# Patient Record
Sex: FEMALE | Race: WHITE | Employment: PART TIME | URBAN - METROPOLITAN AREA
[De-identification: names, ages, dates, MRNs, and addresses within clinical notes are randomized per-mention and may not be internally consistent; named-entity substitution may affect disease eponyms.]

---

## 2017-03-23 ENCOUNTER — HOSPITAL ENCOUNTER (OUTPATIENT)
Dept: LAB | Age: 72
Discharge: HOME | End: 2017-03-23
Payer: MEDICARE

## 2017-03-23 ENCOUNTER — OFFICE VISIT (OUTPATIENT)
Dept: INTERNAL MEDICINE CLINIC | Age: 72
End: 2017-03-23

## 2017-03-23 ENCOUNTER — TELEPHONE (OUTPATIENT)
Dept: INTERNAL MEDICINE CLINIC | Age: 72
End: 2017-03-23

## 2017-03-23 VITALS
HEART RATE: 77 BPM | HEIGHT: 62 IN | TEMPERATURE: 97.7 F | DIASTOLIC BLOOD PRESSURE: 68 MMHG | RESPIRATION RATE: 20 BRPM | OXYGEN SATURATION: 97 % | WEIGHT: 154 LBS | SYSTOLIC BLOOD PRESSURE: 125 MMHG | BODY MASS INDEX: 28.34 KG/M2

## 2017-03-23 DIAGNOSIS — Z23 IMMUNIZATION DUE: ICD-10-CM

## 2017-03-23 DIAGNOSIS — E55.9 VITAMIN D DEFICIENCY: ICD-10-CM

## 2017-03-23 DIAGNOSIS — J45.20 RAD (REACTIVE AIRWAY DISEASE), MILD INTERMITTENT, UNCOMPLICATED: ICD-10-CM

## 2017-03-23 DIAGNOSIS — E07.9 THYROID DISORDER: ICD-10-CM

## 2017-03-23 DIAGNOSIS — Z11.59 NEED FOR HEPATITIS C SCREENING TEST: ICD-10-CM

## 2017-03-23 DIAGNOSIS — M25.551 PAIN OF RIGHT HIP JOINT: ICD-10-CM

## 2017-03-23 DIAGNOSIS — R23.8 SKIN PAPULES, GENERALIZED: ICD-10-CM

## 2017-03-23 DIAGNOSIS — I10 ESSENTIAL HYPERTENSION: Primary | ICD-10-CM

## 2017-03-23 PROCEDURE — 36415 COLL VENOUS BLD VENIPUNCTURE: CPT

## 2017-03-23 PROCEDURE — 82306 VITAMIN D 25 HYDROXY: CPT

## 2017-03-23 PROCEDURE — 80053 COMPREHEN METABOLIC PANEL: CPT

## 2017-03-23 PROCEDURE — 82043 UR ALBUMIN QUANTITATIVE: CPT

## 2017-03-23 PROCEDURE — 86803 HEPATITIS C AB TEST: CPT

## 2017-03-23 PROCEDURE — 84443 ASSAY THYROID STIM HORMONE: CPT

## 2017-03-23 RX ORDER — ALBUTEROL SULFATE 90 UG/1
2 AEROSOL, METERED RESPIRATORY (INHALATION)
Qty: 1 INHALER | Refills: 3 | Status: SHIPPED | OUTPATIENT
Start: 2017-03-23 | End: 2018-10-25 | Stop reason: SDUPTHER

## 2017-03-23 RX ORDER — LOSARTAN POTASSIUM 50 MG/1
50 TABLET ORAL DAILY
Qty: 90 TAB | Refills: 3 | Status: SHIPPED | COMMUNITY
Start: 2017-03-23 | End: 2018-03-04 | Stop reason: SDUPTHER

## 2017-03-23 RX ORDER — POLYETHYLENE GLYCOL 3350 17 G/17G
POWDER, FOR SOLUTION ORAL
Refills: 6 | COMMUNITY
Start: 2017-03-04 | End: 2021-06-10

## 2017-03-23 RX ORDER — LEVOTHYROXINE SODIUM 112 UG/1
112 TABLET ORAL
Qty: 90 TAB | Refills: 3 | Status: SHIPPED | COMMUNITY
Start: 2017-03-23 | End: 2017-03-27 | Stop reason: SDUPTHER

## 2017-03-23 NOTE — TELEPHONE ENCOUNTER
Pt left some information for the doctor to review. I placed it in the doctor's mailbox at the .      Thanks

## 2017-03-23 NOTE — PROGRESS NOTES
Milli San is a 73 y.o. female and presents with Osteoarthritis and Hip Pain  . Pt presents for follow up re: right hip, leg and ankle pain    She has transferred from [place] to live close to her 2 sons in [place]. She is not  and still works for [employer] in [place]. Right hip pain  Pt reports ongoing history >2 years of right hip pain but also involving right knee and ankle. She denies trauma or fall. She does have to carry heavy luggages in the airport when traveling. She was told she may be bursititis in her right hip but did not get injection. Mild relief with advil/nsaid product. No falls  No giving way of knees    Subjective:  Cardiovascular Review:  The patient has hypertension. Diet and Lifestyle: generally follows a low fat low cholesterol diet  Home BP Monitoring: is not measured at home. Pertinent ROS: taking medications as instructed, no medication side effects noted, no TIA's, no chest pain on exertion, no dyspnea on exertion, no swelling of ankles. Asthma Review:  The patient is being seen for follow up of no history of pneumonia or bronchitis and asthma, not currently in exacerbation. Asthma symptoms occur: infrequently. Wheezing when present is described as mild and easily relieved with rescue bronchodilator. Current limitations in activity from asthma: none. Number of days of school or work missed in the last month: 0. Frequency of use of quick-relief meds: rare. Regimen compliance: The patient reports adherence to this regimen. Patient denies and does not smoke cigarettes. Thyroid Review:  Patient is seen for followup of hypothyroidism. Thyroid ROS: denies fatigue, weight changes, heat/cold intolerance, bowel/skin changes or CVS symptoms. Subjective:   Milli San is a 73 y.o. female with hypertension.   Hypertension ROS: taking medications as instructed, no medication side effects noted, no TIA's, no chest pain on exertion, no dyspnea on exertion, no swelling of ankles. New concerns: sometimes has periods of lightheadedness    Thyroid Disease:  Elena Justice is a 73 y.o. female here for follow up of hypothyroidism. Lab Results   Component Value Date/Time    TSH 3.430 03/31/2016 11:41 AM     Residual symptoms denies fatigue, weight changes, heat/cold intolerance, bowel/skin changes or CVS symptoms. Thyroid medication has been unchanged since last medication check and labs. Review of Systems  Constitutional: negative for fevers, chills, anorexia and weight loss  Eyes:   negative for visual disturbance and irritation  ENT:   negative for tinnitus,sore throat,nasal congestion,ear pains. hoarseness  Respiratory:  negative for cough, hemoptysis, dyspnea,wheezing  CV:   negative for chest pain, palpitations, lower extremity edema  GI:   negative for nausea, vomiting, diarrhea, abdominal pain,melena  Endo:               negative for polyuria,polydipsia,polyphagia,heat intolerance  Genitourinary: negative for frequency, dysuria and hematuria  Integument:  negative for rash and pruritus  Hematologic:  negative for easy bruising and gum/nose bleeding  Musculoskel: negative for myalgias, arthralgias, back pain, muscle weakness, joint pain  Neurological:  negative for headaches, dizziness, vertigo, memory problems and gait   Behavl/Psych: negative for feelings of anxiety, depression, mood changes    Past Medical History:   Diagnosis Date    Asthma     Cataract     Hypertension     Joint pain     feet and hips     Precancerous lesion 2003    Colon    Thyroid disease      Past Surgical History:   Procedure Laterality Date    COLONOSCOPY N/A 6/20/2016    COLONOSCOPY performed by Georgia Hollins MD at [address] [facility] HX CATARACT REMOVAL      HX COLECTOMY      HX COLONOSCOPY      partial colon removed   colonscopy q 5 years     HX DILATION AND CURETTAGE      HX TONSIL AND ADENOIDECTOMY       Social History     Social History    Marital status:      Spouse name: N/A    Number of children: N/A    Years of education: N/A     Social History Main Topics    Smoking status: Former Smoker    Smokeless tobacco: Never Used    Alcohol use 3.0 oz/week     5 Glasses of wine per week    Drug use: No    Sexual activity: No     Other Topics Concern    None     Social History Narrative    Working part time for [employer]    manageble stress        Not     Children: 2 sons healthy    2 grandchildren healthy so far     Family History   Problem Relation Age of Onset    Cancer Mother      cervical cancer    Stroke Mother      hemorhagic    Diabetes Father     Hypertension Father      Current Outpatient Prescriptions   Medication Sig Dispense Refill    Cholecalciferol, Vitamin D3, 50,000 unit cap Take 1 Cap by mouth every seven (7) days. Indications: VITAMIN D DEFICIENCY 12 Cap 0    aspirin delayed-release 81 mg tablet Take  by mouth daily.  calcium-cholecalciferol, d3, 600-125 mg-unit tab Take  by mouth.  losartan (COZAAR) 50 mg tablet Take 1 Tab by mouth daily. 90 Tab 3    levothyroxine (SYNTHROID) 112 mcg tablet Take 1 Tab by mouth Daily (before breakfast). Indications: HYPOTHYROIDISM 90 Tab 3    polyethylene glycol (MIRALAX) 17 gram/dose powder TK 17 GRAMS DISSOLVED IN WATER ONCE A DAY  6    albuterol (PROVENTIL HFA, VENTOLIN HFA, PROAIR HFA) 90 mcg/actuation inhaler Take 2 Puffs by inhalation every four (4) hours as needed for Wheezing or Shortness of Breath. 1 Inhaler 3    fluticasone-salmeterol (ADVAIR) 100-50 mcg/dose diskus inhaler Take 1 Puff by inhalation two (2) times a day.  Indications: MAINTENANCE THERAPY FOR ASTHMA 1 Inhaler 1     No Known Allergies    Objective:  Visit Vitals    /68 (BP 1 Location: Left arm, BP Patient Position: Sitting)    Pulse 77    Temp 97.7 °F (36.5 °C) (Oral)    Resp 20    Ht 5' 2\" (1.575 m)    Wt 154 lb (69.9 kg)    SpO2 97%    BMI 28.17 kg/m2 Physical Exam:   General appearance - alert, well appearing, and in no distress  Mental status - alert, oriented to person, place, and time  EYE-CALVIN, EOMI, corneas normal, no foreign bodies, visual acuity normal both eyes, no periorbital cellulitis  ENT-ENT exam normal, no neck nodes or sinus tenderness  Nose - normal and patent, no erythema, discharge or polyps  Mouth - mucous membranes moist, pharynx normal without lesions  Neck - supple, no significant adenopathy   Chest - clear to auscultation, no wheezes, rales or rhonchi, symmetric air entry   Heart - normal rate, regular rhythm, normal S1, S2, no murmurs, rubs, clicks or gallops   Abdomen - soft, nontender, nondistended, no masses or organomegaly  Lymph- no adenopathy palpable  Ext-peripheral pulses normal, no pedal edema, no clubbing or cyanosis  Skin-Warm and dry. no hyperpigmentation, vitiligo, or suspicious lesions  Neuro -alert, oriented, normal speech, no focal findings or movement disorder noted  Neck-normal C-spine, no tenderness, full ROM without pain      Results for orders placed or performed in visit on 24/00/56   METABOLIC PANEL, COMPREHENSIVE   Result Value Ref Range    Glucose 99 65 - 99 mg/dL    BUN 11 8 - 27 mg/dL    Creatinine 0.63 0.57 - 1.00 mg/dL    GFR est non-AA 92 >59 mL/min/1.73    GFR est  >59 mL/min/1.73    BUN/Creatinine ratio 17 11 - 26    Sodium 139 134 - 144 mmol/L    Potassium 4.1 3.5 - 5.2 mmol/L    Chloride 102 97 - 108 mmol/L    CO2 26 18 - 29 mmol/L    Calcium 9.4 8.7 - 10.3 mg/dL    Protein, total 6.3 6.0 - 8.5 g/dL    Albumin 4.5 3.6 - 4.8 g/dL    GLOBULIN, TOTAL 1.8 1.5 - 4.5 g/dL    A-G Ratio 2.5 1.1 - 2.5    Bilirubin, total 1.0 0.0 - 1.2 mg/dL    Alk.  phosphatase 83 39 - 117 IU/L    AST (SGOT) 23 0 - 40 IU/L    ALT (SGPT) 19 0 - 32 IU/L   LIPID PANEL   Result Value Ref Range    Cholesterol, total 189 100 - 199 mg/dL    Triglyceride 125 0 - 149 mg/dL    HDL Cholesterol 50 >39 mg/dL    VLDL, calculated 25 5 - 40 mg/dL    LDL, calculated 114 (H) 0 - 99 mg/dL   TSH, 3RD GENERATION   Result Value Ref Range    TSH 3.430 0.450 - 4.500 uIU/mL   VITAMIN D, 25 HYDROXY   Result Value Ref Range    VITAMIN D, 25-HYDROXY 20.6 (L) 30.0 - 100.0 ng/mL   MICROALBUMIN:CREATININE RATIO, RANDOM URINE   Result Value Ref Range    Creatinine, urine 94.8 15.0 - 278.0 mg/dL    Microalbumin, urine 4.9 0.0 - 17.0 ug/mL    Microalb/Creat ratio (ug/mg creat.) 5.2 0.0 - 30.0 mg/g creat   CVD REPORT   Result Value Ref Range    INTERPRETATION Note      Prevention    Cardiovascular profile  Family hx  Exercising:  Enjoys [employer]   Blood pressure:  Health healthy diet:  Diabetes:  Cholesterol:  Renal function:      Cancer risk profile  Mammogram due in June 2015  Lung none  Colonoscopy dr. lAfie Mcbride  Skin nonhealing in 2 weeks seen dermatology  Gyn abnormal bleeding/discharge/abd pain/pressure      Thyroid sx no sx    Osteopenia prevention  Calcium 1000mg/day yes  Vitamin D 800iu/day yes  Bone density 2015    Mental health scale: 10/10  Depression  Anxiety  Sleep # of hours:  Energy Level:        Immunizations needs tdap  TDAP  Pneumonia vaccine  Flu vaccine  Shingles vaccine  HPV      Edith Spears was seen today for osteoarthritis and hip pain. Diagnoses and all orders for this visit:    Essential hypertension  Cont meds  Recheck of bp wnl  -     METABOLIC PANEL, COMPREHENSIVE  -     MICROALBUMIN, UR, RAND W/ MICROALBUMIN/CREA RATIO  -     REFERRAL TO OPHTHALMOLOGY  -     losartan (COZAAR) 50 mg tablet; Take 1 Tab by mouth daily. Thyroid disorder  Needs labs tor refill  Appears euthryoid  -     TSH 3RD GENERATION  -     levothyroxine (SYNTHROID) 112 mcg tablet; Take 1 Tab by mouth Daily (before breakfast). Indications: hypothyroidism    Pain of right hip joint  -     REFERRAL TO PHYSICAL THERAPY    Immunization due  -     pneumococcal 23-valent (PNEUMOVAX 23) 25 mcg/0.5 mL injection; 0.5 mL by IntraMUSCular route once for 1 dose.  Indications: PREVENTION OF STREPTOCOCCUS PNEUMONIAE INFECTION    Need for hepatitis C screening test  -     HEPATITIS C AB    Vitamin D deficiency  -     VITAMIN D, 25 HYDROXY    RAD (reactive airway disease), mild intermittent, uncomplicated  -     albuterol (PROVENTIL HFA, VENTOLIN HFA, PROAIR HFA) 90 mcg/actuation inhaler; Take 2 Puffs by inhalation every four (4) hours as needed for Wheezing or Shortness of Breath. Skin papules, generalized  -     REFERRAL TO DERMATOLOGY  Needs skin cancer screening      This note will not be viewable in MyChart.

## 2017-03-23 NOTE — MR AVS SNAPSHOT
Visit Information Date & Time Provider Department Dept. Phone Encounter #  
 3/23/2017  9:15 AM Berna Obrien MD [facility] [address] [id number] Upcoming Health Maintenance Date Due FOBT Q 1 YEAR AGE 50-75 10/8/1997 Pneumococcal 65+ Low/Medium Risk (2 of 2 - PPSV23) 10/31/2016 MEDICARE YEARLY EXAM 7/20/2017 BREAST CANCER SCRN MAMMOGRAM 6/16/2018 GLAUCOMA SCREENING Q2Y 3/23/2019 DTaP/Tdap/Td series (2 - Td) 3/31/2026 Allergies as of 3/23/2017  Review Complete On: 3/23/2017 By: Berna Obrien MD  
 No Known Allergies Current Immunizations  Reviewed on 3/23/2017 Name Date Influenza High Dose Vaccine PF 9/30/2016 Influenza Vaccine 10/31/2015 Pneumococcal Conjugate (PCV-13) 10/31/2015 Pneumococcal Polysaccharide (PPSV-23) 6/15/2010 Tdap 3/31/2016 11:31 AM  
 Zoster Vaccine, Live 6/15/2010 Reviewed by Jennifer Alejo LPN on 5/75/7961 at  9:18 AM  
 Reviewed by Jennifer Alejo LPN on 4/28/4146 at  9:18 AM  
 Reviewed by Jennifer Alejo LPN on 5/22/4500 at  9:18 AM  
You Were Diagnosed With   
  
 Codes Comments Essential hypertension    -  Primary ICD-10-CM: I10 
ICD-9-CM: 401.9 Thyroid disorder     ICD-10-CM: E07.9 ICD-9-CM: 246.9 Pain of right hip joint     ICD-10-CM: M25.551 ICD-9-CM: 719.45 Immunization due     ICD-10-CM: Z23 ICD-9-CM: V05.9 Need for hepatitis C screening test     ICD-10-CM: Z11.59 
ICD-9-CM: V73.89 Vitamin D deficiency     ICD-10-CM: E55.9 ICD-9-CM: 268.9   
 RAD (reactive airway disease), mild intermittent, uncomplicated     WUT-88-ML: J45.20 ICD-9-CM: 493.90 Skin papules, generalized     ICD-10-CM: R23.8 ICD-9-CM: 709.8 Vitals BP Pulse Temp Resp Height(growth percentile) Weight(growth percentile) 125/68 (BP 1 Location: Left arm, BP Patient Position: Sitting) 77 97.7 °F (36.5 °C) (Oral) 20 5' 2\" (1.575 m) 154 lb (69.9 kg) SpO2 BMI OB Status Smoking Status 97% 28.17 kg/m2 Postmenopausal Former Smoker Vitals History BMI and BSA Data Body Mass Index Body Surface Area  
 28.17 kg/m 2 1.75 m 2 Preferred Pharmacy Pharmacy Name Phone [address] [phone] Your Updated Medication List  
  
   
This list is accurate as of: 3/23/17 10:00 AM.  Always use your most recent med list.  
  
  
  
  
 albuterol 90 mcg/actuation inhaler Commonly known as:  PROVENTIL HFA, VENTOLIN HFA, PROAIR HFA Take 2 Puffs by inhalation every four (4) hours as needed for Wheezing or Shortness of Breath. aspirin delayed-release 81 mg tablet Take  by mouth daily. calcium-cholecalciferol (d3) 600-125 mg-unit Tab Take  by mouth. Cholecalciferol (Vitamin D3) 50,000 unit Cap Take 1 Cap by mouth every seven (7) days. Indications: VITAMIN D DEFICIENCY  
  
 fluticasone-salmeterol 100-50 mcg/dose diskus inhaler Commonly known as:  ADVAIR Take 1 Puff by inhalation two (2) times a day. Indications: MAINTENANCE THERAPY FOR ASTHMA  
  
 levothyroxine 112 mcg tablet Commonly known as:  SYNTHROID Take 1 Tab by mouth Daily (before breakfast). Indications: hypothyroidism  
  
 losartan 50 mg tablet Commonly known as:  COZAAR Take 1 Tab by mouth daily. pneumococcal 23-valent 25 mcg/0.5 mL injection Commonly known as:  PNEUMOVAX 23  
0.5 mL by IntraMUSCular route once for 1 dose. Indications: PREVENTION OF STREPTOCOCCUS PNEUMONIAE INFECTION  
  
 polyethylene glycol 17 gram/dose powder Commonly known as:  Chelsea Coe TK 17 GRAMS DISSOLVED IN WATER ONCE A DAY Prescriptions Printed Refills  
 pneumococcal 23-valent (PNEUMOVAX 23) 25 mcg/0.5 mL injection 0 Si.5 mL by IntraMUSCular route once for 1 dose. Indications: PREVENTION OF STREPTOCOCCUS PNEUMONIAE INFECTION Class: Print Route: IntraMUSCular albuterol (PROVENTIL HFA, VENTOLIN HFA, PROAIR HFA) 90 mcg/actuation inhaler 3 Sig: Take 2 Puffs by inhalation every four (4) hours as needed for Wheezing or Shortness of Breath. Class: Print Route: Inhalation Prescriptions Sent to Mail Order Refills  
 losartan (COZAAR) 50 mg tablet 3 Sig: Take 1 Tab by mouth daily. Class: Mail Order Pharmacy: [address], [address] [place] [address] Ph #: [phone] Route: Oral  
 levothyroxine (SYNTHROID) 112 mcg tablet 3 Sig: Take 1 Tab by mouth Daily (before breakfast). Indications: hypothyroidism Class: Mail Order Pharmacy: [address], [address] [place] [address] Ph #: [phone] Route: Oral  
  
We Performed the Following HEPATITIS C AB [71750 CPT(R)] METABOLIC PANEL, COMPREHENSIVE [96313 CPT(R)] MICROALBUMIN, UR, RAND W/ MICROALBUMIN/CREA RATIO [id number] CPT(R)] REFERRAL TO DERMATOLOGY [REF19 Custom] Comments:  
 Skin cancer screen pt would like to see MD  
 REFERRAL TO OPHTHALMOLOGY [REF57 Custom] Comments:  
 Please evaluate patient for htn hx and please evaluate macula. REFERRAL TO PHYSICAL THERAPY [WEH99 Custom] Comments:  
 Right hip, knee and foot pain, please evaluate kinetic line and gait TSH 3RD GENERATION [68263 CPT(R)] VITAMIN D, 25 HYDROXY [id number] CPT(R)] Referral Information Referral ID Referred By Referred To  
  
 [id number] Tigist Durant MD   
   [address] [facility] [address], [address] Phone: [phone] Fax: [phone] Visits Status Start Date End Date 1 New Request 3/23/17 3/23/18 If your referral has a status of pending review or denied, additional information will be sent to support the outcome of this decision. Referral ID Referred By Referred To [id number] [facility], [address] [facility], [address] [facility] Visits Status Start Date End Date 1 New Request 3/23/17 3/23/18 If your referral has a status of pending review or denied, additional information will be sent to support the outcome of this decision. Referral ID Referred By Referred To  
 [id number] Ele Oneal MD  
   [address] [facility], [address] [place] [address] Phone: [phone] Fax: [phone] Visits Status Start Date End Date 1 New Request 3/23/17 3/23/18 If your referral has a status of pending review or denied, additional information will be sent to support the outcome of this decision. Introducing [facility]! Dear Cesario Nuñez: Thank you for requesting a [employer] account. Our records indicate that you already have an active [employer] account. You can access your account anytime at [email or web address]. [email or web address] Did you know that you can access your hospital and ER discharge instructions at any time in [employer]? You can also review all of your test results from your hospital stay or ER visit. Additional Information If you have questions, please visit the Frequently Asked Questions section of the [employer] website at [email or web address] Remember, [employer] is NOT to be used for urgent needs. For medical emergencies, dial 911. Now available from your iPhone and Android! Please provide this summary of care documentation to your next provider. Your primary care clinician is listed as Triny Pace. If you have any questions after today's visit, please call [phone].

## 2017-03-24 LAB
25(OH)D3+25(OH)D2 SERPL-MCNC: 34.1 NG/ML (ref 30–100)
ALBUMIN SERPL-MCNC: 4.7 G/DL (ref 3.6–4.8)
ALBUMIN/CREAT UR: 5 MG/G CREAT (ref 0–30)
ALBUMIN/GLOB SERPL: 2.6 {RATIO} (ref 1.2–2.2)
ALP SERPL-CCNC: 73 IU/L (ref 39–117)
ALT SERPL-CCNC: 16 IU/L (ref 0–32)
AST SERPL-CCNC: 19 IU/L (ref 0–40)
BILIRUB SERPL-MCNC: 0.7 MG/DL (ref 0–1.2)
BUN SERPL-MCNC: 10 MG/DL (ref 8–27)
BUN/CREAT SERPL: 19 (ref 11–26)
CALCIUM SERPL-MCNC: 9.6 MG/DL (ref 8.7–10.3)
CHLORIDE SERPL-SCNC: 99 MMOL/L (ref 96–106)
CO2 SERPL-SCNC: 26 MMOL/L (ref 18–29)
CREAT SERPL-MCNC: 0.52 MG/DL (ref 0.57–1)
CREAT UR-MCNC: 66.6 MG/DL
GLOBULIN SER CALC-MCNC: 1.8 G/DL (ref 1.5–4.5)
GLUCOSE SERPL-MCNC: 96 MG/DL (ref 65–99)
HCV AB S/CO SERPL IA: <0.1 S/CO RATIO (ref 0–0.9)
MICROALBUMIN UR-MCNC: 3.3 UG/ML
POTASSIUM SERPL-SCNC: 4.8 MMOL/L (ref 3.5–5.2)
PROT SERPL-MCNC: 6.5 G/DL (ref 6–8.5)
SODIUM SERPL-SCNC: 141 MMOL/L (ref 134–144)
TSH SERPL DL<=0.005 MIU/L-ACNC: 5.45 UIU/ML (ref 0.45–4.5)

## 2017-03-27 DIAGNOSIS — E07.9 THYROID DISORDER: ICD-10-CM

## 2017-03-27 RX ORDER — LEVOTHYROXINE SODIUM 125 UG/1
125 TABLET ORAL
Qty: 30 TAB | Refills: 3 | Status: SHIPPED | OUTPATIENT
Start: 2017-03-27 | End: 2017-08-01 | Stop reason: SDUPTHER

## 2017-03-27 NOTE — PROGRESS NOTES
Please call pt and let her know I increased her thryoid medication. She can  the dose 125 mcg at her local [facility]. She needs to recheck in 3 months while she is on her medication.  Thanks,lauren

## 2017-05-24 ENCOUNTER — HOSPITAL ENCOUNTER (OUTPATIENT)
Dept: PHYSICAL THERAPY | Age: 72
Discharge: HOME | End: 2017-05-24
Payer: MEDICARE

## 2017-05-24 PROCEDURE — G8978 MOBILITY CURRENT STATUS: HCPCS | Performed by: PHYSICAL THERAPIST

## 2017-05-24 PROCEDURE — G8979 MOBILITY GOAL STATUS: HCPCS | Performed by: PHYSICAL THERAPIST

## 2017-05-24 PROCEDURE — 97112 NEUROMUSCULAR REEDUCATION: CPT | Performed by: PHYSICAL THERAPIST

## 2017-05-24 PROCEDURE — 97162 PT EVAL MOD COMPLEX 30 MIN: CPT | Performed by: PHYSICAL THERAPIST

## 2017-05-24 NOTE — PROGRESS NOTES
PT INITIAL EVALUATION NOTE - [facility] 2-15    Patient Name: Zita Garnett  Date:2017  : 1945  [x]  Patient  Verified  Payor: Aidee Lu / Plan: [place] MEDICARE PART A & B / Product Type: Medicare /    In time:10:35 AM  Out time:11:30 AM  Total Treatment Time (min): 55  Total Timed Codes (min): 20  1:1 Treatment Time ( [address] only): 55   Visit #: 1     Treatment Area: Right hip pain [M25.551]    SUBJECTIVE  Pain Level (0-10 scale): 0/10  At worst: 4/10, pain is increased with walking (hip), standing in one place (feet), carrying heavy objects  At best: 0/10, pain is decreased with Aleve, hot water, rest  Any medication changes, allergies to medications, adverse drug reactions, diagnosis change, or new procedure performed?: [] No    [x] Yes (see summary sheet for update)  Subjective:    Pt c/o pain in her R hip and feet. Foot pain has been present years. Hip pain began 4 years ago. Pain is intermittent. Patient reports she has pain at the top of her feet and has bunions. Pt has not had x-rays or MRIs. Pt reports she takes Aleve if she has trouble falling asleep at night. 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, especially up and down the steps  Living Situation: Pt has stairs in her home, \"but I don't need to go up and down stairs often\" \"I usually hold on to the railing\"  Pt Goals:  To be pain free  Barriers: Chronic nature of condition  Motivation: Good  Substance use: None  FABQ Score: 60%  Cognition: A & O x 3 OBJECTIVE/EXAMINATION  Posture:  L shoulder higher, pes planus, R bunion > L bunion  Palpation: TTP at B piriformis/ glut med    Lower Extremity AROM:        R  L  Hip IR   35  45   Hip ER   35  55    Lumbar AROM WFL and pain free    LOWER QUARTER   MUSCLE STRENGTH  KEY       R  L  0 - No Contraction  L1, L2 Psoas  4  5    1 - Trace   L3 Quads  5  5    2 - Poor   L4 Tib Ant  5  5    3 - Fair    L5 EHL  5  4    4 - Good   S1 FHL  5  5    5 - Normal   S2 Hams  5  5            MMT: See above  Hip abduction R 4/5 L 4+/5  Hip IR 4/5 B  Hip ER 4+/5 B    Neurological: Sensation WNL  Special Tests:         Nilam: R L +   H.S. SLR: Negative             NAEEM: R+ L -   Single Leg Stance: R 5 s L 10 s, increased postural sway     20 min Neuromuscular Re-education:  [x]  See flow sheet :   Rationale: increase ROM, increase strength and improve coordination  to improve the patients ability to sit, stand, transfer, ambulate, lift, carry, reach, complete ADLs      With   [] TE   [] TA   [x] neuro   [] other: Patient Education: [x] Review HEP    [] Progressed/Changed HEP based on:   [x] positioning   [x] body mechanics   [] transfers   [x] heat/ice application    [] other:      Other Objective/Functional Measures:- HAdDT following intervention    Pain Level (0-10 scale) post treatment: 0    ASSESSMENT/Changes in Function:     [x]  See Plan of [address] Sotelo, PT 5/24/2017  10:34 AM

## 2017-05-24 NOTE — PROGRESS NOTES
[address] [place] [address]  Phone: [phone]  Fax: [phone]    Plan of Care/Statement of Necessity for Physical Therapy Services  2-15    Patient name: Tee Cadena  : 1945  Provider#: [id number]  Referral source: [facility] *      Medical/Treatment Diagnosis: Right hip pain [M25.551]     Prior Hospitalization: see medical history     Comorbidities: arthritis, HTN, asthma  Prior Level of Function: Pt enjoys walking 3-3.5 miles and works 20 hours per week  Medications: Verified on Patient Summary List  Start of Care: 17      Onset Date: 4 years ago   The Plan of Care and following information is based on the information from the initial evaluation. Assessment/ key information: The patient presents with L AIC, R BC patterning per [facility] treatment approach contributing to R hip pain. The patient's condition is complicated by chronic nature of condition and presence of B foot pain possibly due to foot shape (pes planus/ bunions B). Evaluation Complexity History MEDIUM  Complexity : 1-2 comorbidities / personal factors will impact the outcome/ POC ; Examination HIGH Complexity : 4+ Standardized tests and measures addressing body structure, function, activity limitation and / or participation in recreation  ;Presentation MEDIUM Complexity : Evolving with changing characteristics  ; Clinical Decision Making MEDIUM Complexity : FOTO score of 26-74  Overall Complexity Rating: MEDIUM    Problem List: pain affecting function, decrease ROM, decrease strength, impaired gait/ balance, decrease ADL/ functional abilitiies, decrease activity tolerance, decrease flexibility/ joint mobility and decrease transfer abilities   Treatment Plan may include any combination of the following: Therapeutic exercise, Neuromuscular re-education, Physical agent/modality, Gait/balance training, Manual therapy, Patient education and Functional mobility training  Patient / Family readiness to learn indicated by: asking questions, trying to perform skills and interest  Persons(s) to be included in education: patient (P)  Barriers to Learning/Limitations: None  Patient Goal (s): decrease pain, keep up with my walking routine  Patient Self Reported Health Status: good  Rehabilitation Potential: good    Short Term Goals: To be accomplished in 4 weeks:  1) Patient will demonstrate independence with HEP  2) Patient will demonstrate Negative Hruska Adduction Drop Test   3) Patient will demonstrate greater than Grade II on Hruska Adduction Lift Test  Long Term Goals: To be accomplished in 12 weeks:  1) The patient will resume walking routine without an increase in pain  2) The patient will report ability to carry computer bag up the stairs without an increase in pain  3) The patient will be independent with finalized HEP  Frequency / Duration: Patient to be seen 2 times per week for 12 weeks. Patient/ Caregiver education and instruction: self care, activity modification and exercises    [x]  Plan of care has been reviewed with PTA    G-Codes (GP)  Mobility   Current  CK= 40-59%   Goal  CJ= 20-39%    The severity rating is based on clinical judgment and the FOTO Score score. Certification Period: 5/24/17-8/24/17  Yoko Carrillo, PT 5/24/2017 12:49 PM    ________________________________________________________________________    I certify that the above Therapy Services are being furnished while the patient is under my care. I agree with the treatment plan and certify that this therapy is necessary.     [de-identified] Signature:____________________  Date:____________Time: _________

## 2017-06-01 ENCOUNTER — HOSPITAL ENCOUNTER (OUTPATIENT)
Dept: PHYSICAL THERAPY | Age: 72
Discharge: HOME | End: 2017-06-01
Payer: MEDICARE

## 2017-06-01 PROCEDURE — 97112 NEUROMUSCULAR REEDUCATION: CPT | Performed by: PHYSICAL THERAPIST

## 2017-06-01 NOTE — PROGRESS NOTES
PT DAILY TREATMENT NOTE - [facility] 2-15    Patient Name: Valdez Alfaro  Date:2017  : 1945  [x]  Patient  Verified  [place]or: [place] MED[place]ARE / Plan: [place] MEDICARE PART A & B / Product Type: Medicare /    In time:8:35 AM  Out time:9:05 AM  Total Treatment Time (min): 30  Total Timed Codes (min): 30  1:1 Treatment Time ([facility] only): 30   Visit #: 2     Treatment Area: Right hip pain [M25.551]    SUBJECTIVE  Pain Level (0-10 scale): 0  Any medication changes, allergies to medications, adverse drug reactions, diagnosis change, or new procedure performed?: [x] No    [] Yes (see summary sheet for update)  Subjective functional status/changes:   [] No changes reported  Pt reports she only feels the pain when she hauls her groceries up a flight of stairs    OBJECTIVE    30 min Neuromuscular Re-education:  [x]  See flow sheet :   Rationale: improve coordination, improve balance and increase proprioception  to improve the patients ability to sit, stand, transfer, ambulate, lift, carry, reach, complete ADLs        With   [] TE   [] TA   [x] neuro   [] other: Patient Education: [x] Review HEP    [] Progressed/Changed HEP based on:   [x] positioning   [x] body mechanics   [] transfers   [x] heat/ice application    [] other:      Other Objective/Functional Measures:   HAdDT: R- L +, Negative following reposition    Pain Level (0-10 scale) post treatment: 0    ASSESSMENT/Changes in Function:     Patient will continue to benefit from skilled PT services to modify and progress therapeutic interventions, address functional mobility deficits, address ROM deficits, address strength deficits, analyze and address soft tissue restrictions, analyze and cue movement patterns, analyze and modify body mechanics/ergonomics and assess and modify postural abnormalities to attain remaining goals.      []  See Plan of Care  []  See progress note/recertification  []  See Discharge Summary         Progress towards goals / Updated goals:  Patient continues to require verbal cues to complete exercises with correct form and postural awareness. Patient was able to advance several exercises this visit and is progressing well towards goals.     PLAN  [x]  Upgrade activities as tolerated     [x]  Continue plan of care  [x]  Update interventions per flow sheet       []  Discharge due to:_  []  Other:_      Fransisca Sibley, PT 6/1/2017  8:35 AM

## 2017-06-08 ENCOUNTER — HOSPITAL ENCOUNTER (OUTPATIENT)
Dept: PHYSICAL THERAPY | Age: 72
Discharge: HOME | End: 2017-06-08
Payer: MEDICARE

## 2017-06-08 PROCEDURE — 97112 NEUROMUSCULAR REEDUCATION: CPT | Performed by: PHYSICAL THERAPIST

## 2017-06-08 NOTE — PROGRESS NOTES
PT DAILY TREATMENT NOTE - [facility] 2-15    Patient Name: Eddie Joseph  Date:2017  : 1945  [x]  Patient  Verified  Payor: [place] MEDICARE / Plan: [place] MEDICARE PART A & B / Product Type: Medicare /    In time: 10:00 AM  Out time: 10:30 AM  Total Treatment Time (min): 30  Total Timed Codes (min): 30  1:1 Treatment Time ( only): 25  Visit #: 3    Treatment Area: Right hip pain [M25.551]    SUBJECTIVE  Pain Level (0-10 scale): 0  Any medication changes, allergies to medications, adverse drug reactions, diagnosis change, or new procedure performed?: [x] No    [] Yes (see summary sheet for update)  Subjective functional status/changes:   [] No changes reported  Pt reports she is noticing an improvement in stair negotiation    OBJECTIVE    30 min Neuromuscular Re-education:  [x]  See flow sheet :   Rationale: improve coordination, improve balance and increase proprioception  to improve the patients ability to sit, stand, transfer, ambulate, lift, carry, reach, complete ADLs        With   [] TE   [] TA   [x] neuro   [] other: Patient Education: [x] Review HEP    [] Progressed/Changed HEP based on:   [x] positioning   [x] body mechanics   [] transfers   [x] heat/ice application    [] other:      Other Objective/Functional Measures:   HAdDT: - B    R L1 L L2    Pain Level (0-10 scale) post treatment: 0    ASSESSMENT/Changes in Function:     Patient will continue to benefit from skilled PT services to modify and progress therapeutic interventions, address functional mobility deficits, address ROM deficits, address strength deficits, analyze and address soft tissue restrictions, analyze and cue movement patterns, analyze and modify body mechanics/ergonomics and assess and modify postural abnormalities to attain remaining goals.      []  See Plan of Care  []  See progress note/recertification  []  See Discharge Summary         Progress towards goals / Updated goals:  Patient continues to require verbal cues to complete exercises with correct form and postural awareness. Patient was able to advance several exercises this visit and is progressing well towards goals.     PLAN  [x]  Upgrade activities as tolerated     [x]  Continue plan of care  [x]  Update interventions per flow sheet       []  Discharge due to:_  []  Other:_      Ethan Oliver, PT 6/8/2017  8:35 AM

## 2017-06-15 ENCOUNTER — APPOINTMENT (OUTPATIENT)
Dept: PHYSICAL THERAPY | Age: 72
End: 2017-06-15
Payer: MEDICARE

## 2017-06-22 ENCOUNTER — HOSPITAL ENCOUNTER (OUTPATIENT)
Dept: PHYSICAL THERAPY | Age: 72
Discharge: HOME | End: 2017-06-22
Payer: MEDICARE

## 2017-06-22 PROCEDURE — 97110 THERAPEUTIC EXERCISES: CPT | Performed by: PHYSICAL THERAPIST

## 2017-06-22 PROCEDURE — 97112 NEUROMUSCULAR REEDUCATION: CPT | Performed by: PHYSICAL THERAPIST

## 2017-06-22 NOTE — PROGRESS NOTES
PT DAILY TREATMENT NOTE - [facility] 2-15    Patient Name: Cecilio Rosas  Date:2017  : 1945  [x]  Patient  Verified  Payor: Brad Madsen / Plan: [place] MEDICARE PART A & B / Product Type: Medicare /    In time: 10:00 AM  Out time: 10:50 AM  Total Treatment Time (min): 50  Total Timed Codes (min): 50  1:1 Treatment Time ( [address] only): 50  Visit #: 4    Treatment Area: Right hip pain [M25.551]    SUBJECTIVE  Pain Level (0-10 scale): 0  Any medication changes, allergies to medications, adverse drug reactions, diagnosis change, or new procedure performed?: [x] No    [] Yes (see summary sheet for update)  Subjective functional status/changes:   [] No changes reported  Pt reports her hip is not hurting but her knees have been hurting more today  Pt reports compliance with HEP    OBJECTIVE    40 min Neuromuscular Re-education:  [x]  See flow sheet :   Rationale: improve coordination, improve balance and increase proprioception  to improve the patients ability to sit, stand, transfer, ambulate, lift, carry, reach, complete ADLs    10 min Therapeutic Exercise:  [x]  See flow sheet :   Rationale: increase ROM and increase strength  to improve the patients ability to sit, stand, transfer, ambulate, lift, carry, reach, complete ADLs           With   [x] TE   [] TA   [x] neuro   [] other: Patient Education: [x] Review HEP    [] Progressed/Changed HEP based on:   [x] positioning   [x] body mechanics   [] transfers   [x] heat/ice application    [] other:      Other Objective/Functional Measures:   HAdDT: - B    R L2 L L2    Pain Level (0-10 scale) post treatment: 0    ASSESSMENT/Changes in Function:     Patient will continue to benefit from skilled PT services to modify and progress therapeutic interventions, address functional mobility deficits, address ROM deficits, address strength deficits, analyze and address soft tissue restrictions, analyze and cue movement patterns, analyze and modify body mechanics/ergonomics and assess and modify postural abnormalities to attain remaining goals. []  See Plan of Care  []  See progress note/recertification  []  See Discharge Summary         Progress towards goals / Updated goals:  Patient continues to require verbal cues to complete exercises with correct form and postural awareness. Patient was able to advance several exercises this visit and is progressing well towards goals.     PLAN  [x]  Upgrade activities as tolerated     [x]  Continue plan of care  [x]  Update interventions per flow sheet       []  Discharge due to:_  []  Other:_      Merary Mcbride, PT 6/22/2017  8:35 AM

## 2017-07-06 ENCOUNTER — HOSPITAL ENCOUNTER (OUTPATIENT)
Dept: PHYSICAL THERAPY | Age: 72
Discharge: HOME | End: 2017-07-06
Payer: MEDICARE

## 2017-07-06 PROCEDURE — G8979 MOBILITY GOAL STATUS: HCPCS | Performed by: PHYSICAL THERAPIST

## 2017-07-06 PROCEDURE — 97112 NEUROMUSCULAR REEDUCATION: CPT | Performed by: PHYSICAL THERAPIST

## 2017-07-06 PROCEDURE — G8978 MOBILITY CURRENT STATUS: HCPCS | Performed by: PHYSICAL THERAPIST

## 2017-07-06 NOTE — PROGRESS NOTES
PT DAILY TREATMENT NOTE - [facility] 2-15    Patient Name: Viviana Ruff  Date:2017  : 1945  [x]  Patient  Verified  Payor: [place] MEDICARE / Plan: [place] MEDICARE PART A & B / Product Type: Medicare /    In time: 10:15 AM  Out time: 10:55 AM  Total Treatment Time (min): 40  Total Timed Codes (min): 40  1:1 Treatment Time ( only): 40  Visit #: 5    Treatment Area: Right hip pain [M25.551]    SUBJECTIVE  Pain Level (0-10 scale): 0  Any medication changes, allergies to medications, adverse drug reactions, diagnosis change, or new procedure performed?: [x] No    [] Yes (see summary sheet for update)  Subjective functional status/changes:   [] No changes reported  Pt reports her left hip has been sore. Pt is walking up to 3.5 miles per day    OBJECTIVE    45 min Neuromuscular Re-education:  [x]  See flow sheet :   Rationale: improve coordination, improve balance and increase proprioception  to improve the patients ability to sit, stand, transfer, ambulate, lift, carry, reach, complete ADLs        With   [x] TE   [] TA   [x] neuro   [] other: Patient Education: [x] Review HEP    [] Progressed/Changed HEP based on:   [x] positioning   [x] body mechanics   [] transfers   [x] heat/ice application    [] other:      Other Objective/Functional Measures:   HAdDT: - B    R L3 L L3    Pain with L hip IR PROM , this decreased following exercise    Pain Level (0-10 scale) post treatment: 0    ASSESSMENT/Changes in Function:     Patient will continue to benefit from skilled PT services to modify and progress therapeutic interventions, address functional mobility deficits, address ROM deficits, address strength deficits, analyze and address soft tissue restrictions, analyze and cue movement patterns, analyze and modify body mechanics/ergonomics and assess and modify postural abnormalities to attain remaining goals.      []  See Plan of Care  [x]  See progress note/recertification  []  See Discharge Summary         Progress towards goals / Updated goals:  Patient continues to require verbal cues to complete exercises with correct form and postural awareness. Patient was able to advance several exercises this visit and is progressing well towards goals.     PLAN  [x]  Upgrade activities as tolerated     [x]  Continue plan of care  [x]  Update interventions per flow sheet       []  Discharge due to:_  []  Other:_      Aleja Hackett, PT 7/6/2017  10:10 AM

## 2017-07-06 NOTE — PROGRESS NOTES
[facility]  [address] [facility] [place] [address]  Phone: [phone]      Fax:  [phone]    Progress Note    Name: Erica oMrillo   : 1945   MD: Jennie Ba *       Treatment Diagnosis: Right hip pain [M25.551]  Start of Care: 17    Visits from Start of Care: 5  Missed Visits: 0    Summary of Care: Therapeutic Exercise,   Neuromuscular Re-education,Patient Education, Home Exercise Program     Assessment / Recommendations: The patient has completed 5 PT visits and has made significant improvements in R hip ROM and activity tolerance. She has resumed her walking routine without an increase in pain; however, she has been limited throughout this plan of care by L hip and B knee/ foot pain. The patient has met all short term goals and is progressing well towards long term goals. Short Term Goals: To be accomplished in 4 weeks:  1) Patient will demonstrate independence with HEP  Status at last note/certification: not met  Status at progress note: met  2) Patient will demonstrate Negative Hruska Adduction Drop Test   Status at last note/certification: not met  Status at progress note: met  3) Patient will demonstrate greater than Grade II on Hruska Adduction Lift Test  Status at last note/certification: not met  Status at progress note: met  Long Term Goals:  To be accomplished in 12 weeks:  1) The patient will resume walking routine without an increase in pain  Status at last note/certification: not met  Status at progress note: not met  2) The patient will report ability to carry computer bag up the stairs without an increase in pain  Status at last note/certification: not met  Status at progress note: not met  3) The patient will be independent with finalized HEP  Status at last note/certification: not met  Status at progress note: not met      G-Codes (GP)  Mobility  [id number] Current  CK= 40-59%   Goal  CJ= 20-39%    The severity rating is based on the FOTO Score score. Evette Palmer, PT 7/6/2017 11:37 AM    ________________________________________________________________________  NOTE TO PHYSICIAN:  Please complete the following and fax to: [facility]: Fax: [phone]. Katerina Meza Retain this original for your records. If you are unable to process this request in 24 hours, please contact our office.        ____ I have read the above report and request that my patient continue therapy with the following changes/special instructions:  ____ I have read the above report and request that my patient be discharged from therapy    Physician's Signature:_________________ Date:___________Time:__________

## 2017-07-07 ENCOUNTER — HOSPITAL ENCOUNTER (OUTPATIENT)
Dept: MAMMOGRAPHY | Age: 72
Discharge: HOME | End: 2017-07-07
Attending: INTERNAL MEDICINE
Payer: MEDICARE

## 2017-07-07 DIAGNOSIS — Z12.31 VISIT FOR SCREENING MAMMOGRAM: ICD-10-CM

## 2017-07-07 PROCEDURE — 77063 BREAST TOMOSYNTHESIS BI: CPT

## 2017-07-24 ENCOUNTER — APPOINTMENT (OUTPATIENT)
Dept: PHYSICAL THERAPY | Age: 72
End: 2017-07-24
Payer: MEDICARE

## 2017-07-24 ENCOUNTER — HOSPITAL ENCOUNTER (OUTPATIENT)
Dept: PHYSICAL THERAPY | Age: 72
Discharge: HOME | End: 2017-07-24
Payer: MEDICARE

## 2017-07-24 PROCEDURE — G8988 SELF CARE GOAL STATUS: HCPCS | Performed by: PHYSICAL THERAPIST

## 2017-07-24 PROCEDURE — G8989 SELF CARE D/C STATUS: HCPCS | Performed by: PHYSICAL THERAPIST

## 2017-07-24 PROCEDURE — 97112 NEUROMUSCULAR REEDUCATION: CPT | Performed by: PHYSICAL THERAPIST

## 2017-07-24 NOTE — PROGRESS NOTES
PT DAILY TREATMENT NOTE - [facility] 2-15    Patient Name: Fifi Wahl  Date:2017  : 1945  [x]  Patient  Verified  Payor: Page Curtist / Plan: [place] MEDICARE PART A & B / Product Type: Medicare /    In time: 4:35 PM Out time: 5:20 PM  Total Treatment Time (min): 45  Total Timed Codes (min): 45  1:1 Treatment Time ( [address] only): 39  Visit #: 6    Treatment Area: Right hip pain [M25.551]    SUBJECTIVE  Pain Level (0-10 scale): 0  Any medication changes, allergies to medications, adverse drug reactions, diagnosis change, or new procedure performed?: [x] No    [] Yes (see summary sheet for update)  Subjective functional status/changes:   [] No changes reported  Pt reports if she doesn't do her exercises right before she goes for a walk she feels better  Pt reports she has been feeling great and is ready for discharge at this time    OBJECTIVE    45 min Neuromuscular Re-education:  [x]  See flow sheet :   Rationale: improve coordination, improve balance and increase proprioception  to improve the patients ability to sit, stand, transfer, ambulate, lift, carry, reach, complete ADLs        With   [x] TE   [] TA   [x] neuro   [] other: Patient Education: [x] Review HEP    [] Progressed/Changed HEP based on:   [x] positioning   [x] body mechanics   [] transfers   [x] heat/ice application    [] other:      Other Objective/Functional Measures:   HAdDT: - B    R L3 L L3    Pain Level (0-10 scale) post treatment: 0    ASSESSMENT/Changes in Function:     Patient will continue to benefit from skilled PT services to modify and progress therapeutic interventions, address functional mobility deficits, address ROM deficits, address strength deficits, analyze and address soft tissue restrictions, analyze and cue movement patterns, analyze and modify body mechanics/ergonomics and assess and modify postural abnormalities to attain remaining goals.      []  See Plan of Care  []  See progress note/recertification  [x]  See Discharge Summary     PLAN  []  Upgrade activities as tolerated     []  Continue plan of care  []  Update interventions per flow sheet       [x]  Discharge due to:_  []  Other:_      Marc Loja PT 7/24/2017  10:10 AM

## 2017-07-24 NOTE — ANCILLARY DISCHARGE INSTRUCTIONS
[address]  Phone: [phone]  Fax: [phone]    Discharge Summary  2-15    Patient name: Savita Alberto  : 1945  Provider#: [id number]  Referral source: [facility] *      Medical/Treatment Diagnosis: Right hip pain [M25.551]     Prior Hospitalization: see medical history     Comorbidities: See Plan of Care  Prior Level of Function:See Plan of Care  Medications: Verified on Patient Summary List    Start of Care: 17      Onset Date:4 years prior to start of care   Visits from Start of Care: 6     Missed Visits: 0  Reporting Period : 17 to 17      ASSESSMENT/SUMMARY OF CARE:  The patient has completed 6 physical therapy visits and has met all short and long term goals. The patient scored a 82% on the FOTO hip survey, a significant improvement from initial evaluation score of 55%. The patient has maximized therapeutic benefit at this time and is ready for discharge to independent [facility].     Short Term Goals: To be accomplished in 4 weeks:  1) Patient will demonstrate independence with HEP  Status at last note/certification: met  Status at discharge: met  2) Patient will demonstrate Negative Hruska Adduction Drop Test   Status at last note/certification: met  Status at discharge:  met  3) Patient will demonstrate greater than Grade II on Hruska Adduction Lift Test  Status at last note/certification: met  Status at discharge:  met  [address] [facility] be accomplished in 12 weeks:  1) The patient will resume walking routine without an increase in pain  Status at last note/certification: not met  Status at discharge:  met  2) The patient will report ability to carry computer bag up the stairs without an increase in pain  Status at last note/certification: not met  Status at discharge: met  3) The patient will be independent with finalized HEP  Status at last note/certification: not met  Status at discharge: met      RECOMMENDATIONS:  [x]Discontinue therapy: [x]Patient has reached or is progressing toward set goals      []Patient is non-compliant or has abdicated      []Due to lack of appreciable progress towards set goals      []Other    Justice Georges, PT 7/24/2017 4:44 PM

## 2017-08-10 ENCOUNTER — OFFICE VISIT (OUTPATIENT)
Dept: INTERNAL MEDICINE CLINIC | Age: 72
End: 2017-08-10

## 2017-08-10 VITALS
TEMPERATURE: 98 F | RESPIRATION RATE: 16 BRPM | OXYGEN SATURATION: 97 % | HEIGHT: 62 IN | SYSTOLIC BLOOD PRESSURE: 101 MMHG | DIASTOLIC BLOOD PRESSURE: 50 MMHG | HEART RATE: 67 BPM | WEIGHT: 158 LBS | BODY MASS INDEX: 29.08 KG/M2

## 2017-08-10 DIAGNOSIS — Z12.11 COLON CANCER SCREENING: ICD-10-CM

## 2017-08-10 DIAGNOSIS — Z13.31 SCREENING FOR DEPRESSION: ICD-10-CM

## 2017-08-10 DIAGNOSIS — E07.9 THYROID DISORDER: ICD-10-CM

## 2017-08-10 DIAGNOSIS — R31.9 HEMATURIA: ICD-10-CM

## 2017-08-10 DIAGNOSIS — I10 ESSENTIAL HYPERTENSION: ICD-10-CM

## 2017-08-10 DIAGNOSIS — Z00.00 MEDICARE ANNUAL WELLNESS VISIT, INITIAL: Primary | ICD-10-CM

## 2017-08-10 DIAGNOSIS — Z71.89 ADVANCED CARE PLANNING/COUNSELING DISCUSSION: ICD-10-CM

## 2017-08-10 DIAGNOSIS — Z13.39 SCREENING FOR ALCOHOLISM: ICD-10-CM

## 2017-08-10 DIAGNOSIS — Z00.00 ROUTINE GENERAL MEDICAL EXAMINATION AT A HEALTH CARE FACILITY: ICD-10-CM

## 2017-08-10 RX ORDER — LEVOTHYROXINE SODIUM 125 UG/1
TABLET ORAL
Qty: 30 TAB | Refills: 3 | Status: SHIPPED | OUTPATIENT
Start: 2017-08-10 | End: 2017-09-18 | Stop reason: SDUPTHER

## 2017-08-10 NOTE — MR AVS SNAPSHOT
Visit Information Date & Time Provider Department Dept. Phone Encounter #  
 8/10/2017  3:45 PM Karla Day MD [facility] [address] [id number] Upcoming Health Maintenance Date Due INFLUENZA AGE 9 TO ADULT 8/1/2017 Pneumococcal 65+ Low/Medium Risk (2 of 2 - PPSV23) 8/31/2018* MEDICARE YEARLY EXAM 8/11/2018 GLAUCOMA SCREENING Q2Y 4/21/2019 BREAST CANCER SCRN MAMMOGRAM 7/7/2019 DTaP/Tdap/Td series (2 - Td) 3/31/2026 COLONOSCOPY 6/20/2026 *Topic was postponed. The date shown is not the original due date. Allergies as of 8/10/2017  Review Complete On: 8/10/2017 By: Karla Day MD  
 No Known Allergies Current Immunizations  Reviewed on 3/23/2017 Name Date Influenza High Dose Vaccine PF 9/30/2016 Influenza Vaccine 10/31/2015 Pneumococcal Conjugate (PCV-13) 10/31/2015 Pneumococcal Polysaccharide (PPSV-23) 6/15/2010 Tdap 3/31/2016 11:31 AM  
 Zoster Vaccine, Live 6/15/2010 Not reviewed this visit You Were Diagnosed With   
  
 Codes Comments Essential hypertension    -  Primary ICD-10-CM: I10 
ICD-9-CM: 401.9 Thyroid disorder     ICD-10-CM: E07.9 ICD-9-CM: 246. 9 Vitals BP Pulse Temp Resp Height(growth percentile) Weight(growth percentile) 101/50 (BP 1 Location: Left arm, BP Patient Position: Sitting) 67 98 °F (36.7 °C) (Oral) 16 5' 2\" (1.575 m) 158 lb (71.7 kg) SpO2 BMI OB Status Smoking Status 97% 28.9 kg/m2 Postmenopausal Former Smoker Vitals History BMI and BSA Data Body Mass Index Body Surface Area  
 28.9 kg/m 2 1.77 m 2 Preferred Pharmacy Pharmacy Name Phone [facility] [address]  [facility] [phone] Your Updated Medication List  
  
   
This list is accurate as of: 8/10/17  4:47 PM.  Always use your most recent med list.  
  
  
  
  
 albuterol 90 mcg/actuation inhaler Commonly known as:  PROVENTIL HFA, VENTOLIN HFA, PROAIR HFA Take 2 Puffs by inhalation every four (4) hours as needed for Wheezing or Shortness of Breath. aspirin delayed-release 81 mg tablet Take  by mouth daily. calcium-cholecalciferol (d3) 600-125 mg-unit Tab Take  by mouth. Cholecalciferol (Vitamin D3) 50,000 unit Cap Take 1 Cap by mouth every seven (7) days. Indications: VITAMIN D DEFICIENCY  
  
 fluticasone-salmeterol 100-50 mcg/dose diskus inhaler Commonly known as:  ADVAIR Take 1 Puff by inhalation two (2) times a day. Indications: MAINTENANCE THERAPY FOR ASTHMA  
  
 levothyroxine 125 mcg tablet Commonly known as:  SYNTHROID  
TAKE 1 TABLET BY MOUTH DAILY BEFORE BREAKFAST FOR HYPOTHYROIDISM  
  
 losartan 50 mg tablet Commonly known as:  COZAAR Take 1 Tab by mouth daily. polyethylene glycol 17 gram/dose powder Commonly known as:  Manual Rouleau TK 17 GRAMS DISSOLVED IN WATER ONCE A DAY Prescriptions Sent to Pharmacy Refills  
 levothyroxine (SYNTHROID) 125 mcg tablet 3 Sig: TAKE 1 TABLET BY MOUTH DAILY BEFORE BREAKFAST FOR HYPOTHYROIDISM Class: Normal  
 Pharmacy: [facility] [address] [place] [address] [address] AT [facility] of [address] ([facility] Ph #: [phone] To-Do List   
 08/10/2017 Lab:  METABOLIC PANEL, COMPREHENSIVE   
  
 09/10/2017 Lab:  TSH 3RD GENERATION   
  
 12/10/2017 Lab:  LIPID PANEL   
  
 12/10/2017 Lab:  TSH 3RD GENERATION [facility]! Dear Laura: Thank you for requesting a [employer] account. Our records indicate that you already have an active [employer] account. You can access your account anytime at [email or web address] Did you know that you can access your hospital and ER discharge instructions at any time in [employer]?   You can also review all of your test results from your hospital stay or ER visit. Additional Information If you have questions, please visit the Frequently Asked Questions section of the [employer] website at [email or web address] Remember, [employer] is NOT to be used for urgent needs. For medical emergencies, dial 911. Now available from your iPhone and Android! Please provide this summary of care documentation to your next provider. Your primary care clinician is listed as Parth Main. If you have any questions after today's visit, please call [phone].

## 2017-08-10 NOTE — PROGRESS NOTES
Erica Morillo is a 73 y.o. female and presents with Medication Evaluation  . Pt presents for follow up re: right hip, leg and ankle pain    She has transferred from [place] to live close to her 2 sons in [place]. She is not  and still works for [employer] in [place]. She is still traveling to [place]. Hematuria  June 16 and June 17  Stressful but sat in airport and saw bright red blood on underwear. She touched TP to the area but it was not longer there. She does not know for sure if coming from gyn, bladder or colon. She has not had any further episodes. Colonoscopy June 2016  Heavily transformed adenoma 3 year cycle x 5 year ---gi/ dr. Pricilla Ashraf did last colonscopy  Aunt with colon cancer  She had colon resection and was told precancerous very advanced adenoma. She was to have every 3 years then 5 years. She was seen by GI and was told she never needed a colonscopy again. She has concerns about this as he did not have her records and pathology from [facility] Subjective:  Cardiovascular Review:  The patient has hypertension. Diet and Lifestyle: generally follows a low fat low cholesterol diet  Home BP Monitoring: is not measured at home. Pertinent ROS: taking medications as instructed, no medication side effects noted, no TIA's, no chest pain on exertion, no dyspnea on exertion, no swelling of ankles. She sometimes gets lightheaded. Thyroid Review:  Patient is seen for followup of hypothyroidism. Thyroid ROS: denies fatigue, weight changes, heat/cold intolerance, bowel/skin changes or CVS symptoms. Subjective:   Erica Morillo is a 73 y.o. female with hypertension. Hypertension ROS: taking medications as instructed, no medication side effects noted, no TIA's, no chest pain on exertion, no dyspnea on exertion, no swelling of ankles.    New concerns: sometimes has periods of lightheadedness    Thyroid Disease:  Erica Morillo is a 73 y.o. female here for follow up of hypothyroidism. Lab Results   Component Value Date/Time    TSH 5.450 03/23/2017 10:28 AM     Residual symptoms denies fatigue, weight changes, heat/cold intolerance, bowel/skin changes or CVS symptoms. Thyroid medication has been increased since last visit. She ran out of her thyroid medication so has been taking lower dose of 112 mcg for the past 3 days. Review of Systems  Constitutional: negative for fevers, chills, anorexia and weight loss  Eyes:   negative for visual disturbance and irritation  ENT:   negative for tinnitus,sore throat,nasal congestion,ear pains. hoarseness  Respiratory:  negative for cough, hemoptysis, dyspnea,wheezing  CV:   negative for chest pain, palpitations, lower extremity edema  GI:   negative for nausea, vomiting, diarrhea, abdominal pain,melena  Endo:               negative for polyuria,polydipsia,polyphagia,heat intolerance  Genitourinary: negative for frequency, dysuria and hematuria  Integument:  negative for rash and pruritus  Hematologic:  negative for easy bruising and gum/nose bleeding  Musculoskel: negative for myalgias, arthralgias, back pain, muscle weakness, joint pain  Neurological:  negative for headaches, dizziness, vertigo, memory problems and gait   Behavl/Psych: negative for feelings of anxiety, depression, mood changes    Past Medical History:   Diagnosis Date    Asthma     Cataract     Hypertension     Joint pain     feet and hips     Precancerous lesion 2003    Colon    Thyroid disease      Past Surgical History:   Procedure Laterality Date    COLONOSCOPY N/A 6/20/2016    COLONOSCOPY performed by Carmela Singleton MD at [facility] HX BREAST BIOPSY Bilateral     negative    HX CATARACT REMOVAL      HX COLECTOMY      HX COLONOSCOPY      partial colon removed   colonscopy q 5 years     HX DILATION AND CURETTAGE      HX TONSIL AND ADENOIDECTOMY       Social History     Social History    Marital status:      Spouse name: N/A    Number of children: N/A    Years of education: N/A     Social History Main Topics    Smoking status: Former Smoker    Smokeless tobacco: Never Used    Alcohol use 3.0 oz/week     5 Glasses of wine per week    Drug use: No    Sexual activity: No     Other Topics Concern    None     Social History Narrative    Working part time for [employer]    manageble stress        Not     Children: 2 sons healthy    2 grandchildren healthy so far     Family History   Problem Relation Age of Onset    Cancer Mother      cervical cancer    Stroke Mother      hemorhagic    Diabetes Father     Hypertension Father      Current Outpatient Prescriptions   Medication Sig Dispense Refill    levothyroxine (SYNTHROID) 125 mcg tablet TAKE 1 TABLET BY MOUTH DAILY BEFORE BREAKFAST FOR HYPOTHYROIDISM 30 Tab 0    polyethylene glycol (MIRALAX) 17 gram/dose powder TK 17 GRAMS DISSOLVED IN WATER ONCE A DAY  6    losartan (COZAAR) 50 mg tablet Take 1 Tab by mouth daily. 90 Tab 3    Cholecalciferol, Vitamin D3, 50,000 unit cap Take 1 Cap by mouth every seven (7) days. Indications: VITAMIN D DEFICIENCY 12 Cap 0    aspirin delayed-release 81 mg tablet Take  by mouth daily.  calcium-cholecalciferol, d3, 600-125 mg-unit tab Take  by mouth.  albuterol (PROVENTIL HFA, VENTOLIN HFA, PROAIR HFA) 90 mcg/actuation inhaler Take 2 Puffs by inhalation every four (4) hours as needed for Wheezing or Shortness of Breath. 1 Inhaler 3    fluticasone-salmeterol (ADVAIR) 100-50 mcg/dose diskus inhaler Take 1 Puff by inhalation two (2) times a day.  Indications: MAINTENANCE THERAPY FOR ASTHMA 1 Inhaler 1     No Known Allergies    Objective:  Visit Vitals    /50 (BP 1 Location: Left arm, BP Patient Position: Sitting)    Pulse 67    Temp 98 °F (36.7 °C) (Oral)    Resp 16    Ht 5' 2\" (1.575 m)    Wt 158 lb (71.7 kg)    SpO2 97%    BMI 28.9 kg/m2     Physical Exam:   General appearance - alert, well appearing, and in no distress  Mental status - alert, oriented to person, place, and time  EYE-CALVIN, EOMI, corneas normal, no foreign bodies, visual acuity normal both eyes, no periorbital cellulitis  ENT-ENT exam normal, no neck nodes or sinus tenderness  Nose - normal and patent, no erythema, discharge or polyps  Mouth - mucous membranes moist, pharynx normal without lesions  Neck - supple, no significant adenopathy   Chest - clear to auscultation, no wheezes, rales or rhonchi, symmetric air entry   Heart - normal rate, regular rhythm, normal S1, S2, no murmurs, rubs, clicks or gallops   Abdomen - soft, nontender, nondistended, no masses or organomegaly  Lymph- no adenopathy palpable  Ext-peripheral pulses normal, no pedal edema, no clubbing or cyanosis  Skin-Warm and dry. no hyperpigmentation, vitiligo, or suspicious lesions  Neuro -alert, oriented, normal speech, no focal findings or movement disorder noted  Neck-normal C-spine, no tenderness, full ROM without pain      Results for orders placed or performed in visit on 03/23/17   TSH 3RD GENERATION   Result Value Ref Range    TSH 5.450 (H) 0.450 - 0.082 uIU/mL   METABOLIC PANEL, COMPREHENSIVE   Result Value Ref Range    Glucose 96 65 - 99 mg/dL    BUN 10 8 - 27 mg/dL    Creatinine 0.52 (L) 0.57 - 1.00 mg/dL    GFR est non-AA 98 >59 mL/min/1.73    GFR est  >59 mL/min/1.73    BUN/Creatinine ratio 19 11 - 26    Sodium 141 134 - 144 mmol/L    Potassium 4.8 3.5 - 5.2 mmol/L    Chloride 99 96 - 106 mmol/L    CO2 26 18 - 29 mmol/L    Calcium 9.6 8.7 - 10.3 mg/dL    Protein, total 6.5 6.0 - 8.5 g/dL    Albumin 4.7 3.6 - 4.8 g/dL    GLOBULIN, TOTAL 1.8 1.5 - 4.5 g/dL    A-G Ratio 2.6 (H) 1.2 - 2.2    Bilirubin, total 0.7 0.0 - 1.2 mg/dL    Alk.  phosphatase 73 39 - 117 IU/L    AST (SGOT) 19 0 - 40 IU/L    ALT (SGPT) 16 0 - 32 IU/L   MICROALBUMIN, UR, RAND W/ MICROALBUMIN/CREA RATIO   Result Value Ref Range    Creatinine, urine 66.6 Not Estab. mg/dL Microalbumin, urine 3.3 Not Estab. ug/mL    Microalb/Creat ratio (ug/mg creat.) 5.0 0.0 - 30.0 mg/g creat   HEPATITIS C AB   Result Value Ref Range    Hep C Virus Ab <0.1 0.0 - 0.9 s/co ratio   VITAMIN D, 25 HYDROXY   Result Value Ref Range    VITAMIN D, 25-HYDROXY 34.1 30.0 - 100.0 ng/mL     Prevention    Cardiovascular profile  Family hx  Exercising:  Enjoys [employer]   Blood pressure:  Health healthy diet:  Diabetes:  Cholesterol:  Renal function:      Cancer risk profile  Mammogram due in June 2015  Lung none  Colonoscopy dr. Pankaj Meyer  Skin nonhealing in 2 weeks seen dermatology  Gyn abnormal bleeding/discharge/abd pain/pressure      Thyroid sx no sx    Osteopenia prevention  Calcium 1000mg/day yes  Vitamin D 800iu/day yes  Bone density 2015    Mental health scale: 10/10  Depression  Anxiety  Sleep # of hours:  Energy Level:        Immunizations needs tdap  TDAP  Pneumonia vaccine  Flu vaccine  Shingles vaccine  HPV      Diagnoses and all orders for this visit:    1. Essential hypertension  I think she can wean off her losartan  She will do so and mychart me with her readings  -     METABOLIC PANEL, COMPREHENSIVE; Future  -     LIPID PANEL; Future    2. Thyroid disorder  Need to check in 1 month when consistent on 125 mcg following this I have asked for recheck in 3 months to ensure wnl on 125 mcg  -     levothyroxine (SYNTHROID) 125 mcg tablet; TAKE 1 TABLET BY MOUTH DAILY BEFORE BREAKFAST FOR HYPOTHYROIDISM  -     TSH 3RD GENERATION; Future  -     TSH 3RD GENERATION; Future    3. Hematuria  Not sure underlying source  Will check urine  May need to see dr. Niecy Laboy to assess bladder and gyn area. May be vaginal atrophy  -     URINALYSIS W/ RFLX MICROSCOPIC; Future  Will monitor  If sx recur pt will let me know. 4. Colon cancer screening  Pt will follow up with Dr. Mia Davis or she may see another GI  -     OCCULT BLOOD, IMMUNOASSAY (FIT);  Future      I spent 25 min with pt and >50% of the time was spent in management and counseling of pt re: bp weaning off med, concerns re: colonoscopies and perineal bleed      This note will not be viewable in MyChart.

## 2017-08-11 NOTE — PROGRESS NOTES
Nurse Navigator Medicare Wellness Visit performed by NAGA Gill    This is an Initial Sandeep Exam (AWV) (Performed 12 months after IPPE or effective date of Medicare Part B enrollment, Once in a lifetime)    I have reviewed the patient's medical history in detail and updated the computerized patient record. History     Past Medical History:   Diagnosis Date    Asthma     Cataract     Hypertension     Joint pain     feet and hips     Precancerous lesion 2003    Colon, resection     Thyroid disease       Past Surgical History:   Procedure Laterality Date    COLONOSCOPY N/A 6/20/2016    COLONOSCOPY performed by Chaparro Rodriguez MD at [facility] HX BREAST BIOPSY Bilateral     negative    HX CATARACT REMOVAL      HX COLECTOMY      HX COLONOSCOPY      partial colon removed   colonscopy q 5 years     HX DILATION AND CURETTAGE      HX TONSIL AND ADENOIDECTOMY       Current Outpatient Prescriptions   Medication Sig Dispense Refill    levothyroxine (SYNTHROID) 125 mcg tablet TAKE 1 TABLET BY MOUTH DAILY BEFORE BREAKFAST FOR HYPOTHYROIDISM 30 Tab 3    polyethylene glycol (MIRALAX) 17 gram/dose powder TK 17 GRAMS DISSOLVED IN WATER ONCE A DAY  6    losartan (COZAAR) 50 mg tablet Take 1 Tab by mouth daily. 90 Tab 3    Cholecalciferol, Vitamin D3, 50,000 unit cap Take 1 Cap by mouth every seven (7) days. Indications: VITAMIN D DEFICIENCY 12 Cap 0    aspirin delayed-release 81 mg tablet Take  by mouth daily.  calcium-cholecalciferol, d3, 600-125 mg-unit tab Take  by mouth.  albuterol (PROVENTIL HFA, VENTOLIN HFA, PROAIR HFA) 90 mcg/actuation inhaler Take 2 Puffs by inhalation every four (4) hours as needed for Wheezing or Shortness of Breath. 1 Inhaler 3    fluticasone-salmeterol (ADVAIR) 100-50 mcg/dose diskus inhaler Take 1 Puff by inhalation two (2) times a day.  Indications: MAINTENANCE THERAPY FOR ASTHMA 1 Inhaler 1     No Known Allergies  Family History   Problem Relation Age of Onset    Cancer Mother      cervical cancer    Stroke Mother      hemorhagic    Diabetes Father     Hypertension Father      Social History   Substance Use Topics    Smoking status: Former Smoker    Smokeless tobacco: Never Used    Alcohol use 3.0 oz/week     5 Glasses of wine per week     Patient Active Problem List   Diagnosis Code    Advanced care planning/counseling discussion Z71.89         Depression Risk Factor Screening:   Patient denies feelings of being down, depressed or hopeless at this time. Patient states that they have a strong support system within their family & friends. PHQ over the last two weeks 8/11/2017   Little interest or pleasure in doing things Not at all   Feeling down, depressed or hopeless Not at all   Total Score PHQ 2 0     Alcohol Risk Factor Screening: On any occasion during the past 3 months, have you had more than 3 drinks containing alcohol? No    Do you average more than 7 drinks per week? No      Functional Ability and Level of Safety:     Hearing Loss   normal-to-mild    Activities of Daily Living   Self-care. Patient states that she lives in a private residence. Patient states independence in all ADLs & denies the use of assistive devices for ambulation. NN encouraged patient to continue and/ or introduce routine physical exercise into their daily routine as applicable & as recommended by PCP. Patient verbalized understanding & agreement to take this into consideration.    Requires assistance with:   ADL Assessment 8/11/2017   Feeding yourself No Help Needed   Getting from bed to chair No Help Needed   Getting dressed No Help Needed   Bathing or showering No Help Needed   Walk across the room (includes cane/walker) No Help Needed   Using the telphone No Help Needed   Taking your medications No Help Needed   Preparing meals No Help Needed   Managing money (expenses/bills) No Help Needed   Moderately strenuous housework (laundry) No Help Needed Shopping for personal items (toiletries/medicines) No Help Needed   Shopping for groceries No Help Needed   Driving No Help Needed   Climbing a flight of stairs No Help Needed   Getting to places beyond walking distances No Help Needed       Fall Risk   Fall Risk Assessment, last 12 mths 8/11/2017   Able to walk? Yes   Fall in past 12 months? No   Fall with injury? -   Number of falls in past 12 months -   Fall Risk Score -   Patient denies falls within the past year & verbalizes awareness of fall prevention strategies. Abuse Screen   Patient is not abused     Abuse Screening Questionnaire 8/11/2017   Do you ever feel afraid of your partner? N   Are you in a relationship with someone who physically or mentally threatens you? N   Is it safe for you to go home? Y       Review of Systems   Medicare Wellness Visit    Physical Examination     No exam data present    Evaluation of Cognitive Function:  Mood/affect:  happy  Appearance: age appropriate and casually dressed  Family member/caregiver input: None present; however, patient reports a strong support system. No exam performed today, Medicare Wellness Visit. Patient Care Team:  Malik Rogers MD as PCP - General (Internal Medicine)    Advice/Referrals/Counseling   Education and counseling provided:  End-of-Life planning (with patient's consent)  Pneumococcal Vaccine  Influenza Vaccine  Screening Mammography  Screening Pap and pelvic (covered once every 2 years)  Colorectal cancer screening tests  Bone mass measurement (DEXA)  Screening for glaucoma  tdap & shingles vaccinations      Assessment/Plan   1. Patient states that a completed Advanced Medical Directive is at home. NN encouraged patient to bring a copy of the completed Advanced Medical Directive to the office for scanning into the medical record. Patient verbalized understanding & agreement.     2. Patient is up to date on the following immunizations:  Immunization History   Administered Date(s) Administered    Influenza High Dose Vaccine PF 09/30/2016    Influenza Vaccine 10/31/2015    Pneumococcal Conjugate (PCV-13) 10/31/2015    Pneumococcal Polysaccharide (PPSV-23) 06/15/2010    Tdap 03/31/2016    Zoster Vaccine, Live 06/15/2010   Patient confirmed the aforementioned preventative immunization dates are correct. Patient's health maintenance immunization record has been updated & is current. 3. Patient states that she follows the PCP's recommendations for the following screenings: Mammography (report on file from 7/2017), pap/ pelvic, DEXA scan (report on file from 6/2014) & colonoscopy (report on file from 6/20/2016 performed by [facility] Raven Tierney at [facility] with recommended screening follow-up in 5 years, 2021). Patient confirmed the aforementioned preventative screening dates. Today, PCP provided patient with an order for a take home FIT colon screening & the applicable take home kit. 4. Patient was not wearing corrective lenses. Patient reports having a routine eye exam & glaucoma screening within the last year (4/2017) performed by Dr. Geraldo Crespo at [facility] of [address] [place] [address]. A copy of this eye exam report is on file in the patient's medical record. Patient verbalized understanding of all information discussed. Patient was given the opportunity to ask questions. Medication reconciliation completed by MA/ LPN and reviewed by PCP. Patient provided AVS, either a printed version or electronic version in [employer], which includes Medicare Wellness Preventative Screening Table.

## 2017-08-11 NOTE — PATIENT INSTRUCTIONS
Medicare Part B Preventive Services Guidelines/Limitations Date last completed and Frequency Due Date   Bone Mass Measurement  (age 72 & older, biennial) Requires diagnosis related to osteoporosis or estrogen deficiency. Biennial benefit unless patient has history of long-term glucocorticoid tx or baseline is needed because initial test was by other method Completed 6/2014    Recommended every 2 years As recommended by your PCP or Specialist     Cardiovascular Screening Blood Tests (every 5 years)  Total cholesterol, HDL, Triglycerides Order as a panel if possible Completed 3/2016    As recommended by your PCP As recommended by your PCP or Specialist   Colorectal Cancer Screening  -Fecal occult blood test (annual)  -Flexible sigmoidoscopy (5y)  -Screening colonoscopy (10y)  -Barium Enema Age 49-80; After age [de-identified] if history of abnormal results Completed 6/20/2016     Recommended follow-up in 5 years  As recommended by your PCP or Specialist     Counseling to Prevent Tobacco Use (up to 8 sessions per year)  - Counseling greater than 3 and up to 10 minutes  - Counseling greater than 10 minutes Patients must be asymptomatic of tobacco-related conditions to receive as preventive service N/A N/A   Diabetes Screening Tests (at least every 3 years, Medicare covers annually or at 6-month intervals for prediabetic patients)    Fasting blood sugar (FBS) or glucose tolerance test (GTT) Patient must be diagnosed with one of the following:  -Hypertension, Dyslipidemia, obesity, previous impaired FBS or GTT  Or any two of the following: overweight, FH of diabetes, age ? 72, history of gestational diabetes, birth of baby weighing more than 9 pounds Completed 3/2017    Recommended every 3 years for non-diabetics     As recommended by your PCP or Specialist     Glaucoma Screening (no USPSTF recommendation) Diabetes mellitus, family history, , age 48 or over,  American, age 72 or over Completed 4/2017    Recommended annually As recommended by your PCP or Specialist   Seasonal Influenza Vaccination (annually)  Completed 9/2016    Recommended Annually Due Fall 2017   TDAP Vaccination  Completed 3/2016    Recommended every 10 years As recommended by your PCP or Specialist   Zoster (Shingles) Vaccination Covered by Medicare Part D through the pharmacy- PCP provides prescription Completed 6/2010    Recommended once over age 48  Complete   Pneumococcal Vaccination (once after 72)  Pneumo 23- 6/2010  Recommended once over the age of 72    Prevnar 15- 10/2015 Recommended once over the age of 72 Complete        Complete   Screening Mammography (biennial age 54-69) Annually (age 36 or over) Completed 7/2017   As recommended by your PCP or Specialist     Screening Pap Tests and Pelvic Examination (up to age 79 and after 79 if unknown history or abnormal study last 8 years) Every 25 months except high risk As recommended by your PCP or Specialist   As recommended by your PCP or Specialist     Ultrasound Screening for Abdominal Aortic Aneurysm (AAA) (once) Patient must be referred through [facility] and not have had a screening for abdominal aortic aneurysm before under Medicare. Limited to patients who meet one of the following criteria:  - Men who are 73-68 years old and have smoked more than 100 cigarettes in their lifetime.  -Anyone with a FH of AAA  -Anyone recommended for screening by USPSTF Not indicated unless recommended by PCP   Not indicated unless recommended by PCP     Family Practice Management 2011    Please bring a copy of your completed advance medical directive to the office so it may be added to your medical record. Thank you. If you have any questions or concerns please feel free to contact me at [phone]. It was a pleasure meeting you today and participating in your healthcare.   Rohith Flynn RN

## 2017-08-15 ENCOUNTER — HOSPITAL ENCOUNTER (OUTPATIENT)
Dept: LAB | Age: 72
Discharge: HOME | End: 2017-08-15
Payer: MEDICARE

## 2017-08-15 PROCEDURE — 82270 OCCULT BLOOD FECES: CPT

## 2017-08-21 LAB — HEMOCCULT STL QL IA: NEGATIVE

## 2017-09-09 DIAGNOSIS — Z12.11 COLON CANCER SCREENING: ICD-10-CM

## 2017-09-10 DIAGNOSIS — R31.9 HEMATURIA: ICD-10-CM

## 2017-09-10 DIAGNOSIS — E07.9 THYROID DISORDER: ICD-10-CM

## 2017-09-15 ENCOUNTER — HOSPITAL ENCOUNTER (OUTPATIENT)
Dept: LAB | Age: 72
Discharge: HOME | End: 2017-09-15
Payer: MEDICARE

## 2017-09-15 PROCEDURE — 80053 COMPREHEN METABOLIC PANEL: CPT

## 2017-09-15 PROCEDURE — 81003 URINALYSIS AUTO W/O SCOPE: CPT

## 2017-09-15 PROCEDURE — 36415 COLL VENOUS BLD VENIPUNCTURE: CPT

## 2017-09-15 PROCEDURE — 84443 ASSAY THYROID STIM HORMONE: CPT

## 2017-09-16 LAB
ALBUMIN SERPL-MCNC: 4.5 G/DL (ref 3.6–4.8)
ALBUMIN/GLOB SERPL: 2 {RATIO} (ref 1.2–2.2)
ALP SERPL-CCNC: 104 IU/L (ref 39–117)
ALT SERPL-CCNC: 17 IU/L (ref 0–32)
APPEARANCE UR: CLEAR
AST SERPL-CCNC: 25 IU/L (ref 0–40)
BILIRUB SERPL-MCNC: 0.8 MG/DL (ref 0–1.2)
BILIRUB UR QL STRIP: NEGATIVE
BUN SERPL-MCNC: 11 MG/DL (ref 8–27)
BUN/CREAT SERPL: 16 (ref 12–28)
CALCIUM SERPL-MCNC: 9.7 MG/DL (ref 8.7–10.3)
CHLORIDE SERPL-SCNC: 101 MMOL/L (ref 96–106)
CO2 SERPL-SCNC: 25 MMOL/L (ref 18–29)
COLOR UR: YELLOW
CREAT SERPL-MCNC: 0.69 MG/DL (ref 0.57–1)
GLOBULIN SER CALC-MCNC: 2.2 G/DL (ref 1.5–4.5)
GLUCOSE SERPL-MCNC: 94 MG/DL (ref 65–99)
GLUCOSE UR QL: NEGATIVE
HGB UR QL STRIP: NEGATIVE
KETONES UR QL STRIP: NEGATIVE
LEUKOCYTE ESTERASE UR QL STRIP: NEGATIVE
MICRO URNS: NORMAL
NITRITE UR QL STRIP: NEGATIVE
PH UR STRIP: 7.5 [PH] (ref 5–7.5)
POTASSIUM SERPL-SCNC: 4.4 MMOL/L (ref 3.5–5.2)
PROT SERPL-MCNC: 6.7 G/DL (ref 6–8.5)
PROT UR QL STRIP: NEGATIVE
SODIUM SERPL-SCNC: 143 MMOL/L (ref 134–144)
SP GR UR: 1.01 (ref 1–1.03)
TSH SERPL DL<=0.005 MIU/L-ACNC: 0.27 UIU/ML (ref 0.45–4.5)
UROBILINOGEN UR STRIP-MCNC: 0.2 MG/DL (ref 0.2–1)

## 2017-09-18 DIAGNOSIS — E07.9 THYROID DISORDER: ICD-10-CM

## 2017-09-18 RX ORDER — LEVOTHYROXINE SODIUM 112 UG/1
TABLET ORAL
Qty: 30 TAB | Refills: 2 | Status: SHIPPED | OUTPATIENT
Start: 2017-09-18 | End: 2017-12-17 | Stop reason: SDUPTHER

## 2017-09-19 NOTE — PROGRESS NOTES
Please verbally tell pt I decreased her thyroid medication because she was hyperthryoid and please  her new dose at her local pharmacy.  Thank you, lauren

## 2017-12-10 DIAGNOSIS — E07.9 THYROID DISORDER: ICD-10-CM

## 2017-12-10 DIAGNOSIS — I10 ESSENTIAL HYPERTENSION: ICD-10-CM

## 2017-12-14 ENCOUNTER — HOSPITAL ENCOUNTER (OUTPATIENT)
Dept: LAB | Age: 72
Discharge: HOME | End: 2017-12-14
Payer: MEDICARE

## 2017-12-14 PROCEDURE — 80061 LIPID PANEL: CPT

## 2017-12-14 PROCEDURE — 84443 ASSAY THYROID STIM HORMONE: CPT

## 2017-12-14 PROCEDURE — 36415 COLL VENOUS BLD VENIPUNCTURE: CPT

## 2017-12-15 LAB
CHOLEST SERPL-MCNC: 175 MG/DL (ref 100–199)
HDLC SERPL-MCNC: 58 MG/DL
INTERPRETATION, 910389: NORMAL
LDLC SERPL CALC-MCNC: 97 MG/DL (ref 0–99)
TRIGL SERPL-MCNC: 98 MG/DL (ref 0–149)
TSH SERPL DL<=0.005 MIU/L-ACNC: 2.32 UIU/ML (ref 0.45–4.5)
VLDLC SERPL CALC-MCNC: 20 MG/DL (ref 5–40)

## 2017-12-17 DIAGNOSIS — E07.9 THYROID DISORDER: ICD-10-CM

## 2017-12-17 RX ORDER — LEVOTHYROXINE SODIUM 112 UG/1
TABLET ORAL
Qty: 90 TAB | Refills: 1 | Status: SHIPPED | OUTPATIENT
Start: 2017-12-17 | End: 2018-05-19 | Stop reason: SDUPTHER

## 2017-12-19 ENCOUNTER — OFFICE VISIT (OUTPATIENT)
Dept: INTERNAL MEDICINE CLINIC | Age: 72
End: 2017-12-19

## 2017-12-19 ENCOUNTER — HOSPITAL ENCOUNTER (OUTPATIENT)
Dept: LAB | Age: 72
Discharge: HOME | End: 2017-12-19
Payer: MEDICARE

## 2017-12-19 VITALS
HEART RATE: 71 BPM | OXYGEN SATURATION: 97 % | HEIGHT: 62 IN | DIASTOLIC BLOOD PRESSURE: 56 MMHG | SYSTOLIC BLOOD PRESSURE: 105 MMHG | TEMPERATURE: 97.8 F | RESPIRATION RATE: 16 BRPM | BODY MASS INDEX: 27.97 KG/M2 | WEIGHT: 152 LBS

## 2017-12-19 DIAGNOSIS — M79.671 PAIN IN BOTH FEET: ICD-10-CM

## 2017-12-19 DIAGNOSIS — I10 ESSENTIAL HYPERTENSION: ICD-10-CM

## 2017-12-19 DIAGNOSIS — M79.672 PAIN IN BOTH FEET: ICD-10-CM

## 2017-12-19 DIAGNOSIS — N95.0 POST-MENOPAUSAL BLEEDING: Primary | ICD-10-CM

## 2017-12-19 PROCEDURE — 88175 CYTOPATH C/V AUTO FLUID REDO: CPT | Performed by: INTERNAL MEDICINE

## 2017-12-19 PROCEDURE — 87624 HPV HI-RISK TYP POOLED RSLT: CPT | Performed by: INTERNAL MEDICINE

## 2017-12-19 NOTE — MR AVS SNAPSHOT
Visit Information Date & Time Provider Department Dept. Phone Encounter #  
 12/19/2017 10:00 AM Kendal Willis MD [facility] [address] [facility] [id number] Upcoming Health Maintenance Date Due Influenza Age 5 to Adult 8/1/2017 Pneumococcal 65+ Low/Medium Risk (2 of 2 - PPSV23) 8/31/2018* MEDICARE YEARLY EXAM 8/11/2018 GLAUCOMA SCREENING Q2Y 4/21/2019 DTaP/Tdap/Td series (2 - Td) 3/31/2026 COLONOSCOPY 6/20/2026 *Topic was postponed. The date shown is not the original due date. Allergies as of 12/19/2017  Review Complete On: 12/19/2017 By: Kendal Willis MD  
 No Known Allergies Current Immunizations  Reviewed on 3/23/2017 Name Date Influenza High Dose Vaccine PF 9/30/2016 Influenza Vaccine 10/31/2015 Pneumococcal Conjugate (PCV-13) 10/31/2015 Pneumococcal Polysaccharide (PPSV-23) 6/15/2010 Tdap 3/31/2016 11:31 AM  
 Zoster Vaccine, Live 6/15/2010 Not reviewed this visit You Were Diagnosed With   
  
 Codes Comments Post-menopausal bleeding    -  Primary ICD-10-CM: N95.0 ICD-9-CM: 627.1 Pain in both feet     ICD-10-CM: M79.671, C64.226 ICD-9-CM: 729.5 Vitals BP Pulse Temp Resp Height(growth percentile) Weight(growth percentile) 105/56 (BP 1 Location: Left arm, BP Patient Position: Sitting) 71 97.8 °F (36.6 °C) (Oral) 16 5' 2\" (1.575 m) 152 lb (68.9 kg) SpO2 BMI OB Status Smoking Status 97% 27.8 kg/m2 Postmenopausal Former Smoker Vitals History BMI and BSA Data Body Mass Index Body Surface Area  
 27.8 kg/m 2 1.74 m 2 Preferred Pharmacy Pharmacy Name Phone [facility] 1 [address]  [facility] [phone] Your Updated Medication List  
  
   
This list is accurate as of: 12/19/17 10:44 AM.  Always use your most recent med list.  
  
  
  
  
 albuterol 90 mcg/actuation inhaler Commonly known as:  PROVENTIL HFA, VENTOLIN HFA, PROAIR HFA Take 2 Puffs by inhalation every four (4) hours as needed for Wheezing or Shortness of Breath. aspirin delayed-release 81 mg tablet Take  by mouth daily. calcium-cholecalciferol (d3) 600-125 mg-unit Tab Take  by mouth. Cholecalciferol (Vitamin D3) 50,000 unit Cap Take 1 Cap by mouth every seven (7) days. Indications: VITAMIN D DEFICIENCY  
  
 fluticasone-salmeterol 100-50 mcg/dose diskus inhaler Commonly known as:  ADVAIR Take 1 Puff by inhalation two (2) times a day. Indications: MAINTENANCE THERAPY FOR ASTHMA  
  
 levothyroxine 112 mcg tablet Commonly known as:  SYNTHROID  
TAKE 1 TABLET BY MOUTH DAILY BEFORE BREAKFAST FOR HYPOTHYROIDISM  Indications: hypothyroidism  
  
 losartan 50 mg tablet Commonly known as:  COZAAR Take 1 Tab by mouth daily. polyethylene glycol 17 gram/dose powder Commonly known as:  Delvin Deck TK 17 GRAMS DISSOLVED IN WATER ONCE A DAY We Performed the Following PAP IG, APTIMA HPV AND RFX 16/18,45 ([id number]) [[id number] Custom] REFERRAL TO OBSTETRICS AND GYNECOLOGY [REF51 Custom] Comments:  
 Post menpausal bleeding x 2 REFERRAL TO PODIATRY [REF90 Custom] Comments:  
 inserts Referral Information Referral ID Referred By Referred To  
  
 [id number] [place] YukoSingh leyva MD   
   [address] Phone: [phone] Fax: [phone] Visits Status Start Date End Date 1 New Request 12/19/17 12/19/18 If your referral has a status of pending review or denied, additional information will be sent to support the outcome of this decision. Referral ID Referred By Referred To  
 [id number] Alejandra STRICKLAND [facility] [address] [facility] [address] Phone: [phone] Fax: [phone] Visits Status Start Date End Date 1 New Request 12/19/17 12/19/18 If your referral has a status of pending review or denied, additional information will be sent to support the outcome of this decision. Introducing [address]! Dear Shelbie Lutz: Thank you for requesting a [employer] account. Our records indicate that you already have an active [employer] account. You can access your account anytime at [email or web address] Did you know that you can access your hospital and ER discharge instructions at any time in e[employer]? You can also review all of your test results from your hospital stay or ER visit. Additional Information If you have questions, please visit the Frequently Asked Questions section of the e[employer] Systems website at [email or web address] Remember, [employer] Systems is NOT to be used for urgent needs. For medical emergencies, dial 911. Now available from your iPhone and Android! Please provide this summary of care documentation to your next provider. Your primary care clinician is listed as William Garcia. If you have any questions after today's visit, please call [phone].

## 2017-12-19 NOTE — PROGRESS NOTES
Chief Complaint   Patient presents with    Vaginal Bleeding     Post menopausal bleed  Pt presents because she saw 1/2 dollar size bright red blood from vaginal area on her underwear. She reports she had old blood seen in 2017 in June but did not know where it was coming from. She now thinks it is coming from the vaginal area. She is not sexually active. She does feel like she has organs falling out of her vaginal area. No pelvic pain or pressure, no sx with exercise, no uti. Subjective:   Barbara Pearson is a 81 y.o. female with hypertension. Hypertension ROS: taking medications as instructed, no medication side effects noted, no TIA's, no chest pain on exertion, no dyspnea on exertion, no swelling of ankles. New concerns: none. Pain in feet  Pt reports hx of walking and loves to walk  She is developing pain on sides of her feet.  She does not want surgery but may be interested in inserts and evaluation  No falls      Past Medical History:   Diagnosis Date    Asthma     Cataract     Hypertension     Joint pain     feet and hips     Precancerous lesion 2003    Colon, resection     Thyroid disease      Past Surgical History:   Procedure Laterality Date    COLONOSCOPY N/A 6/20/2016    COLONOSCOPY performed by Phyllis Martines MD at [facility] HX BREAST BIOPSY Bilateral     negative    HX CATARACT REMOVAL      HX COLECTOMY      HX COLONOSCOPY      partial colon removed   colonscopy q 5 years     HX DILATION AND CURETTAGE      HX TONSIL AND ADENOIDECTOMY       Social History     Social History    Marital status:      Spouse name: N/A    Number of children: N/A    Years of education: N/A     Social History Main Topics    Smoking status: Former Smoker    Smokeless tobacco: Never Used    Alcohol use 3.0 oz/week     5 Glasses of wine per week    Drug use: No    Sexual activity: No     Other Topics Concern    None     Social History Narrative    Working part time for [employer]    manageble stress        Not     Children: 2 sons healthy    2 grandchildren healthy so far     Family History   Problem Relation Age of Onset    Cancer Mother      cervical cancer    Stroke Mother      hemorhagic    Diabetes Father     Hypertension Father      Current Outpatient Prescriptions   Medication Sig Dispense Refill    levothyroxine (SYNTHROID) 112 mcg tablet TAKE 1 TABLET BY MOUTH DAILY BEFORE BREAKFAST FOR HYPOTHYROIDISM  Indications: hypothyroidism 90 Tab 1    polyethylene glycol (MIRALAX) 17 gram/dose powder TK 17 GRAMS DISSOLVED IN WATER ONCE A DAY  6    losartan (COZAAR) 50 mg tablet Take 1 Tab by mouth daily. 90 Tab 3    Cholecalciferol, Vitamin D3, 50,000 unit cap Take 1 Cap by mouth every seven (7) days. Indications: VITAMIN D DEFICIENCY 12 Cap 0    aspirin delayed-release 81 mg tablet Take  by mouth daily.  calcium-cholecalciferol, d3, 600-125 mg-unit tab Take  by mouth.  albuterol (PROVENTIL HFA, VENTOLIN HFA, PROAIR HFA) 90 mcg/actuation inhaler Take 2 Puffs by inhalation every four (4) hours as needed for Wheezing or Shortness of Breath. 1 Inhaler 3    fluticasone-salmeterol (ADVAIR) 100-50 mcg/dose diskus inhaler Take 1 Puff by inhalation two (2) times a day. Indications: MAINTENANCE THERAPY FOR ASTHMA 1 Inhaler 1     No Known Allergies    Review of Systems - General ROS: negative for - chills, fatigue, fever, hot flashes or malaise  Cardiovascular ROS: no chest pain or dyspnea on exertion  Respiratory ROS: no cough, shortness of breath, or wheezing    Visit Vitals    /56 (BP 1 Location: Left arm, BP Patient Position: Sitting)    Pulse 71    Temp 97.8 °F (36.6 °C) (Oral)    Resp 16    Ht 5' 2\" (1.575 m)    Wt 152 lb (68.9 kg)    SpO2 97%    BMI 27.8 kg/m2     General Appearance:  Well developed, well nourished,alert and oriented x 3, and individual in no acute distress.    Ears/Nose/Mouth/Throat:   Hearing grossly normal.         Neck: Supple, no lad, no bruits   Chest:   Lungs clear to auscultation bilaterally. Cardiovascular:  Regular rate and rhythm, S1, S2 normal, no murmur. Abdomen:   Soft, non-tender, bowel sounds are active. Extremities: No edema bilaterally. Skin: Warm and dry, no suspicious lesions  Gyn: no inguinal lad, ext genitalia atrophy, + prolapse  Cervix in view with atrophy and spot of blood following pap, no masses on pelvic exam                 Diagnoses and all orders for this visit:    1. Post-menopausal bleeding new problem  Gyn + atropy  Will send to gynecology for further evaluation may need endometrial us/bxy  -     REFERRAL TO OBSTETRICS AND GYNECOLOGY  -     PAP IG, APTIMA HPV AND RFX 16/18,45 ([id number])    2. Pain in both feet  Interested in inserts and eval  -     REFERRAL TO PODIATRY      3. Essential hypertension  Cont meds    This note will not be viewable in [address]

## 2017-12-28 ENCOUNTER — OFFICE VISIT (OUTPATIENT)
Dept: OBGYN CLINIC | Age: 72
End: 2017-12-28

## 2017-12-28 VITALS
BODY MASS INDEX: 27.97 KG/M2 | WEIGHT: 152 LBS | DIASTOLIC BLOOD PRESSURE: 80 MMHG | HEIGHT: 62 IN | SYSTOLIC BLOOD PRESSURE: 120 MMHG

## 2017-12-28 DIAGNOSIS — N81.4 UTERINE PROLAPSE: ICD-10-CM

## 2017-12-28 DIAGNOSIS — N95.0 PMB (POSTMENOPAUSAL BLEEDING): Primary | ICD-10-CM

## 2017-12-28 NOTE — PROGRESS NOTES
Postmenopausal bleeding note      Dann Smith is a 81 y.o. female who complains of vaginal bleeding after menopause. She became menopausal approximately > 10 years ago. She has had vaginal bleeding which she describes as spotting lasting up to 1 day. She states it was one spot. Associated symptoms include buldge from the vagina when standing and constipation. Alleviating factors: none     Aggravating factors: none      The patient is not currently sexually active. Last Pap smear:was normal 12/19/2017. Her relevant past medical history:   Past Medical History:   Diagnosis Date    Asthma     Cataract     Hypertension     Joint pain     feet and hips     Precancerous lesion 2003    Colon, resection     Thyroid disease         Past Surgical History:   Procedure Laterality Date    COLONOSCOPY N/A 6/20/2016    COLONOSCOPY performed by Shahida Delong MD at [facility] HX BREAST BIOPSY Bilateral     negative    HX CATARACT REMOVAL      HX COLECTOMY      HX COLONOSCOPY      partial colon removed   colonscopy q 5 years     HX DILATION AND CURETTAGE      HX TONSIL AND ADENOIDECTOMY       Social History     Occupational History    Not on file. Social History Main Topics    Smoking status: Former Smoker    Smokeless tobacco: Never Used    Alcohol use 3.0 oz/week     5 Glasses of wine per week    Drug use: No    Sexual activity: No     Family History   Problem Relation Age of Onset    Cancer Mother      cervical cancer    Stroke Mother      hemorhagic    Diabetes Father     Hypertension Father        No Known Allergies  Prior to Admission medications    Medication Sig Start Date End Date Taking?  Authorizing Provider   levothyroxine (SYNTHROID) 112 mcg tablet TAKE 1 TABLET BY MOUTH DAILY BEFORE BREAKFAST FOR HYPOTHYROIDISM  Indications: hypothyroidism 12/17/17  Yes Marylene Course, MD   polyethylene glycol (MIRALAX) 17 gram/dose powder TK 17 GRAMS DISSOLVED IN WATER ONCE A DAY 3/4/17  Yes Historical Provider   losartan (COZAAR) 50 mg tablet Take 1 Tab by mouth daily. 3/23/17  Yes Sonia Nolan MD   albuterol (PROVENTIL HFA, VENTOLIN HFA, PROAIR HFA) 90 mcg/actuation inhaler Take 2 Puffs by inhalation every four (4) hours as needed for Wheezing or Shortness of Breath. 3/23/17  Yes Sonia Nolan MD   Cholecalciferol, Vitamin D3, 50,000 unit cap Take 1 Cap by mouth every seven (7) days. Indications: VITAMIN D DEFICIENCY 4/4/16  Yes Sonia Nolan MD   aspirin delayed-release 81 mg tablet Take  by mouth daily. Yes Historical Provider   calcium-cholecalciferol, d3, 600-125 mg-unit tab Take  by mouth. Yes Historical Provider   fluticasone-salmeterol (ADVAIR) 100-50 mcg/dose diskus inhaler Take 1 Puff by inhalation two (2) times a day.  Indications: MAINTENANCE THERAPY FOR ASTHMA 3/31/16  Yes Sonia Nolan MD        Review of Systems - History obtained from the patient  Constitutional: negative for weight loss, fever, night sweats  HEENT: negative for hearing loss, earache, congestion, snoring, sorethroat  CV: negative for chest pain, palpitations, edema  Resp: negative for cough, shortness of breath, wheezing  Breast: negative for breast lumps, nipple discharge, galactorrhea  GI: negative for change in bowel habits, abdominal pain, black or bloody stools  : negative for frequency, dysuria, hematuria  MSK: negative for back pain, joint pain, muscle pain  Skin: negative for itching, rash, hives  Neuro: negative for dizziness, headache, confusion, weakness  Psych: negative for anxiety, depression, change in mood  Heme/lymph: negative for bleeding, bruising, pallor      Objective:  Visit Vitals    /80    Ht 5' 2\" (1.575 m)    Wt 152 lb (68.9 kg)    BMI 27.8 kg/m2       Physical Exam:   PHYSICAL EXAMINATION    Constitutional  · Appearance: well-nourished, well developed, alert, in no acute distress    Gastrointestinal  · Abdominal Examination: abdomen non-tender to palpation, normal bowel sounds, no masses present  · Liver and spleen: no hepatomegaly present, spleen not palpable  · Hernias: no hernias identified    Genitourinary  · External Genitalia: normal appearance for age, no discharge present, no tenderness present, no inflammatory lesions present, no masses present, no atrophy present  · Vagina: stage 3 uterine prolapse  · Bladder: non-tender to palpation  · Urethra: appears normal  · Cervix: normal   · Uterus: normal size, shape and consistency  · Adnexa: no adnexal tenderness present, no adnexal masses present  · Perineum: perineum within normal limits, no evidence of trauma, no rashes or skin lesions present  · Anus: anus within normal limits, no hemorrhoids present  · Inguinal Lymph Nodes: no lymphadenopathy present    Skin  · General Inspection: no rash, no lesions identified    Neurologic/Psychiatric  · Mental Status:  · Orientation: grossly oriented to person, place and time  · Mood and Affect: mood normal, affect appropriate    Assessment/Plan:   Postmenopausal bleeding- will rto for ultrasound and end bx, possibly from irritation of the uterine prolapse   Uterine prolapse- will refer to urogyn for surgical management. Instructions given to pt. Handouts given to pt.

## 2017-12-28 NOTE — MR AVS SNAPSHOT
Visit Information Date & Time Provider Department Dept. Phone Encounter #  
 12/28/2017  7:40 AM Kareen Smiley MD [facility] [phone] [id number] Upcoming Health Maintenance Date Due Influenza Age 5 to Adult 8/1/2017 COLONOSCOPY 6/20/2026 Allergies as of 12/28/2017  Review Complete On: 12/28/2017 By: Maggie Turner No Known Allergies Current Immunizations  Reviewed on 3/23/2017 Name Date Influenza High Dose Vaccine PF 9/30/2016 Influenza Vaccine 10/31/2015 Pneumococcal Conjugate (PCV-13) 10/31/2015 Pneumococcal Polysaccharide (PPSV-23) 6/15/2010 Tdap 3/31/2016 11:31 AM  
 Zoster Vaccine, Live 6/15/2010 Not reviewed this visit Vitals BP Height(growth percentile) Weight(growth percentile) BMI OB Status Smoking Status 120/80 5' 2\" (1.575 m) 152 lb (68.9 kg) 27.8 kg/m2 Postmenopausal Former Smoker BMI and BSA Data Body Mass Index Body Surface Area  
 27.8 kg/m 2 1.74 m 2 Preferred Pharmacy Pharmacy Name Phone [facility] 1 [address] [place] [address] [phone] Your Updated Medication List  
  
   
This list is accurate as of: 12/28/17  7:56 AM.  Always use your most recent med list.  
  
  
  
  
 albuterol 90 mcg/actuation inhaler Commonly known as:  PROVENTIL HFA, VENTOLIN HFA, PROAIR HFA Take 2 Puffs by inhalation every four (4) hours as needed for Wheezing or Shortness of Breath. aspirin delayed-release 81 mg tablet Take  by mouth daily. calcium-cholecalciferol (d3) 600-125 mg-unit Tab Take  by mouth. Cholecalciferol (Vitamin D3) 50,000 unit Cap Take 1 Cap by mouth every seven (7) days. Indications: VITAMIN D DEFICIENCY  
  
 fluticasone-salmeterol 100-50 mcg/dose diskus inhaler Commonly known as:  ADVAIR Take 1 Puff by inhalation two (2) times a day.  Indications: MAINTENANCE THERAPY FOR ASTHMA  
  
 levothyroxine 112 mcg tablet Commonly known as:  SYNTHROID  
TAKE 1 TABLET BY MOUTH DAILY BEFORE BREAKFAST FOR HYPOTHYROIDISM  Indications: hypothyroidism  
  
 losartan 50 mg tablet Commonly known as:  COZAAR Take 1 Tab by mouth daily. polyethylene glycol 17 gram/dose powder Commonly known as:  Ale Modi TK 17 GRAMS DISSOLVED IN WATER ONCE A DAY Patient Instructions Vaginal Bleeding After Menopause: Care Instructions Your Care Instructions Vaginal bleeding after menopause can have many causes. Causes may include infection, inflammation, prescription hormones, abnormal growths, and injury. Your doctor may want you to have more tests to find the cause of your vaginal bleeding. Follow-up care is a key part of your treatment and safety. Be sure to make and go to all appointments, and call your doctor if you are having problems. It's also a good idea to know your test results and keep a list of the medicines you take. How can you care for yourself at home? · If your doctor gave you medicine, take it exactly as prescribed. Call your doctor if you think you are having a problem with your medicine. · Do not have sex or put anything inside your vagina until you talk with your doctor. · Do not douche. When should you call for help? Call 911 anytime you think you may need emergency care. For example, call if: 
? · You passed out (lost consciousness). ?Call your doctor now or seek immediate medical care if: 
? · You have severe vaginal bleeding. ? · You are dizzy or lightheaded, or you feel like you may faint. ? · You have new or worse belly or pelvic pain. ? Watch closely for changes in your health, and be sure to contact your doctor if: 
? · Your bleeding gets worse. ? · You think you might be pregnant. ? · You do not get better as expected. Where can you learn more? Go to http://carl-fozia.info/. Enter N304 in the search box to learn more about \"Vaginal Bleeding After Menopause: Care Instructions. \" Current as of: October 13, 2016 Content Version: 11.4 © 2694-6098 [employer]. Care instructions adapted under license by [employer] (which disclaims liability or warranty for this information). If you have questions about a medical condition or this instruction, always ask your healthcare professional. [address] any warranty or liability for your use of this information. Introducing [facility]! Dear Harika Ordonez: Thank you for requesting a frents account. Our records indicate that you already have an active frents account. You can access your account anytime at [email or web address] Did you know that you can access your hospital and ER discharge instructions at any time in frents? You can also review all of your test results from your hospital stay or ER visit. Additional Information If you have questions, please visit the Frequently Asked Questions section of the frents website at [email or web address] Remember, frents is NOT to be used for urgent needs. For medical emergencies, dial 911. Now available from your iPhone and Android! Please provide this summary of care documentation to your next provider. Your primary care clinician is listed as Adilene Chandler. If you have any questions after today's visit, please call [phone].

## 2017-12-28 NOTE — PATIENT INSTRUCTIONS
Vaginal Bleeding After Menopause: Care Instructions  Your Care Instructions    Vaginal bleeding after menopause can have many causes. Causes may include infection, inflammation, prescription hormones, abnormal growths, and injury. Your doctor may want you to have more tests to find the cause of your vaginal bleeding. Follow-up care is a key part of your treatment and safety. Be sure to make and go to all appointments, and call your doctor if you are having problems. It's also a good idea to know your test results and keep a list of the medicines you take. How can you care for yourself at home? · If your doctor gave you medicine, take it exactly as prescribed. Call your doctor if you think you are having a problem with your medicine. · Do not have sex or put anything inside your vagina until you talk with your doctor. · Do not douche. When should you call for help? Call 911 anytime you think you may need emergency care. For example, call if:  ? · You passed out (lost consciousness). ?Call your doctor now or seek immediate medical care if:  ? · You have severe vaginal bleeding. ? · You are dizzy or lightheaded, or you feel like you may faint. ? · You have new or worse belly or pelvic pain. ? Watch closely for changes in your health, and be sure to contact your doctor if:  ? · Your bleeding gets worse. ? · You think you might be pregnant. ? · You do not get better as expected. Where can you learn more? Go to http://carl-fozia.info/. Enter N304 in the search box to learn more about \"Vaginal Bleeding After Menopause: Care Instructions. \"  Current as of: October 13, 2016  Content Version: 11.4  © 3720-8843 [employer], [employer]. Care instructions adapted under license by [employer] (which disclaims liability or warranty for this information).  If you have questions about a medical condition or this instruction, always ask your healthcare professional. [employer] disclaims any warranty or liability for your use of this information.

## 2018-01-02 ENCOUNTER — OFFICE VISIT (OUTPATIENT)
Dept: OBGYN CLINIC | Age: 73
End: 2018-01-02

## 2018-01-02 DIAGNOSIS — N95.0 PMB (POSTMENOPAUSAL BLEEDING): Primary | ICD-10-CM

## 2018-01-02 NOTE — PROGRESS NOTES
Ultrasound followup    Baljeet Duenas is a 81 y.o. female is here today to review the results of her ultrasound evaluation. Her U/S evaluation is performed because of a previous encounter revealing PMB which was identified several weeks ago. She is here for an initial ultrasound study. The sonogram: within normal limits. UTERUS IS ANTEVERTED, NORMAL IN SIZE AND ECHOGENICITY. ENDOMETRIUM MEASURES 1-2MM IN THICKNESS. NO EVIDENCE OF MASS OR ABNORMALITY SEEN  WITHIN THE ENDOMETRIAL CAVITY. RIGHT OVARY APPEARS WITHIN NORMAL LIMITS. LEFT OVARY APPEARS WITHIN NORMAL LIMITS. NO FREE FLUID SEEN IN THE CDS. See detailed report for more information. Past Medical History:   Diagnosis Date    Asthma     Cataract     Hypertension     Joint pain     feet and hips     Precancerous lesion 2003    Colon, resection     Thyroid disease      Past Surgical History:   Procedure Laterality Date    COLONOSCOPY N/A 6/20/2016    COLONOSCOPY performed by Henrietta Lubin MD at [address] [facility] HX BREAST BIOPSY Bilateral     negative    HX CATARACT REMOVAL      HX COLECTOMY      HX COLONOSCOPY      partial colon removed   colonscopy q 5 years     HX DILATION AND CURETTAGE      HX TONSIL AND ADENOIDECTOMY       Social History     Occupational History    Not on file. Social History Main Topics    Smoking status: Former Smoker    Smokeless tobacco: Never Used    Alcohol use 3.0 oz/week     5 Glasses of wine per week    Drug use: No    Sexual activity: No     Family History   Problem Relation Age of Onset    Cancer Mother      cervical cancer    Stroke Mother      hemorhagic    Diabetes Father     Hypertension Father        No Known Allergies  Prior to Admission medications    Medication Sig Start Date End Date Taking?  Authorizing Provider   levothyroxine (SYNTHROID) 112 mcg tablet TAKE 1 TABLET BY MOUTH DAILY BEFORE BREAKFAST FOR HYPOTHYROIDISM  Indications: hypothyroidism 12/17/17   Prerna Eckert MD   polyethylene glycol (MIRALAX) 17 gram/dose powder TK 17 GRAMS DISSOLVED IN WATER ONCE A DAY 3/4/17   Historical Provider   losartan (COZAAR) 50 mg tablet Take 1 Tab by mouth daily. 3/23/17   Antonio Young MD   albuterol (PROVENTIL HFA, VENTOLIN HFA, PROAIR HFA) 90 mcg/actuation inhaler Take 2 Puffs by inhalation every four (4) hours as needed for Wheezing or Shortness of Breath. 3/23/17   Antonio Young MD   Cholecalciferol, Vitamin D3, 50,000 unit cap Take 1 Cap by mouth every seven (7) days. Indications: VITAMIN D DEFICIENCY 4/4/16   Antonio Young MD   aspirin delayed-release 81 mg tablet Take  by mouth daily. Historical Provider   calcium-cholecalciferol, d3, [id number] mg-unit tab Take  by mouth. Historical Provider   fluticasone-salmeterol (ADVAIR) 100-50 mcg/dose diskus inhaler Take 1 Puff by inhalation two (2) times a day. Indications: MAINTENANCE THERAPY FOR ASTHMA 3/31/16   Antonio Young MD        Review of Systems: History obtained from the patient  Constitutional: negative for weight loss, fever, night sweats  Breast: negative for breast lumps, nipple discharge, galactorrhea  GI: negative for change in bowel habits, abdominal pain, black or bloody stools  : negative for frequency, dysuria, hematuria, vaginal discharge  MSK: negative for back pain, joint pain, muscle pain  Skin: negative for itching, rash, hives  Psych: negative for anxiety, depression, change in mood      Objective: There were no vitals taken for this visit. Physical Exam:   PHYSICAL EXAMINATION    Constitutional  · Appearance: well-nourished, well developed, alert, in no acute distress    Skin  · General Inspection: no rash, no lesions identified    Neurologic/Psychiatric  · Mental Status:  · Orientation: grossly oriented to person, place and time  · Mood and Affect: mood normal, affect appropriate      ASSESSMENT/PLAN:  PMB with 1-2 mm endometrial stripe.  Discussed unlikely related to uterus, more likely due to prolpase. Rec f/u with Dr. Melina Cazares for pre-op counseling. Biopsy not performed due to thin endometrial stripe. RTO prn if symptoms persist or worsen. Instructions given to pt. Handouts given to pt.

## 2018-01-02 NOTE — MR AVS SNAPSHOT
Visit Information Date & Time Provider Department Dept. Phone Encounter #  
 1/2/2018  9:10 AM Jo Ann Manley MD [facility] [phone] [id number] Upcoming Health Maintenance Date Due Influenza Age 5 to Adult 8/1/2017 COLONOSCOPY 6/20/2026 Allergies as of 1/2/2018  Review Complete On: 1/2/2018 By: Laney Nose No Known Allergies Current Immunizations  Reviewed on 3/23/2017 Name Date Influenza High Dose Vaccine PF 9/30/2016 Influenza Vaccine 10/31/2015 Pneumococcal Conjugate (PCV-13) 10/31/2015 Pneumococcal Polysaccharide (PPSV-23) 6/15/2010 Tdap 3/31/2016 11:31 AM  
 Zoster Vaccine, Live 6/15/2010 Not reviewed this visit Vitals OB Status Smoking Status Postmenopausal Former Smoker Preferred Pharmacy Pharmacy Name Phone [facility] [address]  [facility] [phone] Your Updated Medication List  
  
   
This list is accurate as of: 1/2/18  9:20 AM.  Always use your most recent med list.  
  
  
  
  
 albuterol 90 mcg/actuation inhaler Commonly known as:  PROVENTIL HFA, VENTOLIN HFA, PROAIR HFA Take 2 Puffs by inhalation every four (4) hours as needed for Wheezing or Shortness of Breath. aspirin delayed-release 81 mg tablet Take  by mouth daily. calcium-cholecalciferol (d3) 600-125 mg-unit Tab Take  by mouth. Cholecalciferol (Vitamin D3) 50,000 unit Cap Take 1 Cap by mouth every seven (7) days. Indications: VITAMIN D DEFICIENCY  
  
 fluticasone-salmeterol 100-50 mcg/dose diskus inhaler Commonly known as:  ADVAIR Take 1 Puff by inhalation two (2) times a day. Indications: MAINTENANCE THERAPY FOR ASTHMA  
  
 levothyroxine 112 mcg tablet Commonly known as:  SYNTHROID  
TAKE 1 TABLET BY MOUTH DAILY BEFORE BREAKFAST FOR HYPOTHYROIDISM  Indications: hypothyroidism losartan 50 mg tablet Commonly known as:  COZAAR Take 1 Tab by mouth daily. polyethylene glycol 17 gram/dose powder Commonly known as:  Mindya Aleja TK 17 GRAMS DISSOLVED IN WATER ONCE A DAY Introducing [facility]! Dear Oneal Milton: Thank you for requesting a Sy[employer] account. Our records indicate that you already have an active Syapse account. You can access your account anytime at [email or web address] Did you know that you can access your hospital and ER discharge instructions at any time in Syapse? You can also review all of your test results from your hospital stay or ER visit. Additional Information If you have questions, please visit the Frequently Asked Questions section of the Syapse website at [email or web address] Remember, Syapse is NOT to be used for urgent needs. For medical emergencies, dial 911. Now available from your iPhone and Android! Please provide this summary of care documentation to your next provider. Your primary care clinician is listed as Iliana Sadler. If you have any questions after today's visit, please call [phone].

## 2018-01-09 ENCOUNTER — OFFICE VISIT (OUTPATIENT)
Dept: OBGYN CLINIC | Age: 73
End: 2018-01-09

## 2018-01-09 VITALS
DIASTOLIC BLOOD PRESSURE: 80 MMHG | BODY MASS INDEX: 27.97 KG/M2 | SYSTOLIC BLOOD PRESSURE: 120 MMHG | WEIGHT: 152 LBS | HEIGHT: 62 IN

## 2018-01-09 DIAGNOSIS — N81.4 CYSTOCELE WITH UTERINE PROLAPSE: ICD-10-CM

## 2018-01-09 DIAGNOSIS — N81.6 RECTOCELE: ICD-10-CM

## 2018-01-09 DIAGNOSIS — N81.4 UTERINE PROLAPSE: Primary | ICD-10-CM

## 2018-01-09 DIAGNOSIS — N39.3 SUI (STRESS URINARY INCONTINENCE, FEMALE): ICD-10-CM

## 2018-01-09 NOTE — PROGRESS NOTES
Prolapse evaluation  Chief Complaint   Uterine Prolapse    HPI:  The patient specifically complains of vaginal pressure and a bulging sensation at the introitus. The bulge is visible to the patient. The patient states she has some difficulty emptying her bladder. She states that she has no problems completing bowel movements. She also complains of urinary incontinence, which is mild. She states the symptoms are accentuated by lifting, straining, and exercise. The symptoms are partially relieved by lying down. She has had no pelvic pain associated with this. The character of the pain is described as a pressure sensation located in the vagina. The patient has the following additional complaints: mild spotting  2 normal vaginal deliveries  Past Medical History:   Diagnosis Date    Asthma     Cataract     Hypertension     Joint pain     feet and hips     Precancerous lesion 2003    Colon, resection     Thyroid disease      Past Surgical History:   Procedure Laterality Date    COLONOSCOPY N/A 6/20/2016    COLONOSCOPY performed by Almaz Maciel MD at [address] [facility] HX BREAST BIOPSY Bilateral     negative    HX CATARACT REMOVAL      HX COLECTOMY      HX COLONOSCOPY      partial colon removed   colonscopy q 5 years     HX DILATION AND CURETTAGE      HX TONSIL AND ADENOIDECTOMY       Social History     Occupational History    Not on file. Social History Main Topics    Smoking status: Former Smoker    Smokeless tobacco: Never Used    Alcohol use 3.0 oz/week     5 Glasses of wine per week    Drug use: No    Sexual activity: No     Family History   Problem Relation Age of Onset    Cancer Mother      cervical cancer    Stroke Mother      hemorhagic    Diabetes Father     Hypertension Father        No Known Allergies  Prior to Admission medications    Medication Sig Start Date End Date Taking?  Authorizing Provider   levothyroxine (SYNTHROID) 112 mcg tablet TAKE 1 TABLET BY MOUTH DAILY BEFORE BREAKFAST FOR HYPOTHYROIDISM  Indications: hypothyroidism 12/17/17   Evelin Willoughby MD   polyethylene glycol (MIRALAX) 17 gram/dose powder TK 17 GRAMS DISSOLVED IN WATER ONCE A DAY 3/4/17   Historical Provider   losartan (COZAAR) 50 mg tablet Take 1 Tab by mouth daily. 3/23/17   Evelin Willoughby MD   albuterol (PROVENTIL HFA, VENTOLIN HFA, PROAIR HFA) 90 mcg/actuation inhaler Take 2 Puffs by inhalation every four (4) hours as needed for Wheezing or Shortness of Breath. 3/23/17   Evelin Willoughby MD   Cholecalciferol, Vitamin D3, 50,000 unit cap Take 1 Cap by mouth every seven (7) days. Indications: VITAMIN D DEFICIENCY 4/4/16   Evelin Willoughby MD   aspirin delayed-release 81 mg tablet Take  by mouth daily. Historical Provider   calcium-cholecalciferol, d3, [id number] mg-unit tab Take  by mouth. Historical Provider   fluticasone-salmeterol (ADVAIR) 100-50 mcg/dose diskus inhaler Take 1 Puff by inhalation two (2) times a day.  Indications: MAINTENANCE THERAPY FOR ASTHMA 3/31/16   Evelin Willoughby MD        Review of Systems: History obtained from the patient  Constitutional: negative for weight loss, fever, night sweats  HEENT: negative for hearing loss, earache, congestion, snoring, sorethroat  CV: negative for chest pain, palpitations, edema  Resp: negative for cough, shortness of breath, wheezing  Breast: negative for breast lumps, nipple discharge, galactorrhea  GI: negative for change in bowel habits, abdominal pain, black or bloody stools  : negative for frequency, dysuria, hematuria, vaginal discharge  MSK: negative for back pain, joint pain, muscle pain  Skin: negative for itching, rash, hives  Neuro: negative for dizziness, headache, confusion, weakness  Psych: negative for anxiety, depression, change in mood  Heme/lymph: negative for bleeding, bruising, pallor    Objective:  Visit Vitals    /80    Ht 5' 2\" (1.575 m)    Wt 152 lb (68.9 kg)    BMI 27.8 kg/m2       Physical Exam:     Constitutional  · Appearance: well-nourished, well developed, alert, in no acute distress    HENT  · Head and Face: appears normal    Gastrointestinal  · Abdominal Examination: abdomen non-tender to palpation, normal bowel sounds, no masses present  · Liver and spleen: no hepatomegaly present, spleen not palpable  · Hernias: no hernias identified    Genitourinary  · External Genitalia: moderate gaping of the introitus, normal appearance for age, no discharge present, no tenderness present, no inflammatory lesions present, no masses present, no atrophy present  · Vagina: normal vaginal vault with severe cystocele, moderate rectocele, moderate vaginal apex prolapse, no discharge present, no inflammatory lesions present, no masses present  · Bladder: non-tender to palpation  · Urethra: appears normal  · Cervix: normal but prolapses to the introitus   · Uterus: severe prolapse, otherwise normal size, shape and consistency  · Adnexa: no adnexal tenderness present, no adnexal masses present  · Perineum: perineum within normal limits, no evidence of trauma, no rashes or skin lesions present  · Anus: anus within normal limits, no hemorrhoids present  · Inguinal Lymph Nodes: no lymphadenopathy present    Skin  · General Inspection: no rash, no lesions identified    Neurologic/Psychiatric  · Mental Status:  · Orientation: grossly oriented to person, place and time  · Mood and Affect: mood normal, affect appropriate    Assessment:  Severe prolapse of the bladder and uterus. Moderate gaping of the introitus and pt's active life makes a pessary not the best choice. Pt given info regarding colpocleisis and sacrocolpopexy. Plan:   Schedule urodynamics. Pt will review info and decide what she wants to do at [facility] ([facility] RTO prn if symptoms persist or worsen. Instructions given to pt. Handouts given to pt.     I spent 40 minutes with the patient, more than half of which was face to face counseling.

## 2018-01-12 ENCOUNTER — OFFICE VISIT (OUTPATIENT)
Dept: OBGYN CLINIC | Age: 73
End: 2018-01-12

## 2018-01-12 VITALS
BODY MASS INDEX: 27.97 KG/M2 | SYSTOLIC BLOOD PRESSURE: 120 MMHG | DIASTOLIC BLOOD PRESSURE: 80 MMHG | WEIGHT: 152 LBS | HEIGHT: 62 IN

## 2018-01-12 DIAGNOSIS — N81.4 UTERINE PROLAPSE: ICD-10-CM

## 2018-01-12 DIAGNOSIS — N81.11 CYSTOCELE, MIDLINE: Primary | ICD-10-CM

## 2018-01-12 DIAGNOSIS — N81.6 RECTOCELE: ICD-10-CM

## 2018-01-12 NOTE — PROGRESS NOTES
[facility]  OFFICE PROCEDURE PROGRESS NOTE           Chart reviewed for the following:  I, Juan Alejo, have reviewed the History, Physical and updated the Allergic reactions for [address] performed immediately prior to start of procedure:  Malvin Schulz, have performed the following reviews on [address] [place] [address] prior to the start of the procedure:      * Patient was identified by name and date of birth   * Agreement on procedure being performed was verified  * Risks and Benefits explained to the patient  * Procedure site verified and marked as necessary  * Patient was positioned for comfort  * Consent was signed and verified      Time: 1:25pm        Date of procedure: 1/12/2018     Procedure performed by: Esau Scales MD     Provider assisted by: Juan Alejo MA     Patient assisted by: self     How tolerated by patient: tolerated the procedure well with no complications     Post Procedural Pain Scale: 0 - No Hurt     Comments: none      Procedure note: Urodynamics      Indications:  [address] [place] [address] is a No obstetric history on file. ,  81 y.o. female [facility] No LMP recorded. Patient is postmenopausal.  She complains of moderate and life-altering symptoms of prolapse for years. She is here today for urodynamic testing. Procedure: Urodynamic testing was performed with the Lumax machine. Pressure flow testing via Lumax device was performed. Voided volume was 0 cc. Maximum flow rate achieved was 0 cc/sec. Average flow rate was 0 cc/sec. Was unable to void on command during 2 attempts. Post void residual was 180 cc. She felt the first filling sensation at 286 cc, had the urge to urinate at 363 cc, strong urge at 410 cc, and very uncomfortable at 410 cc. Bladder filling showed normal pressures   The total bladder volume achieved was 411 cc. Leak point pressure was not achieved. Q-tip deflection was 25 degrees.    She was not noted to lose urine when she coughed in the sitting position. EMG was not completed. Urethral sphincter length is 1.5 cm. Maximum urethral closure pressure was 36 cm of water. Impression:  Severe prolapse at risk for USI with repair.   Plan:  TVT with other surgery and 400 cc voiding trial.  Pt elects LSH/BSO/SCP/TVT-O/cysto    Care Provider: Komal Molina M.D.

## 2018-02-01 DIAGNOSIS — N81.6 RECTOCELE: ICD-10-CM

## 2018-02-01 DIAGNOSIS — N39.3 FEMALE STRESS INCONTINENCE: ICD-10-CM

## 2018-02-01 DIAGNOSIS — N81.4 UTERINE PROLAPSE: ICD-10-CM

## 2018-02-01 DIAGNOSIS — N81.11 CYSTOCELE, MIDLINE: Primary | ICD-10-CM

## 2018-02-16 ENCOUNTER — TELEPHONE (OUTPATIENT)
Dept: OBGYN CLINIC | Age: 73
End: 2018-02-16

## 2018-02-16 NOTE — TELEPHONE ENCOUNTER
Ok to fly 3 weeks later. Can drive usually within a week. Only recommendation is to not sit (drive) for more than 3 hours without stopping because of blood clot risk. Full instructions below. DaVinci Laparoscopic hysterectomy  Procedure-specific postoperative instructions  General Instructions   Make an appointment to come back to the office in about 2-3 weeks. Eat and drink your normal diet. Take laxatives as needed. Call us if you have questions or problems. Incision care   With this procedure you will have 5 small incisions. One or more may be more sensitive than the others. Ice packs or heating pad (low setting--don't burn yourself) can be helpful. Treat these incisions like normal skin. You can shower and wash this skin as you normally would. You need to call the office if there is spreading redness around the incision, it feels hot, or has drainage other than just a mild blood-tinged appearance. Supracervical Hysterectomy   If the uterus was removed but the cervix was not removed, then you have NO restriction. This may be hard to believe, but you can literally do anything you want to do and no damage will occur. Now, you may not FEEL like doing much. Any major surgery is a stress on your body. So you may be tired, have no energy, may feel weak and listless. But no damage will occur if you lift or do strenuous activity. Listen to your body. If it is telling you to rest, do so. If you feel good, you won't hurt anything by going shopping of doing household activities. Total Hysterectomy   If the uterus and cervix were removed, recovery is different. This top of the vagina where the cervix was is now closed with suture. That area has to heal before you do strenuous activities, put anything in the vagina, or lift anything that you feel you have to strain, typically that would be 15 pounds or more.    The only thing holding your insides (intestines) in place are the sutures holding the top of the vagina closed, so the last thing you want is for that to fail. Abstaining from sex is mandatory for 6 weeks. Infection at that area is a possible cause of failure. Symptoms of that could be pain getting worse in the pelvic area instead of better, fever, or a change from the normal discharge to having a foul odor. Call us if those things are happening. Antibiotics will usually clear that up quickly. Regarding Ovaries   If your ovaries (or one ovary) were left in place you will not need hormones. There may be some fluctuations in hormone levels and resulting temperature or mood issues but these should be mild and temporary. If your ovaries have been removed you will need to use replacement hormones (unless you've had breast cancer or blood clots in lungs or legs in the past). Within 48 hours after removal of ovaries, hormone levels fall and most women will have some degree of menopausal symptoms. These can be very difficult. We use an estrogen patch for at least the first 6 weeks after surgery. This will alleviate nearly all of the symptoms you would have. Dr. Walt Welch will discuss the long term use of estrogen with you at the postoperative visit. The patch is used postoperatively because it has minimal increase in the risk of blood clots, etc.  If you have any problems with the patch, call the office. Concepcion Sacrocolpopexy  Procedure-specific postoperative instructions      What to expect   You have had major surgery through tiny incisions. The most important factor after this surgery to anchor everything back in place is to not tear it loose. That means no significant lifting for 6 weeks. After that healing is complete and you resume all activites. The chances of undoing the surgery by doing anything are very small, but not zero. You can expect some pulling or sharp sensations.   It is not likely to be severe and other than lifting or falling you will not damage this repair doing daily activities. As always, if you feel like something is amiss, call the office. TVT  Tension free Vaginal Tape  Procedure-specific postoperative instructions    Bladder care   Your bladder may be irritable following this surgery, so you may need to go more frequently or urgently than before. Please be patient, this will usually go away within the first six weeks without treatment. It is possible, however to get a bladder infection after surgery. If it is burning as you urinate, symptoms are getting worse, or you see blood in your urine consistently, call us. Otherwise, it is important that you empty your bladder when you go. You may need to take extra time or even press gently above your pubic bone as you urinate to help your bladder empty. What to expect   You have had minor surgery through a tiny incision in the front of the vagina. The most important factor after this surgery to anchor the bladder neck back in place is to not tear it loose. That means no significant lifting for 6 weeks. After that healing is complete and you can resume normal activity. The chances of undoing the surgery are very small, but not zero. You can expect some pulling or sharp sensations. It is not likely to be severe and other than lifting heavy objects or falling you will not damage this repair doing daily activities. As always, if you feel like something is amiss, call the office.

## 2018-02-16 NOTE — TELEPHONE ENCOUNTER
Call received at 11:29am      81year old patient last seen in the office on 1/21/18.  Patient has surgery on 3/1/18 TVT with other surgery and 400 cc voiding trial.  Pt elects LSH/BSO/SCP/TVT-O/cysto        Patient calling with 3 questions:  1) Will it be ok for her to fly to [facility] 3 weeks after surgery    2)  When can she drive    3) What are her restriction for driving      Please advise

## 2018-02-23 ENCOUNTER — TELEPHONE (OUTPATIENT)
Dept: OBGYN CLINIC | Age: 73
End: 2018-02-23

## 2018-02-23 RX ORDER — POLYETHYLENE GLYCOL 3350, SODIUM SULFATE ANHYDROUS, SODIUM BICARBONATE, SODIUM CHLORIDE, POTASSIUM CHLORIDE 236; 22.74; 6.74; 5.86; 2.97 G/4L; G/4L; G/4L; G/4L; G/4L
4 POWDER, FOR SOLUTION ORAL
Qty: 4000 ML | Refills: 0 | Status: SHIPPED | OUTPATIENT
Start: 2018-02-23 | End: 2018-02-23

## 2018-02-23 NOTE — TELEPHONE ENCOUNTER
Patient is in need of Golytely prescription to be sent to [facility] for upcoming surgery with [facility] NEXT WEEK. 3/1/18

## 2018-02-26 ENCOUNTER — HOSPITAL ENCOUNTER (OUTPATIENT)
Dept: PREADMISSION TESTING | Age: 73
Discharge: HOME | End: 2018-02-26
Payer: MEDICARE

## 2018-02-26 VITALS
RESPIRATION RATE: 16 BRPM | TEMPERATURE: 97.6 F | DIASTOLIC BLOOD PRESSURE: 59 MMHG | SYSTOLIC BLOOD PRESSURE: 113 MMHG | OXYGEN SATURATION: 100 % | HEART RATE: 58 BPM | HEIGHT: 62 IN | BODY MASS INDEX: 26.37 KG/M2 | WEIGHT: 143.3 LBS

## 2018-02-26 DIAGNOSIS — N81.11 CYSTOCELE, MIDLINE: ICD-10-CM

## 2018-02-26 DIAGNOSIS — N39.3 FEMALE STRESS INCONTINENCE: ICD-10-CM

## 2018-02-26 DIAGNOSIS — N81.4 UTERINE PROLAPSE: ICD-10-CM

## 2018-02-26 DIAGNOSIS — N81.6 RECTOCELE: ICD-10-CM

## 2018-02-26 LAB
ABO + RH BLD: NORMAL
ANION GAP SERPL CALC-SCNC: 11 MMOL/L (ref 5–15)
BLOOD GROUP ANTIBODIES SERPL: NORMAL
BUN SERPL-MCNC: 9 MG/DL (ref 6–20)
BUN/CREAT SERPL: 17 (ref 12–20)
CALCIUM SERPL-MCNC: 9.2 MG/DL (ref 8.5–10.1)
CHLORIDE SERPL-SCNC: 103 MMOL/L (ref 97–108)
CO2 SERPL-SCNC: 28 MMOL/L (ref 21–32)
CREAT SERPL-MCNC: 0.52 MG/DL (ref 0.55–1.02)
ERYTHROCYTE [DISTWIDTH] IN BLOOD BY AUTOMATED COUNT: 11.6 % (ref 11.5–14.5)
GLUCOSE SERPL-MCNC: 88 MG/DL (ref 65–100)
HCT VFR BLD AUTO: 41.4 % (ref 35–47)
HGB BLD-MCNC: 13.7 G/DL (ref 11.5–16)
MCH RBC QN AUTO: 31.9 PG (ref 26–34)
MCHC RBC AUTO-ENTMCNC: 33.1 G/DL (ref 30–36.5)
MCV RBC AUTO: 96.3 FL (ref 80–99)
NRBC # BLD: 0 K/UL (ref 0–0.01)
NRBC BLD-RTO: 0 PER 100 WBC
PLATELET # BLD AUTO: 248 K/UL (ref 150–400)
PMV BLD AUTO: 10.6 FL (ref 8.9–12.9)
POTASSIUM SERPL-SCNC: 4.2 MMOL/L (ref 3.5–5.1)
RBC # BLD AUTO: 4.3 M/UL (ref 3.8–5.2)
SODIUM SERPL-SCNC: 142 MMOL/L (ref 136–145)
SPECIMEN EXP DATE BLD: NORMAL
WBC # BLD AUTO: 4.7 K/UL (ref 3.6–11)

## 2018-02-26 PROCEDURE — 93005 ELECTROCARDIOGRAM TRACING: CPT

## 2018-02-26 PROCEDURE — 86900 BLOOD TYPING SEROLOGIC ABO: CPT | Performed by: OBSTETRICS & GYNECOLOGY

## 2018-02-26 PROCEDURE — 36415 COLL VENOUS BLD VENIPUNCTURE: CPT | Performed by: OBSTETRICS & GYNECOLOGY

## 2018-02-26 PROCEDURE — 80048 BASIC METABOLIC PNL TOTAL CA: CPT | Performed by: ANESTHESIOLOGY

## 2018-02-26 PROCEDURE — 85027 COMPLETE CBC AUTOMATED: CPT | Performed by: OBSTETRICS & GYNECOLOGY

## 2018-02-26 NOTE — PERIOP NOTES
[facility]  PREOPERATIVE INSTRUCTIONS    Surgery Date:   3/1/2018      Surgery arrival time given by surgeon: NO  (If [facility] staff will call you between 3pm - 7pm the day before surgery with your arrival time. If your surgery is on a Monday, we will call you the preceding Friday. Please call [phone] after 7pm if you did not receive your arrival time.)  1. Report  to the 2nd Floor Admitting Desk on the day of your surgery. Bring your insurance card, photo identification, and any copayment (if applicable). 2. You must have a responsible adult to drive you home and stay with you the first 24 hours after surgery if you are going home the same day of your surgery. 3. Nothing to eat or drink after midnight the night before surgery. This means NO water, gum, mints, coffee, juice, etc.    4. MEDICATIONS TO TAKE THE MORNING OF SURGERY WITH A SIP OF WATER:Take levothyroxine, hold your losartan day of surgery. Stop vitamins/supplements now if you haven't already. 5. No alcoholic beverages 24 hours before and after your surgery. 6. If you are being admitted to the hospital,please leave personal belongings/luggage in your car until you have an assigned hospital room number. ( The hospital discharge time is 12 PM NOON. Your adult  should be at the hospital prior to the noon discharge time unless otherwise instructed.)   7. STOP Aspirin and/or any non-steroidal anti-inflammatory drugs (i.e. Ibuprofen, Naproxen, Advil, Aleve) as directed by your surgeon. You may take Tylenol. Stop herbal supplements 1 week prior to  surgery. 8. If you are currently taking Plavix, Coumadin,or any other blood-thinning/ anticoagulant medication contact your surgeon for instructions. 9. Wear comfortable clothes. Wear your glasses instead of contacts. Please leave all money, jewelry and valuables at home. No make up, particularly mascara, the day of surgery. 10.  REMOVE ALL body piercings, rings,and jewelry and leave at home.   Wear your hair loose or down, no pony-tails, buns, or any metal hair clips. 11. If you shower the morning of surgery, please do not apply any lotions, powders, or deodorants afterwards. Do not shave any body area within 24 hours of your surgery. 12. Please follow all instructions to avoid any potential surgical cancellation. 13. Should your physical condition change, (i.e. fever, cold, flu, etc.) please notify your surgeon as soon as possible. 14. It is important to be on time. If a situation occurs where you may be delayed, please call:  [phone] / [phone] on the day of surgery. 15. The Preadmission Testing staff can be reached at 21 [phone]. 16. Special instructions: Bowel prep as directed by Dr. Dave Reed  17. Free  parking 7-5  18. The patient was contacted  in person. She  verbalize  understanding of all instructions does not  need reinforcement.

## 2018-02-27 LAB
ATRIAL RATE: 56 BPM
CALCULATED P AXIS, ECG09: 66 DEGREES
CALCULATED R AXIS, ECG10: 27 DEGREES
CALCULATED T AXIS, ECG11: 38 DEGREES
DIAGNOSIS, 93000: NORMAL
P-R INTERVAL, ECG05: 154 MS
Q-T INTERVAL, ECG07: 458 MS
QRS DURATION, ECG06: 88 MS
QTC CALCULATION (BEZET), ECG08: 441 MS
VENTRICULAR RATE, ECG03: 56 BPM

## 2018-03-01 ENCOUNTER — ANESTHESIA EVENT (OUTPATIENT)
Dept: SURGERY | Age: 73
End: 2018-03-01
Payer: MEDICARE

## 2018-03-01 ENCOUNTER — HOSPITAL ENCOUNTER (OUTPATIENT)
Age: 73
Setting detail: OBSERVATION
Discharge: HOME | End: 2018-03-02
Attending: OBSTETRICS & GYNECOLOGY | Admitting: OBSTETRICS & GYNECOLOGY
Payer: MEDICARE

## 2018-03-01 ENCOUNTER — ANESTHESIA (OUTPATIENT)
Dept: SURGERY | Age: 73
End: 2018-03-01
Payer: MEDICARE

## 2018-03-01 DIAGNOSIS — N81.4 UTERINE PROLAPSE: ICD-10-CM

## 2018-03-01 DIAGNOSIS — N81.11 CYSTOCELE, MIDLINE: ICD-10-CM

## 2018-03-01 DIAGNOSIS — N39.3 FEMALE STRESS INCONTINENCE: ICD-10-CM

## 2018-03-01 DIAGNOSIS — N81.10 CYSTOCELE WITH RECTOCELE: Primary | ICD-10-CM

## 2018-03-01 DIAGNOSIS — N81.6 CYSTOCELE WITH RECTOCELE: Primary | ICD-10-CM

## 2018-03-01 DIAGNOSIS — N81.6 RECTOCELE: ICD-10-CM

## 2018-03-01 PROCEDURE — 74011250636 HC RX REV CODE- 250/636

## 2018-03-01 PROCEDURE — 77030008756 HC TU IRR SUC STRY -B: Performed by: OBSTETRICS & GYNECOLOGY

## 2018-03-01 PROCEDURE — 77030035044 HC TRCR ENDOSC VRSPRT BLDLSS COVD -C: Performed by: OBSTETRICS & GYNECOLOGY

## 2018-03-01 PROCEDURE — 77030008520 HC TBNG INSUF HFLO STOR -B: Performed by: OBSTETRICS & GYNECOLOGY

## 2018-03-01 PROCEDURE — 74011250636 HC RX REV CODE- 250/636: Performed by: ANESTHESIOLOGY

## 2018-03-01 PROCEDURE — 77030018832 HC SOL IRR H20 ICUM -A: Performed by: OBSTETRICS & GYNECOLOGY

## 2018-03-01 PROCEDURE — 77030035043 HC TRCR ENDOSC OPTCL BLDLSS COVD -B: Performed by: OBSTETRICS & GYNECOLOGY

## 2018-03-01 PROCEDURE — 77030013079 HC BLNKT BAIR HGGR 3M -A: Performed by: NURSE ANESTHETIST, CERTIFIED REGISTERED

## 2018-03-01 PROCEDURE — 77030020782 HC GWN BAIR PAWS FLX 3M -B

## 2018-03-01 PROCEDURE — 77030011640 HC PAD GRND REM COVD -A: Performed by: OBSTETRICS & GYNECOLOGY

## 2018-03-01 PROCEDURE — 77030026438 HC STYL ET INTUB CARD -A: Performed by: NURSE ANESTHETIST, CERTIFIED REGISTERED

## 2018-03-01 PROCEDURE — 77030035029 HC NDL INSUF VERES DISP COVD -B: Performed by: OBSTETRICS & GYNECOLOGY

## 2018-03-01 PROCEDURE — C1781 MESH (IMPLANTABLE): HCPCS | Performed by: OBSTETRICS & GYNECOLOGY

## 2018-03-01 PROCEDURE — 88305 TISSUE EXAM BY PATHOLOGIST: CPT | Performed by: OBSTETRICS & GYNECOLOGY

## 2018-03-01 PROCEDURE — 77030008684 HC TU ET CUF COVD -B: Performed by: NURSE ANESTHETIST, CERTIFIED REGISTERED

## 2018-03-01 PROCEDURE — 74011000250 HC RX REV CODE- 250

## 2018-03-01 PROCEDURE — 77030016151 HC PROTCTR LNS DFOG COVD -B: Performed by: OBSTETRICS & GYNECOLOGY

## 2018-03-01 PROCEDURE — 76010000879 HC OR TIME 3.5 TO 4HR INTENSV - TIER 2: Performed by: OBSTETRICS & GYNECOLOGY

## 2018-03-01 PROCEDURE — 77030019927 HC TBNG IRR CYSTO BAXT -A: Performed by: OBSTETRICS & GYNECOLOGY

## 2018-03-01 PROCEDURE — 74011000258 HC RX REV CODE- 258: Performed by: OBSTETRICS & GYNECOLOGY

## 2018-03-01 PROCEDURE — 74011000250 HC RX REV CODE- 250: Performed by: ANESTHESIOLOGY

## 2018-03-01 PROCEDURE — 99218 HC RM OBSERVATION: CPT

## 2018-03-01 PROCEDURE — 77030019908 HC STETH ESOPH SIMS -A: Performed by: NURSE ANESTHETIST, CERTIFIED REGISTERED

## 2018-03-01 PROCEDURE — 77030018673: Performed by: OBSTETRICS & GYNECOLOGY

## 2018-03-01 PROCEDURE — 77010033678 HC OXYGEN DAILY

## 2018-03-01 PROCEDURE — 76210000002 HC OR PH I REC 3 TO 3.5 HR: Performed by: OBSTETRICS & GYNECOLOGY

## 2018-03-01 PROCEDURE — 77030035277 HC OBTRTR BLDELSS DISP INTU -B: Performed by: OBSTETRICS & GYNECOLOGY

## 2018-03-01 PROCEDURE — C1782 MORCELLATOR: HCPCS | Performed by: OBSTETRICS & GYNECOLOGY

## 2018-03-01 PROCEDURE — 74011000250 HC RX REV CODE- 250: Performed by: OBSTETRICS & GYNECOLOGY

## 2018-03-01 PROCEDURE — 77030018836 HC SOL IRR NACL ICUM -A: Performed by: OBSTETRICS & GYNECOLOGY

## 2018-03-01 PROCEDURE — C1771 REP DEV, URINARY, W/SLING: HCPCS | Performed by: OBSTETRICS & GYNECOLOGY

## 2018-03-01 PROCEDURE — 77030027138 HC INCENT SPIROMETER -A

## 2018-03-01 PROCEDURE — 74011250636 HC RX REV CODE- 250/636: Performed by: OBSTETRICS & GYNECOLOGY

## 2018-03-01 PROCEDURE — 77030034850: Performed by: OBSTETRICS & GYNECOLOGY

## 2018-03-01 PROCEDURE — 77030002933 HC SUT MCRYL J&J -A: Performed by: OBSTETRICS & GYNECOLOGY

## 2018-03-01 PROCEDURE — 77030031139 HC SUT VCRL2 J&J -A: Performed by: OBSTETRICS & GYNECOLOGY

## 2018-03-01 PROCEDURE — 77030008606 HC TRCR ENDOSC KII AMR -B: Performed by: OBSTETRICS & GYNECOLOGY

## 2018-03-01 PROCEDURE — 77030008771 HC TU NG SALEM SUMP -A: Performed by: NURSE ANESTHETIST, CERTIFIED REGISTERED

## 2018-03-01 PROCEDURE — 76060000038 HC ANESTHESIA 3.5 TO 4 HR: Performed by: OBSTETRICS & GYNECOLOGY

## 2018-03-01 PROCEDURE — 77030032490 HC SLV COMPR SCD KNE COVD -B

## 2018-03-01 DEVICE — MESH SURG W10XL15CM GYN NONABSORBABLE PROL: Type: IMPLANTABLE DEVICE | Site: PELVIS | Status: FUNCTIONAL

## 2018-03-01 DEVICE — SLING GYN TRANSOBTURATOR APPRCH GYNECARE TVT ABBREVO: Type: IMPLANTABLE DEVICE | Site: VAGINA | Status: FUNCTIONAL

## 2018-03-01 RX ORDER — KETOROLAC TROMETHAMINE 30 MG/ML
INJECTION, SOLUTION INTRAMUSCULAR; INTRAVENOUS AS NEEDED
Status: DISCONTINUED | OUTPATIENT
Start: 2018-03-01 | End: 2018-03-01 | Stop reason: HOSPADM

## 2018-03-01 RX ORDER — NALOXONE HYDROCHLORIDE 0.4 MG/ML
0.4 INJECTION, SOLUTION INTRAMUSCULAR; INTRAVENOUS; SUBCUTANEOUS AS NEEDED
Status: DISCONTINUED | OUTPATIENT
Start: 2018-03-01 | End: 2018-03-02 | Stop reason: HOSPADM

## 2018-03-01 RX ORDER — FENTANYL CITRATE 50 UG/ML
INJECTION, SOLUTION INTRAMUSCULAR; INTRAVENOUS AS NEEDED
Status: DISCONTINUED | OUTPATIENT
Start: 2018-03-01 | End: 2018-03-01 | Stop reason: HOSPADM

## 2018-03-01 RX ORDER — SODIUM CHLORIDE 0.9 % (FLUSH) 0.9 %
5-10 SYRINGE (ML) INJECTION AS NEEDED
Status: DISCONTINUED | OUTPATIENT
Start: 2018-03-01 | End: 2018-03-01 | Stop reason: HOSPADM

## 2018-03-01 RX ORDER — DEXAMETHASONE SODIUM PHOSPHATE 4 MG/ML
INJECTION, SOLUTION INTRA-ARTICULAR; INTRALESIONAL; INTRAMUSCULAR; INTRAVENOUS; SOFT TISSUE AS NEEDED
Status: DISCONTINUED | OUTPATIENT
Start: 2018-03-01 | End: 2018-03-01 | Stop reason: HOSPADM

## 2018-03-01 RX ORDER — ONDANSETRON 4 MG/1
8 TABLET, ORALLY DISINTEGRATING ORAL
Status: DISCONTINUED | OUTPATIENT
Start: 2018-03-01 | End: 2018-03-02 | Stop reason: HOSPADM

## 2018-03-01 RX ORDER — SODIUM CHLORIDE 0.9 % (FLUSH) 0.9 %
5-10 SYRINGE (ML) INJECTION EVERY 8 HOURS
Status: DISCONTINUED | OUTPATIENT
Start: 2018-03-01 | End: 2018-03-02 | Stop reason: HOSPADM

## 2018-03-01 RX ORDER — DIPHENHYDRAMINE HYDROCHLORIDE 50 MG/ML
12.5 INJECTION, SOLUTION INTRAMUSCULAR; INTRAVENOUS
Status: DISCONTINUED | OUTPATIENT
Start: 2018-03-01 | End: 2018-03-02 | Stop reason: HOSPADM

## 2018-03-01 RX ORDER — SODIUM CHLORIDE, SODIUM LACTATE, POTASSIUM CHLORIDE, CALCIUM CHLORIDE 600; 310; 30; 20 MG/100ML; MG/100ML; MG/100ML; MG/100ML
125 INJECTION, SOLUTION INTRAVENOUS CONTINUOUS
Status: DISPENSED | OUTPATIENT
Start: 2018-03-01 | End: 2018-03-02

## 2018-03-01 RX ORDER — BISACODYL 5 MG
5 TABLET, DELAYED RELEASE (ENTERIC COATED) ORAL DAILY PRN
Status: DISCONTINUED | OUTPATIENT
Start: 2018-03-01 | End: 2018-03-02 | Stop reason: HOSPADM

## 2018-03-01 RX ORDER — GLYCOPYRROLATE 0.2 MG/ML
INJECTION INTRAMUSCULAR; INTRAVENOUS AS NEEDED
Status: DISCONTINUED | OUTPATIENT
Start: 2018-03-01 | End: 2018-03-01 | Stop reason: HOSPADM

## 2018-03-01 RX ORDER — SODIUM CHLORIDE 0.9 % (FLUSH) 0.9 %
5-10 SYRINGE (ML) INJECTION EVERY 8 HOURS
Status: DISCONTINUED | OUTPATIENT
Start: 2018-03-01 | End: 2018-03-01 | Stop reason: HOSPADM

## 2018-03-01 RX ORDER — LIDOCAINE HYDROCHLORIDE 20 MG/ML
INJECTION, SOLUTION EPIDURAL; INFILTRATION; INTRACAUDAL; PERINEURAL AS NEEDED
Status: DISCONTINUED | OUTPATIENT
Start: 2018-03-01 | End: 2018-03-01 | Stop reason: HOSPADM

## 2018-03-01 RX ORDER — ROCURONIUM BROMIDE 10 MG/ML
INJECTION, SOLUTION INTRAVENOUS AS NEEDED
Status: DISCONTINUED | OUTPATIENT
Start: 2018-03-01 | End: 2018-03-01 | Stop reason: HOSPADM

## 2018-03-01 RX ORDER — HYDROMORPHONE HYDROCHLORIDE 1 MG/ML
1 INJECTION, SOLUTION INTRAMUSCULAR; INTRAVENOUS; SUBCUTANEOUS
Status: DISCONTINUED | OUTPATIENT
Start: 2018-03-01 | End: 2018-03-02 | Stop reason: HOSPADM

## 2018-03-01 RX ORDER — SODIUM CHLORIDE, SODIUM LACTATE, POTASSIUM CHLORIDE, CALCIUM CHLORIDE 600; 310; 30; 20 MG/100ML; MG/100ML; MG/100ML; MG/100ML
100 INJECTION, SOLUTION INTRAVENOUS CONTINUOUS
Status: DISCONTINUED | OUTPATIENT
Start: 2018-03-01 | End: 2018-03-01 | Stop reason: HOSPADM

## 2018-03-01 RX ORDER — HYDROCODONE BITARTRATE AND ACETAMINOPHEN 10; 325 MG/1; MG/1
1 TABLET ORAL
Status: DISCONTINUED | OUTPATIENT
Start: 2018-03-01 | End: 2018-03-02 | Stop reason: HOSPADM

## 2018-03-01 RX ORDER — SUCCINYLCHOLINE CHLORIDE 20 MG/ML
INJECTION INTRAMUSCULAR; INTRAVENOUS AS NEEDED
Status: DISCONTINUED | OUTPATIENT
Start: 2018-03-01 | End: 2018-03-01 | Stop reason: HOSPADM

## 2018-03-01 RX ORDER — CEFAZOLIN SODIUM/WATER 2 G/20 ML
2 SYRINGE (ML) INTRAVENOUS
Status: DISCONTINUED | OUTPATIENT
Start: 2018-03-01 | End: 2018-03-01

## 2018-03-01 RX ORDER — NEOSTIGMINE METHYLSULFATE 1 MG/ML
INJECTION INTRAVENOUS AS NEEDED
Status: DISCONTINUED | OUTPATIENT
Start: 2018-03-01 | End: 2018-03-01 | Stop reason: HOSPADM

## 2018-03-01 RX ORDER — HYDROMORPHONE HYDROCHLORIDE 2 MG/ML
INJECTION, SOLUTION INTRAMUSCULAR; INTRAVENOUS; SUBCUTANEOUS AS NEEDED
Status: DISCONTINUED | OUTPATIENT
Start: 2018-03-01 | End: 2018-03-01 | Stop reason: HOSPADM

## 2018-03-01 RX ORDER — SODIUM CHLORIDE 0.9 % (FLUSH) 0.9 %
5-10 SYRINGE (ML) INJECTION AS NEEDED
Status: DISCONTINUED | OUTPATIENT
Start: 2018-03-01 | End: 2018-03-02 | Stop reason: HOSPADM

## 2018-03-01 RX ORDER — MIDAZOLAM HYDROCHLORIDE 1 MG/ML
INJECTION, SOLUTION INTRAMUSCULAR; INTRAVENOUS AS NEEDED
Status: DISCONTINUED | OUTPATIENT
Start: 2018-03-01 | End: 2018-03-01 | Stop reason: HOSPADM

## 2018-03-01 RX ORDER — EPHEDRINE SULFATE 50 MG/ML
INJECTION, SOLUTION INTRAVENOUS AS NEEDED
Status: DISCONTINUED | OUTPATIENT
Start: 2018-03-01 | End: 2018-03-01 | Stop reason: HOSPADM

## 2018-03-01 RX ORDER — HYDROMORPHONE HYDROCHLORIDE 1 MG/ML
.25-1 INJECTION, SOLUTION INTRAMUSCULAR; INTRAVENOUS; SUBCUTANEOUS
Status: DISCONTINUED | OUTPATIENT
Start: 2018-03-01 | End: 2018-03-01 | Stop reason: HOSPADM

## 2018-03-01 RX ORDER — ONDANSETRON 2 MG/ML
INJECTION INTRAMUSCULAR; INTRAVENOUS AS NEEDED
Status: DISCONTINUED | OUTPATIENT
Start: 2018-03-01 | End: 2018-03-01 | Stop reason: HOSPADM

## 2018-03-01 RX ORDER — PROPOFOL 10 MG/ML
INJECTION, EMULSION INTRAVENOUS AS NEEDED
Status: DISCONTINUED | OUTPATIENT
Start: 2018-03-01 | End: 2018-03-01 | Stop reason: HOSPADM

## 2018-03-01 RX ORDER — LEVOTHYROXINE SODIUM 112 UG/1
112 TABLET ORAL
Status: DISCONTINUED | OUTPATIENT
Start: 2018-03-02 | End: 2018-03-02 | Stop reason: HOSPADM

## 2018-03-01 RX ORDER — SODIUM CHLORIDE, SODIUM LACTATE, POTASSIUM CHLORIDE, CALCIUM CHLORIDE 600; 310; 30; 20 MG/100ML; MG/100ML; MG/100ML; MG/100ML
INJECTION, SOLUTION INTRAVENOUS
Status: DISCONTINUED | OUTPATIENT
Start: 2018-03-01 | End: 2018-03-01 | Stop reason: HOSPADM

## 2018-03-01 RX ORDER — CEFAZOLIN SODIUM 1 G/3ML
INJECTION, POWDER, FOR SOLUTION INTRAMUSCULAR; INTRAVENOUS AS NEEDED
Status: DISCONTINUED | OUTPATIENT
Start: 2018-03-01 | End: 2018-03-01 | Stop reason: HOSPADM

## 2018-03-01 RX ORDER — LIDOCAINE HYDROCHLORIDE 10 MG/ML
0.1 INJECTION, SOLUTION EPIDURAL; INFILTRATION; INTRACAUDAL; PERINEURAL AS NEEDED
Status: DISCONTINUED | OUTPATIENT
Start: 2018-03-01 | End: 2018-03-01 | Stop reason: HOSPADM

## 2018-03-01 RX ORDER — KETOROLAC TROMETHAMINE 30 MG/ML
30 INJECTION, SOLUTION INTRAMUSCULAR; INTRAVENOUS
Status: DISCONTINUED | OUTPATIENT
Start: 2018-03-01 | End: 2018-03-02 | Stop reason: HOSPADM

## 2018-03-01 RX ORDER — ALBUTEROL SULFATE 0.83 MG/ML
2.5 SOLUTION RESPIRATORY (INHALATION)
Status: DISCONTINUED | OUTPATIENT
Start: 2018-03-01 | End: 2018-03-02 | Stop reason: HOSPADM

## 2018-03-01 RX ORDER — LOSARTAN POTASSIUM 50 MG/1
50 TABLET ORAL DAILY
Status: DISCONTINUED | OUTPATIENT
Start: 2018-03-02 | End: 2018-03-02 | Stop reason: HOSPADM

## 2018-03-01 RX ADMIN — SODIUM CHLORIDE, POTASSIUM CHLORIDE, SODIUM LACTATE AND CALCIUM CHLORIDE 100 ML/HR: 600; 310; 30; 20 INJECTION, SOLUTION INTRAVENOUS at 11:43

## 2018-03-01 RX ADMIN — SODIUM CHLORIDE, SODIUM LACTATE, POTASSIUM CHLORIDE, CALCIUM CHLORIDE: 600; 310; 30; 20 INJECTION, SOLUTION INTRAVENOUS at 07:50

## 2018-03-01 RX ADMIN — ROCURONIUM BROMIDE 10 MG: 10 INJECTION, SOLUTION INTRAVENOUS at 07:33

## 2018-03-01 RX ADMIN — LIDOCAINE HYDROCHLORIDE 100 MG: 20 INJECTION, SOLUTION EPIDURAL; INFILTRATION; INTRACAUDAL; PERINEURAL at 07:30

## 2018-03-01 RX ADMIN — FENTANYL CITRATE 50 MCG: 50 INJECTION, SOLUTION INTRAMUSCULAR; INTRAVENOUS at 07:30

## 2018-03-01 RX ADMIN — SODIUM CHLORIDE 6.25 MG: 9 INJECTION INTRAMUSCULAR; INTRAVENOUS; SUBCUTANEOUS at 12:22

## 2018-03-01 RX ADMIN — ROCURONIUM BROMIDE 20 MG: 10 INJECTION, SOLUTION INTRAVENOUS at 08:46

## 2018-03-01 RX ADMIN — ROCURONIUM BROMIDE 10 MG: 10 INJECTION, SOLUTION INTRAVENOUS at 09:41

## 2018-03-01 RX ADMIN — HYDROMORPHONE HYDROCHLORIDE 0.5 MG: 2 INJECTION, SOLUTION INTRAMUSCULAR; INTRAVENOUS; SUBCUTANEOUS at 10:45

## 2018-03-01 RX ADMIN — ONDANSETRON 4 MG: 2 INJECTION INTRAMUSCULAR; INTRAVENOUS at 10:26

## 2018-03-01 RX ADMIN — DEXAMETHASONE SODIUM PHOSPHATE 8 MG: 4 INJECTION, SOLUTION INTRA-ARTICULAR; INTRALESIONAL; INTRAMUSCULAR; INTRAVENOUS; SOFT TISSUE at 07:51

## 2018-03-01 RX ADMIN — FENTANYL CITRATE 50 MCG: 50 INJECTION, SOLUTION INTRAMUSCULAR; INTRAVENOUS at 08:03

## 2018-03-01 RX ADMIN — KETOROLAC TROMETHAMINE 30 MG: 30 INJECTION, SOLUTION INTRAMUSCULAR; INTRAVENOUS at 11:00

## 2018-03-01 RX ADMIN — GLYCOPYRROLATE 0.4 MG: 0.2 INJECTION INTRAMUSCULAR; INTRAVENOUS at 10:45

## 2018-03-01 RX ADMIN — EPHEDRINE SULFATE 10 MG: 50 INJECTION, SOLUTION INTRAVENOUS at 07:56

## 2018-03-01 RX ADMIN — ROCURONIUM BROMIDE 40 MG: 10 INJECTION, SOLUTION INTRAVENOUS at 07:45

## 2018-03-01 RX ADMIN — FENTANYL CITRATE 50 MCG: 50 INJECTION, SOLUTION INTRAMUSCULAR; INTRAVENOUS at 08:54

## 2018-03-01 RX ADMIN — PROPOFOL 120 MG: 10 INJECTION, EMULSION INTRAVENOUS at 07:33

## 2018-03-01 RX ADMIN — MIDAZOLAM HYDROCHLORIDE 2 MG: 1 INJECTION, SOLUTION INTRAMUSCULAR; INTRAVENOUS at 07:30

## 2018-03-01 RX ADMIN — SODIUM CHLORIDE, SODIUM LACTATE, POTASSIUM CHLORIDE, AND CALCIUM CHLORIDE 100 ML/HR: 600; 310; 30; 20 INJECTION, SOLUTION INTRAVENOUS at 07:25

## 2018-03-01 RX ADMIN — SODIUM CHLORIDE, SODIUM LACTATE, POTASSIUM CHLORIDE, AND CALCIUM CHLORIDE 125 ML/HR: 600; 310; 30; 20 INJECTION, SOLUTION INTRAVENOUS at 17:25

## 2018-03-01 RX ADMIN — HYDROMORPHONE HYDROCHLORIDE 0.5 MG: 2 INJECTION, SOLUTION INTRAMUSCULAR; INTRAVENOUS; SUBCUTANEOUS at 10:47

## 2018-03-01 RX ADMIN — Medication 10 ML: at 17:26

## 2018-03-01 RX ADMIN — SUCCINYLCHOLINE CHLORIDE 100 MG: 20 INJECTION INTRAMUSCULAR; INTRAVENOUS at 07:33

## 2018-03-01 RX ADMIN — NEOSTIGMINE METHYLSULFATE 3 MG: 1 INJECTION INTRAVENOUS at 10:45

## 2018-03-01 RX ADMIN — FENTANYL CITRATE 100 MCG: 50 INJECTION, SOLUTION INTRAMUSCULAR; INTRAVENOUS at 07:33

## 2018-03-01 RX ADMIN — CEFAZOLIN SODIUM 2 G: 1 INJECTION, POWDER, FOR SOLUTION INTRAMUSCULAR; INTRAVENOUS at 07:50

## 2018-03-01 RX ADMIN — HYDROMORPHONE HYDROCHLORIDE 0.25 MG: 1 INJECTION, SOLUTION INTRAMUSCULAR; INTRAVENOUS; SUBCUTANEOUS at 12:25

## 2018-03-01 NOTE — PROGRESS NOTES
1446: Primary Nurse Jose Godinez and Ry Sanders RN performed a dual skin assessment on this patient No impairment noted--Jag score is 20    Bedside and Verbal shift change report given to Yas Mann (oncoming nurse) by [facility] (offgoing nurse). Report included the following information SBAR, Kardex, Procedure Summary, Intake/Output, MAR and Recent Results.

## 2018-03-01 NOTE — ANESTHESIA PREPROCEDURE EVALUATION
Anesthetic History   No history of anesthetic complications            Review of Systems / Medical History  Patient summary reviewed and pertinent labs reviewed    Pulmonary            Asthma : well controlled       Neuro/Psych   Within defined limits           Cardiovascular    Hypertension              Exercise tolerance: >4 METS     GI/Hepatic/Renal  Within defined limits              Endo/Other      Hypothyroidism: well controlled       Other Findings              Physical Exam    Airway  Mallampati: II  TM Distance: 4 - 6 cm  Neck ROM: normal range of motion   Mouth opening: Normal     Cardiovascular  Regular rate and rhythm,  S1 and S2 normal,  no murmur, click, rub, or gallop  Rhythm: regular  Rate: normal         Dental  No notable dental hx       Pulmonary  Breath sounds clear to auscultation               Abdominal  GI exam deferred       Other Findings            Anesthetic Plan    ASA: 2  Anesthesia type: general          Induction: Intravenous  Anesthetic plan and risks discussed with: Patient

## 2018-03-01 NOTE — OP NOTES
Operative Summary: daVinci LSH/BSO/Sacrocolpopexy/TVT-O/cystoscopy      Name: Tati Silver   Medical Record Number: [id number]   YOB: 1945  Date of Surgery: 3/1/2018    Preoperative Diagnosis: CYSTOCELE, RECTOCELE, UTERINE PROLAPSE    Postoperative Diagnosis: CYSTOCELE, RECTOCELE, UTERINE PROLAPSE, DENSE MULTIPLE ADHESIONS    Surgeon:  Jeanette Nova MD     Assistant:  staff    Anesthesia: General    Procedure: Procedure(s):  DAVINCI LAPAROSCOPIC SUPRACERVICAL HYSTERECTOMY, BILATERAL SALPINGO OOPHORECTOMY, SACROCOLPOPEXY  TENSION FREE VAGINAL TAPING OBTURATOR, CYSTOSCOPY     Findings: see below    Estimated Blood Loss:  less than 100 cc    Drains: none    Pathology /Specimens:    ID Type Source Tests Collected by Time Destination   1 : [address] Abdomen  Jeanette Nova MD 3/1/2018 1038 Pathology       DVT Prophylaxis: SCD Hose    Antibiotic Prophylaxis: Ancef:      INDICATIONS:    Informed consent was obtained for the above procedure, and the patient stated that she had no further questions. OPERATIVE SUMMARY:   The patient was prepped and draped for daVinci surgery. The ayoub catheter was placed in a sterile fashion. The pelvis was examined and previous findings confirmed. Attention was turned to the remainder of the procedure. The patient had a large midline scar extending from almost the xiphoid process to below the umbilicus from her distant past colectomy. So the first incision was in the left upper quadrant. The veress needle was introduced and pneumoperitoneum obtained. A daVinci 8 mm trocar was introduced with direct view. It was safely introduced but was in the middle of a large area of omental adhesions. An opening was found to the right upper quadrant which was freer of adhesions. Two ports were placed there, a daVinci 8 in the right upper quadrant and an assistant 8 mm port lateral to that.   With those two ports, using a 5 mm camera with sharp, blunt dissection and some monopolar scissors all the adhesions were taken down. There was an area toward the left with bowel but this was taken down with no sign of injury to the bowel. There were a number of bleeding points that were addressed on the omentum as the dissection proceeded. The dissection of these adhesions took more than an hour to accomplish. A supraumbilical incision was made about 6 cm above the umbilicus. A daVinci 8 mm port was used at that site. The DaVinci camera was used to visualize the subsequent port site insertions. Two more daVinci ports were placed; one in the left lateral quadrant, one in the left upper quadrant (replacing the initial incision, more lateral to address arm collision issues. These were all placed without difficulty and without trauma. The DaVinci bedside cart was docked, and all instruments were inserted with direct visualization. The hot shear was placed on the right, fenestrated bipolar in the left upper quadrant, and then caudiet retractor in the lateral left. The surgeon went to the console. The pelvis was visualized. The uterus, tubes and ovaries were all normal and free of disease, other than prolapse. The bipolar cautery and hot shear was used to take down the right infundibulopelvic ligament, the right round ligament and the right broad ligament. The uterine vessels were exposed and cauterized. An identical procedure was accomplished on the left. The cervix uterine junction was identified and the uterus divided from the cervix without difficulty. The uterus with attached tubes and ovaries was placed aside for later morcellation and removal.  Dissection was accomplished on the anterior vagina with the manipulator in the vagina for countertraction. Dissection was then similarly accomplished posteriorly. An incision was made over the sacral promontory.  Dissection was then undertaken from the sacral promontory and using hydrodissection with a window created at the cul-de-sac for later tunneling with the mesh. Sacral promontory was identified, and dissected, and the incision carried down to the presacral ligament without difficulty. The mesh was placed in the abdominal cavity. It was trimmed to fit anteriorly and posteriorly. Then, the mesh was sutured in place on the vaginal cuff and cervix with 3 separate nonabsorbable running Quill sutures-two anterior and one posterior. Excellent approximation was obtained at all surfaces. There was no significant bleeding. The base of the Y of the mesh was then tunneled up to the sacral promontory, placed on tension, and the level of suture appropriate for maintaining the tension was identified. Then, 2 separate [place]-Gil sutures were placed through the mesh and the sacral promontory ligament, resulting in excellent elevation and excellent approximation of the mesh to that site. The extra mesh was trimmed off the distal end of the Y. The entire area was re-peritonealized with a single running absorbable Quill suture. Interceed was placed to cover all the reperitonealized surfaces. The [email or web address] bedside cart was undocked. The 8 mm port at the suprumbilical site was replaced with a 12 to introduce the nylon endobag into the abdomen. The uterus was placed in the bag. The opening of the bag was brought up through the incision at the umbilicus. The 12 mm port was introduced and the bag was inflated. The scope was placed into the 12 mm port and used to visualize the laparoscopic scissor in the right upper quadrant 5 mm balloon port and make a puncture at the opening for the 5 mm trocar to enter the bag. It was introduced into the bag and the balloon inflated. The scope was moved to that port to visualize the bag contents    The 12 port was removed, the morcellator introduced, and the uterus and tubes were morcellated in the bag and removed through the umbilicus.   All tissue fragments were identified and removed and there was no spillage of contents into the abdominal cavity at any point. The suction unit was used to irrigate the internal surfaces of the bag and remove blood and clots. The bag was then removed intact with some blood and small tissue fragments after deflating the 5 mm port balloon. The tissue was sent to pathology as uterus, tubes, and ovaries. The laparoscopy was then discontinued. With direct visualization, the midline port above the umbilicus was closed at the fascia level with 2-0 Vicryl. All 5 incisions were closed with subcuticular 4-0 Monocryl. Attention was turned to the TVT. The previously placed ayoub was used to identify the mid-urethra. Two allis clamps were placed. A 2 cm vertical incision was made, and dissection accomplished out to the obturator membrane. It was not punctured. The winged guide was placed and then the TVT-O device was placed on each side using the helical needle passers without difficulty. The urethra was elevated to its normal position and then the slack taken out of the TVT-ABBREVO device. It was then held in place by removing the sleeves and the sutures on each side. The vaginal epithelium was closed with a running mattress suture of 2-0 Vicryl. The Ayoub catheter was removed, the bladder was inspected with cystoscopy, and all surfaces appeared to be completely normal. There was prompt spill from both ureteral orifices. There were no signs of injury to the bladder. The scope was removed and the Ayoub catheter replaced. The patient was awakened and taken to the recovery room in good condition.       Signed By:  Norah Hillman MD     March 1, 2018

## 2018-03-01 NOTE — PERIOP NOTES
DR Cecy Tolbert informed  Of post  Op  occassional runs of trigeminy, patient asymptomatic through out, converted back to NSR,  Patient is alert and non complaining, resting well.

## 2018-03-01 NOTE — IP AVS SNAPSHOT
[address] 
[phone] Patient: Joseph Marquis MRN: [id number] :1945 A check tonio indicates which time of day the medication should be taken. My Medications START taking these medications Instructions Each Dose to Equal  
 Morning Noon Evening Bedtime  
 traMADol 50 mg tablet Commonly known as:  ULTRAM  
Your next dose is: Take as needed Notes to Patient:  You did not have this medication during your hospitalizaton Take 1 Tab by mouth every six (6) hours as needed for Pain. Max Daily Amount: 200 mg.  
 50 mg CONTINUE taking these medications Instructions Each Dose to Equal  
 Morning Noon Evening Bedtime  
 albuterol 90 mcg/actuation inhaler Commonly known as:  PROVENTIL HFA, VENTOLIN HFA, PROAIR HFA Your next dose is: Take as needed Notes to Patient:  You did not have this medication during your hospitalizaton Take 2 Puffs by inhalation every four (4) hours as needed for Wheezing or Shortness of Breath. 2 Puff  
    
   
   
   
  
 aspirin delayed-release 81 mg tablet Your next dose is: Take as directed Notes to Patient: This medication was not ordered during your hospitalization Take  by mouth daily. calcium-cholecalciferol (d3) 600-125 mg-unit Tab Your next dose is: Take as directed Notes to Patient:  You did not have this medication during your hospitalizaton Take  by mouth. CENTRUM SILVER PO Your next dose is: Take as directed Notes to Patient:  You did not have this medication during your hospitalizaton Take  by mouth.  
     
   
   
   
  
 levothyroxine 112 mcg tablet Commonly known as:  SYNTHROID Your last dose was:   This morning at 6:30am  
Your next dose is:  Tomorrow 3/2/18 at 7:30am  
   
 TAKE 1 TABLET BY MOUTH DAILY BEFORE BREAKFAST FOR HYPOTHYROIDISM  Indications: hypothyroidism  
     
   
   
   
  
 losartan 50 mg tablet Commonly known as:  COZAAR Your last dose was: This am, Friday 3/2 Your next dose is:  Tomorrow 3/2/18 at 9am  
   
 Take 1 Tab by mouth daily. 50 mg  
    
   
   
   
  
 polyethylene glycol 17 gram/dose powder Commonly known as:  Lindalou Meme Your last dose was: Take as directed Notes to Patient:  You did not have this medication during your hospitalizaton TK 17 GRAMS DISSOLVED IN WATER ONCE A DAY Where to Get Your Medications Information on where to get these meds will be given to you by the nurse or doctor. ! Ask your nurse or doctor about these medications  
  traMADol 50 mg tablet

## 2018-03-01 NOTE — PERIOP NOTES
Pt fall protocol  Yellow arm band on patient, yellow non skid socks on   bed in low position, all side rails up, call bell in reach  Pt  & family instructed in \"call don't fall\" protocol     Use your call bell,and wait for assistance, staff not family will assist you to get up &   move about  Pt & family verbalize understanding of fall precautions and \"call don't fall\" protocol    Son back to see pt

## 2018-03-01 NOTE — H&P
Gynecology History and Physical    Name: Randal Raymundo MRN: [id number] SSN: [id number]    YOB: 1945  Age: 81 y.o. Sex: female       Subjective:      Chief complaint:  Prolapse    Laura is a 81 y.o.  female with a history of pelvic prolapse including severe cystocoele, uterine prolapse and rectocoele without stress urinary incontinence. Previous evaluation has been done with urodynamics. Previous treatment has consisted of none. She is admitted for Procedure(s) (LRB):  DAVINCI LAPAROSCOPIC SUPRACERVICAL HYSTERECTOMY, BILATERAL SALPINGO OOPHORECTOMY, SACROCOLPOPEXY, TENSION FREE VAGINAL TAPING OBTURATOR, CYSTOSCOPY (REQUEST XI) (N/A)  URETHROPEXY SLING TAPING (N/A). The current method of family planning is post menopausal status. OB History     No data available        Past Medical History:   Diagnosis Date    Asthma     Cataract     Hypertension     Joint pain     feet and hips     Precancerous lesion 2003    Colon, resection     Thyroid disease     hypothyroid     Past Surgical History:   Procedure Laterality Date    COLONOSCOPY N/A 6/20/2016    COLONOSCOPY performed by Leticia Diaz MD at [facility] HX BREAST BIOPSY Bilateral     negative    HX CATARACT REMOVAL      HX COLECTOMY  2003    partial, appr 6\" according to patient    HX COLONOSCOPY      partial colon removed   colonscopy q 5 years     HX DILATION AND CURETTAGE      X2 after miscarriages    HX TONSIL AND ADENOIDECTOMY      HX UROLOGICAL  01/12/2018    Urodynamics     Social History     Occupational History    Not on file.      Social History Main Topics    Smoking status: Former Smoker     Quit date: 2/26/1969    Smokeless tobacco: Never Used    Alcohol use Yes      Comment: very occasionally, social-none in past several weeks    Drug use: No    Sexual activity: No     Family History   Problem Relation Age of Onset    Cancer Mother      cervical cancer    Stroke Mother      hemorhagic    Diabetes Father     Hypertension Father         No Known Allergies  Prior to Admission medications    Medication Sig Start Date End Date Taking? Authorizing Provider   levothyroxine (SYNTHROID) 112 mcg tablet TAKE 1 TABLET BY MOUTH DAILY BEFORE BREAKFAST FOR HYPOTHYROIDISM  Indications: hypothyroidism 12/17/17  Yes Kirill Alexandra MD   losartan (COZAAR) 50 mg tablet Take 1 Tab by mouth daily. 3/23/17  Yes Kirill Alexandra MD   albuterol (PROVENTIL HFA, VENTOLIN HFA, PROAIR HFA) 90 mcg/actuation inhaler Take 2 Puffs by inhalation every four (4) hours as needed for Wheezing or Shortness of Breath. 3/23/17  Yes Kirill Alexandra MD   polyethylene glycol (MIRALAX) 17 gram/dose powder TK 17 GRAMS DISSOLVED IN WATER ONCE A DAY 3/4/17   Historical Provider   aspirin delayed-release 81 mg tablet Take  by mouth daily. Historical Provider   calcium-cholecalciferol, d3, [id number] mg-unit tab Take  by mouth. Historical Provider        Review of Systems:  A comprehensive review of systems was negative except for that written in the History of Present Illness. Objective:     Vitals:    03/01/18 0619   BP: 126/47   Pulse: 67   Resp: 12   Temp: 97.7 °F (36.5 °C)   SpO2: 100%   Weight: 141 lb 8.6 oz (64.2 kg)   Height: 5' 2\" (1.575 m)       Physical Exam:  Heart: Regular rate and rhythm  Lung: clear to auscultation throughout lung fields, no wheezes, no rales, no rhonchi and normal respiratory effort  Abdomen: soft, nontender  External Genitalia: normal general appearance  Urinary system: urethral meatus normal  Vagina: cystocele present, severe, rectocele present, moderate and uterine prolapse, severe  Cervix: normal appearance  Adnexa: normal bimanual exam  Uterus: normal single, nontender    Assessment:     Pelvic prolapse, cystocoele, rectocoele, uterine prolapse without urinary incontinence.   Prophylactic TVT-O with 400 cc voiding trial.    Plan:     Procedure(s) (LRB):  DAVINCI LAPAROSCOPIC SUPRACERVICAL HYSTERECTOMY, BILATERAL SALPINGO OOPHORECTOMY, SACROCOLPOPEXY, TENSION FREE VAGINAL TAPING OBTURATOR, CYSTOSCOPY (REQUEST XI) (N/A)  URETHROPEXY SLING TAPING (N/A)  Discussed the risks of surgery including the risks of bleeding, infection, deep vein thrombosis, and surgical injuries to internal organs including but not limited to the bowels, bladder, rectum, and female reproductive organs. The patient understands the risks; any and all questions were answered to the patient's satisfaction.     Signed By:  Liss Purdy MD     March 1, 2018

## 2018-03-01 NOTE — IP AVS SNAPSHOT
Ronit Rangel 
 
 
 [address] [facility] [address] [facility] 
[phone] Patient: Ivett Cintron MRN: [id number] :1945 About your hospitalization You were admitted on:  2018 You last received care in the:  [facility] 4M POST SURG ORT 2 You were discharged on:  2018 Why you were hospitalized Your primary diagnosis was:  Not on File Your diagnoses also included:  Cystocele With Rectocele Follow-up Information Follow up With Details Comments Contact Info In 2 weeks MD Carlos Zacarias [address] Internal Medicine [address] 
[phone] Discharge Orders None A check tonio indicates which time of day the medication should be taken. My Medications START taking these medications Instructions Each Dose to Equal  
 Morning Noon Evening Bedtime  
 traMADol 50 mg tablet Commonly known as:  ULTRAM  
Your next dose is: Take as needed Notes to Patient:  You did not have this medication during your hospitalizaton Take 1 Tab by mouth every six (6) hours as needed for Pain. Max Daily Amount: 200 mg.  
 50 mg CONTINUE taking these medications Instructions Each Dose to Equal  
 Morning Noon Evening Bedtime  
 albuterol 90 mcg/actuation inhaler Commonly known as:  PROVENTIL HFA, VENTOLIN HFA, PROAIR HFA Your next dose is: Take as needed Notes to Patient:  You did not have this medication during your hospitalizaton Take 2 Puffs by inhalation every four (4) hours as needed for Wheezing or Shortness of Breath. 2 Puff  
    
   
   
   
  
 aspirin delayed-release 81 mg tablet Your next dose is: Take as directed Notes to Patient: This medication was not ordered during your hospitalization Take  by mouth daily. calcium-cholecalciferol (d3) 600-125 mg-unit Tab Your next dose is: Take as directed Notes to Patient:  You did not have this medication during your hospitalizaton Take  by mouth. CENTRUM SILVER PO Your next dose is: Take as directed Notes to Patient:  You did not have this medication during your hospitalizaton Take  by mouth.  
     
   
   
   
  
 levothyroxine 112 mcg tablet Commonly known as:  SYNTHROID Your last dose was: This morning at 6:30am  
Your next dose is:  Tomorrow 3/2/18 at 7:30am  
   
 TAKE 1 TABLET BY MOUTH DAILY BEFORE BREAKFAST FOR HYPOTHYROIDISM  Indications: hypothyroidism  
     
   
   
   
  
 losartan 50 mg tablet Commonly known as:  COZAAR Your last dose was: This am, Friday 3/2 Your next dose is:  Tomorrow 3/2/18 at 9am  
   
 Take 1 Tab by mouth daily. 50 mg  
    
   
   
   
  
 polyethylene glycol 17 gram/dose powder Commonly known as:  Radu Jose Your last dose was: Take as directed Notes to Patient:  You did not have this medication during your hospitalizaton TK 17 GRAMS DISSOLVED IN WATER ONCE A DAY Where to Get Your Medications Information on where to get these meds will be given to you by the nurse or doctor. ! Ask your nurse or doctor about these medications  
  traMADol 50 mg tablet Discharge Instructions DaVinci Laparoscopic hysterectomy Procedure-specific postoperative instructions General Instructions Make an appointment to come back to the office in about 2-3 weeks. Eat and drink your normal diet. Take laxatives as needed. Call us if you have questions or problems. Incision care With this procedure you will have 5 small incisions. One or more may be more sensitive than the others. Ice packs or heating pad (low settingdon't burn yourself) can be helpful. Treat these incisions like normal skin. You can shower and wash this skin as you normally would. You need to call the office if there is spreading redness around the incision, it feels hot, or has drainage other than just a mild blood-tinged appearance. Supracervical Hysterectomy If the uterus was removed but the cervix was not removed, then you have NO restriction. This may be hard to believe, but you can literally do anything you want to do and no damage will occur. Now, you may not FEEL like doing much. Any major surgery is a stress on your body. So you may be tired, have no energy, may feel weak and listless. But no damage will occur if you lift or do strenuous activity. Listen to your body. If it is telling you to rest, do so. If you feel good, you won't hurt anything by going shopping of doing household activities. Total Hysterectomy If the uterus and cervix were removed, recovery is different. This top of the vagina where the cervix was is now closed with suture. That area has to heal before you do strenuous activities, put anything in the vagina, or lift anything that you feel you have to strain, typically that would be 15 pounds or more. The only thing holding your insides (intestines) in place are the sutures holding the top of the vagina closed, so the last thing you want is for that to fail. Abstaining from sex is mandatory for 6 weeks. Infection at that area is a possible cause of failure. Symptoms of that could be pain getting worse in the pelvic area instead of better, fever, or a change from the normal discharge to having a foul odor. Call us if those things are happening. Antibiotics will usually clear that up quickly. Regarding Ovaries If your ovaries (or one ovary) were left in place you will not need hormones. There may be some fluctuations in hormone levels and resulting temperature or mood issues but these should be mild and temporary. If your ovaries have been removed you will need to use replacement hormones (unless you've had breast cancer or blood clots in lungs or legs in the past). Within 48 hours after removal of ovaries, hormone levels fall and most women will have some degree of menopausal symptoms. These can be very difficult. We use an estrogen patch for at least the first 6 weeks after surgery. This will alleviate nearly all of the symptoms you would have. Dr. Lasha Steve will discuss the long term use of estrogen with you at the postoperative visit. The patch is used postoperatively because it has minimal increase in the risk of blood clots, etc.  If you have any problems with the patch, call the office. daVinci Sacrocolpopexy Procedure-specific postoperative instructions What to expect You have had major surgery through tiny incisions. The most important factor after this surgery to anchor everything back in place is to not tear it loose. That means no significant lifting for 6 weeks. After that healing is complete and you resume all activites. The chances of undoing the surgery by doing anything are very small, but not zero. You can expect some pulling or sharp sensations. It is not likely to be severe and other than lifting or falling you will not damage this repair doing daily activities. As always, if you feel like something is amiss, call the office. TVT Tension free Vaginal Tape Procedure-specific postoperative instructions Bladder care Your bladder may be irritable following this surgery, so you may need to go more frequently or urgently than before. Please be patient, this will usually go away within the first six weeks without treatment. It is possible, however to get a bladder infection after surgery. If it is burning as you urinate, symptoms are getting worse, or you see blood in your urine consistently, call us. Otherwise, it is important that you empty your bladder when you go.   You may need to take extra time or even press gently above your pubic bone as you urinate to help your bladder empty. What to expect You have had minor surgery through a tiny incision in the front of the vagina. The most important factor after this surgery to anchor the bladder neck back in place is to not tear it loose. That means no significant lifting for 6 weeks. After that healing is complete and you can resume normal activity. The chances of undoing the surgery are very small, but not zero. You can expect some pulling or sharp sensations. It is not likely to be severe and other than lifting heavy objects or falling you will not damage this repair doing daily activities. As always, if you feel like something is amiss, call the office. ACO Transitions of Care Introducing [employer] [address] offers a voluntary care coordination program to provide high quality service and care to Christian Mack fee-for-service beneficiaries. [employer] Aleksandr was designed to help you enhance your health and well-being through the following services: ? Transitions of Care  support for individuals who are transitioning from one care setting to another (example: Hospital to home). ? Chronic and Complex Care Coordination  support for individuals and caregivers of those with serious or chronic illnesses or with more than one chronic (ongoing) condition and those who take a number of different medications. If you meet specific medical criteria, a [facility] [address] may call you directly to coordinate your care with your primary care physician and your other care providers. For questions about the [facility] programs, please, contact your physicians office. For general questions or additional information about Accountable Care Organizations: 
Please visit www.medicare.gov/acos. html or call 1-800-MEDICARE (3-542.784.9779) TTY users should call 1-598.388.5740. [employer] Announcement We are excited to announce that we are making your provider's discharge notes available to you in [employer]. You will see these notes when they are completed and signed by the physician that discharged you from your recent hospital stay. If you have any questions or concerns about any information you see in [employer], please call the Health Information Department where you were seen or reach out to your Primary Care Provider for more information about your plan of care. Introducing [facility]! Dear Tiki Pritchett: Thank you for requesting a [employer] account. Our records indicate that you already have an active [employer] account. You can access your account anytime at [email or web address] Did you know that you can access your hospital and ER discharge instructions at any time in [employer]? You can also review all of your test results from your hospital stay or ER visit. Additional Information If you have questions, please visit the Frequently Asked Questions section of the [employer] website at [email or web address] Remember, [employer] is NOT to be used for urgent needs. For medical emergencies, dial 911. Now available from your iPhone and Android! Providers Seen During Your Hospitalization Provider Specialty Primary office phone Sarah Duncan MD Obstetrics & Gynecology [phone] Your Primary Care Physician (PCP) Primary Care Physician Office Phone Office Fax Adelaida Rodriguez [phone] You are allergic to the following No active allergies Recent Documentation Height Weight Breastfeeding? BMI OB Status Smoking Status 1.575 m 64.2 kg No 25.89 kg/m2 Postmenopausal Former Smoker Emergency Contacts Name Discharge Info Relation Home Work Mobile Charles Westfall DISCHARGE CAREGIVER [3] Child [2] [phone] Patient Belongings The following personal items are in your possession at time of discharge: 
  Dental Appliances: None  Visual Aid: None      Home Medications: Sent home (inhaler)   Jewelry: None  Clothing: Pants, Shirt, Jacket/Coat, Socks, Footwear, Undergarments    Other Valuables: None Please provide this summary of care documentation to your next provider. Signatures-by signing, you are acknowledging that this After Visit Summary has been reviewed with you and you have received a copy. Patient Signature:  ____________________________________________________________ Date:  ____________________________________________________________  
  
[facility] Provider Signature:  ____________________________________________________________ Date:  ____________________________________________________________

## 2018-03-01 NOTE — ANESTHESIA POSTPROCEDURE EVALUATION
Post-Anesthesia Evaluation and Assessment    Patient: Maggie Glynn MRN: [id number]  SSN: [id number]    YOB: 1945  Age: 81 y.o. Sex: female       Cardiovascular Function/Vital Signs  Visit Vitals    BP 97/48    Pulse 68    Temp 36.4 °C (97.6 °F)    Resp 19    Ht 5' 2\" (1.575 m)    Wt 64.2 kg (141 lb 8.6 oz)    SpO2 99%    BMI 25.89 kg/m2       Patient is status post general anesthesia for Procedure(s):  DAVINCI LAPAROSCOPIC SUPRACERVICAL HYSTERECTOMY, BILATERAL SALPINGO OOPHORECTOMY, LYSIS OF ADHESIONS, SACROCOLPOPEXY  TENSION FREE VAGINAL TAPING OBTURATOR, CYSTOSCOPY. Nausea/Vomiting: None    Postoperative hydration reviewed and adequate. Pain:  Pain Scale 1: Numeric (0 - 10) (03/01/18 1226)  Pain Intensity 1: 4 (03/01/18 1226)   Managed    Neurological Status:   Neuro (WDL): Exceptions to WDL (03/01/18 1115)  Neuro  Neurologic State: Drowsy; Eyes open to stimulus; Pharmacologically induced (comment) (03/01/18 1115)  Orientation Level: Disoriented X4 (03/01/18 1115)  Cognition: No command following (03/01/18 1115)  Speech: Other (comment) (03/01/18 1115)  LUE Motor Response: Nonpurposeful (03/01/18 1115)  LLE Motor Response: Nonpurposeful (03/01/18 1115)  RUE Motor Response: Nonpurposeful (03/01/18 1115)  RLE Motor Response: Nonpurposeful (03/01/18 1115)   At baseline    Mental Status and Level of Consciousness: Arousable    Pulmonary Status:   O2 Device: Nasal cannula (03/01/18 1118)   Adequate oxygenation and airway patent    Complications related to anesthesia: None    Post-anesthesia assessment completed.  No concerns    Signed By: Mirta Duverney, MD     March 1, 2018

## 2018-03-02 VITALS
TEMPERATURE: 99 F | BODY MASS INDEX: 26.05 KG/M2 | OXYGEN SATURATION: 98 % | SYSTOLIC BLOOD PRESSURE: 102 MMHG | WEIGHT: 141.54 LBS | DIASTOLIC BLOOD PRESSURE: 52 MMHG | HEART RATE: 69 BPM | HEIGHT: 62 IN | RESPIRATION RATE: 16 BRPM

## 2018-03-02 LAB
HCT VFR BLD AUTO: 28.1 % (ref 35–47)
HGB BLD-MCNC: 9.4 G/DL (ref 11.5–16)

## 2018-03-02 PROCEDURE — 74011250637 HC RX REV CODE- 250/637: Performed by: OBSTETRICS & GYNECOLOGY

## 2018-03-02 PROCEDURE — 99218 HC RM OBSERVATION: CPT

## 2018-03-02 PROCEDURE — 77030018836 HC SOL IRR NACL ICUM -A

## 2018-03-02 PROCEDURE — 36415 COLL VENOUS BLD VENIPUNCTURE: CPT | Performed by: OBSTETRICS & GYNECOLOGY

## 2018-03-02 PROCEDURE — 74011250636 HC RX REV CODE- 250/636: Performed by: OBSTETRICS & GYNECOLOGY

## 2018-03-02 PROCEDURE — 85018 HEMOGLOBIN: CPT | Performed by: OBSTETRICS & GYNECOLOGY

## 2018-03-02 PROCEDURE — 77010033678 HC OXYGEN DAILY

## 2018-03-02 RX ORDER — TRAMADOL HYDROCHLORIDE 50 MG/1
50 TABLET ORAL
Qty: 18 TAB | Refills: 0 | Status: SHIPPED | OUTPATIENT
Start: 2018-03-02 | End: 2018-03-02

## 2018-03-02 RX ORDER — TRAMADOL HYDROCHLORIDE 50 MG/1
50 TABLET ORAL
Qty: 18 TAB | Refills: 0 | Status: SHIPPED | OUTPATIENT
Start: 2018-03-02 | End: 2018-10-25 | Stop reason: SDUPTHER

## 2018-03-02 RX ADMIN — SODIUM CHLORIDE, SODIUM LACTATE, POTASSIUM CHLORIDE, AND CALCIUM CHLORIDE 125 ML/HR: 600; 310; 30; 20 INJECTION, SOLUTION INTRAVENOUS at 01:06

## 2018-03-02 RX ADMIN — Medication 10 ML: at 06:32

## 2018-03-02 RX ADMIN — LEVOTHYROXINE SODIUM 112 MCG: 112 TABLET ORAL at 06:31

## 2018-03-02 NOTE — DISCHARGE INSTRUCTIONS
Satyaincapollo Laparoscopic hysterectomy  Procedure-specific postoperative instructions  General Instructions   Make an appointment to come back to the office in about 2-3 weeks. Eat and drink your normal diet. Take laxatives as needed. Call us if you have questions or problems. Incision care   With this procedure you will have 5 small incisions. One or more may be more sensitive than the others. Ice packs or heating pad (low setting--don't burn yourself) can be helpful. Treat these incisions like normal skin. You can shower and wash this skin as you normally would. You need to call the office if there is spreading redness around the incision, it feels hot, or has drainage other than just a mild blood-tinged appearance. Supracervical Hysterectomy   If the uterus was removed but the cervix was not removed, then you have NO restriction. This may be hard to believe, but you can literally do anything you want to do and no damage will occur. Now, you may not FEEL like doing much. Any major surgery is a stress on your body. So you may be tired, have no energy, may feel weak and listless. But no damage will occur if you lift or do strenuous activity. Listen to your body. If it is telling you to rest, do so. If you feel good, you won't hurt anything by going shopping of doing household activities. Total Hysterectomy   If the uterus and cervix were removed, recovery is different. This top of the vagina where the cervix was is now closed with suture. That area has to heal before you do strenuous activities, put anything in the vagina, or lift anything that you feel you have to strain, typically that would be 15 pounds or more. The only thing holding your insides (intestines) in place are the sutures holding the top of the vagina closed, so the last thing you want is for that to fail. Abstaining from sex is mandatory for 6 weeks. Infection at that area is a possible cause of failure. Symptoms of that could be pain getting worse in the pelvic area instead of better, fever, or a change from the normal discharge to having a foul odor. Call us if those things are happening. Antibiotics will usually clear that up quickly. Regarding Ovaries   If your ovaries (or one ovary) were left in place you will not need hormones. There may be some fluctuations in hormone levels and resulting temperature or mood issues but these should be mild and temporary. If your ovaries have been removed you will need to use replacement hormones (unless you've had breast cancer or blood clots in lungs or legs in the past). Within 48 hours after removal of ovaries, hormone levels fall and most women will have some degree of menopausal symptoms. These can be very difficult. We use an estrogen patch for at least the first 6 weeks after surgery. This will alleviate nearly all of the symptoms you would have. Dr. Juan Ramon Davis will discuss the long term use of estrogen with you at the postoperative visit. The patch is used postoperatively because it has minimal increase in the risk of blood clots, etc.  If you have any problems with the patch, call the office. daVinci Sacrocolpopexy  Procedure-specific postoperative instructions      What to expect   You have had major surgery through tiny incisions. The most important factor after this surgery to anchor everything back in place is to not tear it loose. That means no significant lifting for 6 weeks. After that healing is complete and you resume all activites. The chances of undoing the surgery by doing anything are very small, but not zero. You can expect some pulling or sharp sensations. It is not likely to be severe and other than lifting or falling you will not damage this repair doing daily activities. As always, if you feel like something is amiss, call the office.     TVT  Tension free Vaginal Tape  Procedure-specific postoperative instructions    Bladder care   Your bladder may be irritable following this surgery, so you may need to go more frequently or urgently than before. Please be patient, this will usually go away within the first six weeks without treatment. It is possible, however to get a bladder infection after surgery. If it is burning as you urinate, symptoms are getting worse, or you see blood in your urine consistently, call us. Otherwise, it is important that you empty your bladder when you go. You may need to take extra time or even press gently above your pubic bone as you urinate to help your bladder empty. What to expect   You have had minor surgery through a tiny incision in the front of the vagina. The most important factor after this surgery to anchor the bladder neck back in place is to not tear it loose. That means no significant lifting for 6 weeks. After that healing is complete and you can resume normal activity. The chances of undoing the surgery are very small, but not zero. You can expect some pulling or sharp sensations. It is not likely to be severe and other than lifting heavy objects or falling you will not damage this repair doing daily activities. As always, if you feel like something is amiss, call the office.

## 2018-03-02 NOTE — DISCHARGE SUMMARY
Gynecology Surgical Discharge Summary     Name: Della Moody MRN: [id number]  SSN: [id number]    YOB: 1945  Age: 81 y.o. Sex: female      Admit date: 3/1/2018    Discharge Date: 3/2/2018      Attending Physician: Velia Hickman MD     Admission Diagnoses: CYSTOCELE. RECTOCELE, URINARY PROLAPSE, STRESS URINARY INCO*    Discharge Diagnoses: CYSTOCELE. RECTOCELE, URINARY PROLAPSE, STRESS URINARY INCO*     Procedures: Procedure(s):  DAVINCI LAPAROSCOPIC SUPRACERVICAL HYSTERECTOMY, BILATERAL SALPINGO OOPHORECTOMY, LYSIS OF ADHESIONS, SACROCOLPOPEXY  TENSION FREE VAGINAL TAPING OBTURATOR, CYSTOSCOPY    Hospital Course: Normal hospital course for this procedure. The patient was released to her home in good condition. Significant Diagnostic Studies:   Recent Results (from the past 24 hour(s))   HGB & HCT    Collection Time: 03/02/18  5:03 AM   Result Value Ref Range    HGB 9.4 (L) 11.5 - 16.0 g/dL    HCT 28.1 (L) 35.0 - 47.0 %       Patient Instructions:     Diet, activity, wound care: See printed instructions. Follow-up Appointments   Procedures    FOLLOW UP VISIT Appointment in: Two Weeks     Standing Status:   Standing     Number of Occurrences:   1     Order Specific Question:   Appointment in     Answer:    Two Weeks        Signed By:  Velia Hickman MD     March 2, 2018

## 2018-03-02 NOTE — ROUTINE PROCESS
Bedside and Verbal shift change report given to Jakob Hyman RN (oncoming nurse) by Angle Yin RN (offgoing nurse). Report included the following information SBAR, Intake/Output and MAR.

## 2018-03-02 NOTE — PROGRESS NOTES
Gynecology Progress Note    Post-op day 1. No significant complaints. Pain controlled on current medication. Catheter to be removed this am.     Vitals:  Blood pressure 98/44, pulse 67, temperature 99.1 °F (37.3 °C), resp. rate 18, height 5' 2\" (1.575 m), weight 141 lb 8.6 oz (64.2 kg), SpO2 99 %, not currently breastfeeding. Temp (24hrs), Av °F (36.7 °C), Min:97.6 °F (36.4 °C), Max:99.1 °F (37.3 °C)      I and O: OK    Exam:  Patient without distress. Abdomen soft,  nontender. Incisions clean, dry, and intact. Lower extremities are negative for swelling, cords, or tenderness. Labs:   Recent Results (from the past 24 hour(s))   HGB & HCT    Collection Time: 18  5:03 AM   Result Value Ref Range    HGB 9.4 (L) 11.5 - 16.0 g/dL    HCT 28.1 (L) 35.0 - 47.0 %       Assessment and Plan: Patient doing well. Uncomplicated post Procedure(s):  DAVINCI LAPAROSCOPIC SUPRACERVICAL HYSTERECTOMY, BILATERAL SALPINGO OOPHORECTOMY, LYSIS OF ADHESIONS, SACROCOLPOPEXY  TENSION FREE VAGINAL TAPING OBTURATOR, CYSTOSCOPY course. Continue post-op care at home. Instructions given and printed per nursing staff.

## 2018-03-02 NOTE — PROGRESS NOTES
Met with pt and her son. Pt's address and contact information were confirmed. PTA she was independent with ADLs and driving. She has no prior HH hx and does not own any DME. Pharmacy is [facility] on [address]. Pt does not anticipate any d/c needs. Her son will provide transport at discharge. Pt's nurse navigator, Victorina Reed, was notified of pt's hospitalization. Observation letters have been reviewed and provided to pt. Justice Multani LCSW    Care Management Interventions  PCP Verified by CM:  Yes (Dr. Tha Mcintyre)  Discharge Durable Medical Equipment: No  Physical Therapy Consult: No  Occupational Therapy Consult: No  Speech Therapy Consult: No  Current Support Network: Lives Alone  Confirm Follow Up Transport: Family  Plan discussed with Pt/Family/Caregiver: Yes  Discharge Location  Discharge Placement: Home

## 2018-03-02 NOTE — PROGRESS NOTES
I have reviewed discharge instructions with the patient and son: reviewed meds (Pt stated agreed with MD to take Aleve at home), f/up appointments, s/s to report. The patient and son verbalized understanding. Escorted to car in [facility] at comfort level.

## 2018-03-15 ENCOUNTER — OFFICE VISIT (OUTPATIENT)
Dept: OBGYN CLINIC | Age: 73
End: 2018-03-15

## 2018-03-15 VITALS
DIASTOLIC BLOOD PRESSURE: 60 MMHG | BODY MASS INDEX: 25.95 KG/M2 | HEIGHT: 62 IN | WEIGHT: 141 LBS | SYSTOLIC BLOOD PRESSURE: 110 MMHG

## 2018-03-15 DIAGNOSIS — Z09 POSTOP CHECK: Primary | ICD-10-CM

## 2018-03-15 NOTE — PROGRESS NOTES
Postop LSH/Sacrocolpopexy/TVT Evaluation  Maggie Glynn is a 81 y.o. female returns for a routine post-operative follow-up visit after undergoing a daVinci LSH/SCP with TVT which was done 2 weeks ago. Her pathology results revealed    FINAL PATHOLOGIC DIAGNOSIS   Uterus, fallopian tubes, ovaries, hysterectomy, salpingo-oophorectomy: 45g  Endometrium: Inactive endometrium. Myometrium: No histopathologic abnormality. Fallopian tubes: No histopathologic abnormality. Ovaries: Benign physiologic changes. .  Since the patient's surgery, she has had typical postoperative discomfort but no significant symptoms or problems since the surgery. The patient's incisions are healing well with no significant drainage. She states since the procedure, she has returned to full daily activities, ambulating, and not lifting or exercising.   PHYSICAL EXAMINATION    Gastrointestinal  · Abdominal Examination: abdomen non-tender to palpation, incisions healing well, normal bowel sounds, no masses present  · Liver and spleen: no hepatomegaly present, spleen not palpable  · Hernias: no hernias identified    Genitourinary  · External Genitalia: normal appearance for age, no discharge present, no tenderness present, no inflammatory lesions present, no masses present, no atrophy present  · Vagina: well supported vaginal vault with no exposure of mesh, TVT intact, well placed, suture intact with no significant discharge present, no inflammatory lesions present, no masses present  · Bladder: non-tender to palpation  · Urethra: appears normal  · Cervix: normal   · Uterus: absent  · Adnexa: no adnexal tenderness present, no adnexal masses present  · Perineum: perineum within normal limits, no evidence of trauma, no rashes or skin lesions present  Skin  · General Inspection: no rash, no lesions identified    Neurologic/Psychiatric  · Mental Status:  · Orientation: grossly oriented to person, place and time  · Mood and Affect: mood normal, affect appropriate    Assessment:  Normal postop checkup LSH/SCP/TVT    Plan:  RTO PRN/ AE's with FW

## 2018-07-13 ENCOUNTER — HOSPITAL ENCOUNTER (OUTPATIENT)
Dept: MAMMOGRAPHY | Age: 73
Discharge: HOME | End: 2018-07-13
Attending: INTERNAL MEDICINE
Payer: MEDICARE

## 2018-07-13 DIAGNOSIS — Z12.31 VISIT FOR SCREENING MAMMOGRAM: ICD-10-CM

## 2018-07-13 PROCEDURE — 77063 BREAST TOMOSYNTHESIS BI: CPT

## 2018-10-08 ENCOUNTER — TELEPHONE (OUTPATIENT)
Dept: INTERNAL MEDICINE CLINIC | Age: 73
End: 2018-10-08

## 2018-10-08 NOTE — TELEPHONE ENCOUNTER
I called Loyda Martinez back, no answer. LM on personal VM to add lipid panel if possible to lab order. If they need our office to fax over the order please call back.

## 2018-10-08 NOTE — TELEPHONE ENCOUNTER
[facility] pool/ Dr. Suma Terry. Emelyn Lovell is requesting to speak with the nurse to discuss what labs patient needs to have completed with our office.  She can be reached at [phone]

## 2018-10-08 NOTE — TELEPHONE ENCOUNTER
I spoke with Hanna Coats at Dr. Deb Shen office. They are ordering labs on patient and wanted to see if PCP wanted to add additional labs so pt only has to be stuck once. They are ordering: TSH, Vitamin D, Sed Rate, C reactive protein, Uric Acid, 10 Panel and CBC. Would you like to add anything else?

## 2018-10-24 ENCOUNTER — HOSPITAL ENCOUNTER (OUTPATIENT)
Dept: MAMMOGRAPHY | Age: 73
Discharge: HOME | End: 2018-10-24
Attending: PODIATRIST
Payer: MEDICARE

## 2018-10-24 DIAGNOSIS — E55.9 VITAMIN D INSUFFICIENCY: ICD-10-CM

## 2018-10-24 DIAGNOSIS — Q78.0: ICD-10-CM

## 2018-10-24 PROCEDURE — 77080 DXA BONE DENSITY AXIAL: CPT

## 2018-10-25 ENCOUNTER — OFFICE VISIT (OUTPATIENT)
Dept: INTERNAL MEDICINE CLINIC | Age: 73
End: 2018-10-25

## 2018-10-25 ENCOUNTER — TELEPHONE (OUTPATIENT)
Dept: INTERNAL MEDICINE CLINIC | Age: 73
End: 2018-10-25

## 2018-10-25 VITALS
HEIGHT: 62 IN | SYSTOLIC BLOOD PRESSURE: 123 MMHG | WEIGHT: 139.4 LBS | DIASTOLIC BLOOD PRESSURE: 73 MMHG | OXYGEN SATURATION: 97 % | RESPIRATION RATE: 18 BRPM | HEART RATE: 64 BPM | TEMPERATURE: 97.8 F | BODY MASS INDEX: 25.65 KG/M2

## 2018-10-25 DIAGNOSIS — G89.29 CHRONIC PAIN OF LEFT KNEE: ICD-10-CM

## 2018-10-25 DIAGNOSIS — Z12.31 ENCOUNTER FOR SCREENING MAMMOGRAM FOR MALIGNANT NEOPLASM OF BREAST: ICD-10-CM

## 2018-10-25 DIAGNOSIS — M25.562 CHRONIC PAIN OF LEFT KNEE: ICD-10-CM

## 2018-10-25 DIAGNOSIS — I70.90 ATHEROSCLEROSIS OF ARTERY: ICD-10-CM

## 2018-10-25 DIAGNOSIS — I10 ESSENTIAL HYPERTENSION: ICD-10-CM

## 2018-10-25 DIAGNOSIS — E07.9 THYROID DISORDER: ICD-10-CM

## 2018-10-25 DIAGNOSIS — H93.13 TINNITUS OF BOTH EARS: ICD-10-CM

## 2018-10-25 DIAGNOSIS — Z13.31 SCREENING FOR DEPRESSION: ICD-10-CM

## 2018-10-25 DIAGNOSIS — D64.9 ANEMIA, UNSPECIFIED TYPE: ICD-10-CM

## 2018-10-25 DIAGNOSIS — Z00.00 MEDICARE ANNUAL WELLNESS VISIT, SUBSEQUENT: Primary | ICD-10-CM

## 2018-10-25 DIAGNOSIS — Z13.39 SCREENING FOR ALCOHOLISM: ICD-10-CM

## 2018-10-25 DIAGNOSIS — J45.20 RAD (REACTIVE AIRWAY DISEASE), MILD INTERMITTENT, UNCOMPLICATED: ICD-10-CM

## 2018-10-25 DIAGNOSIS — Z12.11 SCREEN FOR COLON CANCER: ICD-10-CM

## 2018-10-25 RX ORDER — ALBUTEROL SULFATE 90 UG/1
2 AEROSOL, METERED RESPIRATORY (INHALATION)
Qty: 1 INHALER | Refills: 3 | Status: SHIPPED | OUTPATIENT
Start: 2018-10-25 | End: 2019-09-05 | Stop reason: SDUPTHER

## 2018-10-25 RX ORDER — LOSARTAN POTASSIUM 25 MG/1
TABLET ORAL
Qty: 90 TAB | Refills: 3 | Status: SHIPPED | OUTPATIENT
Start: 2018-10-25 | End: 2019-10-07 | Stop reason: SDUPTHER

## 2018-10-25 RX ORDER — LEVOTHYROXINE SODIUM 112 UG/1
TABLET ORAL
Qty: 90 TAB | Refills: 1 | Status: SHIPPED | OUTPATIENT
Start: 2018-10-25 | End: 2018-12-19 | Stop reason: SDUPTHER

## 2018-10-25 NOTE — TELEPHONE ENCOUNTER
Dr Anthony [facility] office ([phone])  contacted and message left requesting last visit office notes for  Dr Ravi Lugo. [phone] [facility] office phone and fax provided.
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
## 2018-10-25 NOTE — PATIENT INSTRUCTIONS
Medicare Wellness Visit, Female The best way to live healthy is to have a lifestyle where you eat a well-balanced diet, exercise regularly, limit alcohol use, and quit all forms of tobacco/nicotine, if applicable. Regular preventive services are another way to keep healthy. Preventive services (vaccines, screening tests, monitoring & exams) can help personalize your care plan, which helps you manage your own care. Screening tests can find health problems at the earliest stages, when they are easiest to treat. [facility] follows the current, evidence-based guidelines published by the [facility] Mikhail Becky ([facility]) when recommending preventive services for our patients. Because we follow these guidelines, sometimes recommendations change over time as research supports it. (For example, mammograms used to be recommended annually. Even though Medicare will still pay for an annual mammogram, the newer guidelines recommend a mammogram every two years for women of average risk.) Of course, you and your doctor may decide to screen more often for some diseases, based on your risk and your health status. Preventive services for you include: - Medicare offers their members a free annual wellness visit, which is time for you and your primary care provider to discuss and plan for your preventive service needs. Take advantage of this benefit every year! 
-All adults over the age of 72 should receive the recommended pneumonia vaccines. Current USPSTF guidelines recommend a series of two vaccines for the best pneumonia protection.  
-All adults should have a flu vaccine yearly and a tetanus vaccine every 10 years. All adults age 61 and older should receive a shingles vaccine once in their lifetime.   
-A bone mass density test is recommended when a woman turns 65 to screen for osteoporosis. This test is only recommended one time, as a screening. Some providers will use this same test as a disease monitoring tool if you already have osteoporosis. -All adults age 38-68 who are overweight should have a diabetes screening test once every three years.  
-Other screening tests and preventive services for persons with diabetes include: an eye exam to screen for diabetic retinopathy, a kidney function test, a foot exam, and stricter control over your cholesterol.  
-Cardiovascular screening for adults with routine risk involves an electrocardiogram (ECG) at intervals determined by your doctor.  
-Colorectal cancer screenings should be done for adults age 54-65 with no increased risk factors for colorectal cancer. There are a number of acceptable methods of screening for this type of cancer. Each test has its own benefits and drawbacks. Discuss with your doctor what is most appropriate for you during your annual wellness visit. The different tests include: colonoscopy (considered the best screening method), a fecal occult blood test, a fecal DNA test, and sigmoidoscopy. -Breast cancer screenings are recommended every other year for women of normal risk, age 54-69. 
-Cervical cancer screenings for women over age 72 are only recommended with certain risk factors.  
-All adults born between [facility] should be screened once for Hepatitis C. Here is a list of your current Health Maintenance items (your personalized list of preventive services) with a due date: 
Health Maintenance Due Topic Date Due  Shingles Vaccine (1 of 2) 10/08/1997  Pneumococcal Vaccine (2 of 2 - PPSV23) 10/31/2016 [address] [place] [address] Annual Well Visit  08/11/2018 Medicare Wellness Visit, Female The best way to live healthy is to have a lifestyle where you eat a well-balanced diet, exercise regularly, limit alcohol use, and quit all forms of tobacco/nicotine, if applicable. Regular preventive services are another way to keep healthy.  Preventive services (vaccines, screening tests, monitoring & exams) can help personalize your care plan, which helps you manage your own care. Screening tests can find health problems at the earliest stages, when they are easiest to treat. [facility] follows the current, evidence-based guidelines published by the [facility] Mikhail Pena ([facility]) when recommending preventive services for our patients. Because we follow these guidelines, sometimes recommendations change over time as research supports it. (For example, mammograms used to be recommended annually. Even though Medicare will still pay for an annual mammogram, the newer guidelines recommend a mammogram every two years for women of average risk.) Of course, you and your doctor may decide to screen more often for some diseases, based on your risk and your health status. Preventive services for you include: - Medicare offers their members a free annual wellness visit, which is time for you and your primary care provider to discuss and plan for your preventive service needs. Take advantage of this benefit every year! 
-All adults over the age of 72 should receive the recommended pneumonia vaccines. Current USPSTF guidelines recommend a series of two vaccines for the best pneumonia protection.  
-All adults should have a flu vaccine yearly and a tetanus vaccine every 10 years. All adults age 61 and older should receive a shingles vaccine once in their lifetime.   
-A bone mass density test is recommended when a woman turns 65 to screen for osteoporosis. This test is only recommended one time, as a screening. Some providers will use this same test as a disease monitoring tool if you already have osteoporosis.  
-All adults age 38-68 who are overweight should have a diabetes screening test once every three years.  
-Other screening tests and preventive services for persons with diabetes include: an eye exam to screen for diabetic retinopathy, a kidney function test, a foot exam, and stricter control over your cholesterol.  
-Cardiovascular screening for adults with routine risk involves an electrocardiogram (ECG) at intervals determined by your doctor.  
-Colorectal cancer screenings should be done for adults age 54-65 with no increased risk factors for colorectal cancer. There are a number of acceptable methods of screening for this type of cancer. Each test has its own benefits and drawbacks. Discuss with your doctor what is most appropriate for you during your annual wellness visit. The different tests include: colonoscopy (considered the best screening method), a fecal occult blood test, a fecal DNA test, and sigmoidoscopy. -Breast cancer screenings are recommended every other year for women of normal risk, age 54-69. 
-Cervical cancer screenings for women over age 72 are only recommended with certain risk factors.  
-All adults born between [facility] should be screened once for Hepatitis C. Here is a list of your current Health Maintenance items (your personalized list of preventive services) with a due date: 
Health Maintenance Due Topic Date Due  Shingles Vaccine (1 of 2) 10/08/1997 [address] [place] [address] Annual Well Visit  08/11/2018  Pneumococcal Vaccine (2 of 2 - PPSV23) 10/25/2018

## 2018-10-25 NOTE — PROGRESS NOTES
Petty Baker is a 72 y.o. female Patient would like to discuss losartan 50 mg . Patient states she was instructed to stop medication at her last visit in 04/2018 because of lower blood pressure readings. Patient states she restarted losartan 50 mg but is taking a half a tablet daily. Patient has a record of bp readings . Chief Complaint Patient presents with [facility] Annual Wellness Visit 1. Have you been to the ER, urgent care clinic since your last visit? Hospitalized since your last visit? No 
M 
2. Have you seen or consulted any other health care providers outside of the [address] [place] [address] since your last visit? Include any pap smears or colon screening. No 
 
 
Visit Vitals /73 (BP 1 Location: Right arm, BP Patient Position: Sitting) Pulse 64 Temp 97.8 °F (36.6 °C) (Oral) Resp 18 Ht 5' 2\" (1.575 m) Wt 139 lb 6.4 oz (63.2 kg) SpO2 97% BMI 25.50 kg/m² Health Maintenance Due Topic Date Due  Shingrix Vaccine Age 50> (1 of 2) 10/08/1997  Pneumococcal 65+ Low/Medium Risk (2 of 2 - PPSV23) 10/31/2016  Influenza Age 5 to Adult  08/01/2018  MEDICARE YEARLY EXAM  08/11/2018 Medication Reconciliation completed, changes noted.   Please  Update medication list.

## 2018-11-01 ENCOUNTER — HOSPITAL ENCOUNTER (OUTPATIENT)
Dept: VASCULAR SURGERY | Age: 73
Discharge: HOME | End: 2018-11-01
Attending: INTERNAL MEDICINE
Payer: MEDICARE

## 2018-11-01 DIAGNOSIS — I70.90 ATHEROSCLEROSIS OF ARTERY: ICD-10-CM

## 2018-11-01 PROCEDURE — 93880 EXTRACRANIAL BILAT STUDY: CPT

## 2018-11-01 NOTE — PROCEDURES
[facility] 89  *** FINAL REPORT ***    Name: Octavia Powell  MRN: [id number]    Outpatient  : 08 Oct 1945  H[facility] Order #: [id number]  [address] [facility] Visit #: [id number]  Date: 2018    TYPE OF TEST: Cerebrovascular Duplex    REASON FOR TEST  Atherosclerosis    Right Carotid:-             Proximal               Mid                 Distal  cm/s  Systolic  Diastolic  Systolic  Diastolic  Systolic  Diastolic  CCA:     65.4      20.4                            78.2      25.8  Bulb:  ECA:     80.8      15.4  ICA:     69.4      21.5       85.6      31.7      135.1      49.4  ICA/CCA:  0.9       0.8    ICA Stenosis: <50%    Right Vertebral:-  Finding: Antegrade  Sys:       51.0  Lexis:       12.5    Right Subclavian:    Left Carotid:-            Proximal                Mid                 Distal  cm/s  Systolic  Diastolic  Systolic  Diastolic  Systolic  Diastolic  CCA:     32.0      21.5                            71.2      24.9  Bulb:  ECA:     61.6      13.6  ICA:     42.0      14.6      144.7      43.0       58.0      20.4  ICA/CCA:  0.6       0.6    ICA Stenosis: 50-69%    Left Vertebral:-  Finding: Antegrade  Sys:       42.0  Lexis:       12.9    Left Subclavian:    INTERPRETATION/FINDINGS  PROCEDURE:  Evaluation of the extracranial cerebrovascular arteries  with ultrasound (B-mode imaging, pulsed Doppler, color Doppler). Includes the common carotid, internal carotid, external carotid, and  vertebral arteries. FINDINGS: There is evidence of hetereogenous plaque noted in the right   ICA, and intimal thickening  noted in bilateral CCA. Elevated  velocities noted in the right distal ICA and left mid ICA due to  toutuousity with no evidence of stenosis. IMPRESSION: Findings are consistent with  0-49% stenosis of the right  internal carotid and 0-49% stenosis of the left internal carotid. Vertebrals are patent with antegrade flow.     ADDITIONAL COMMENTS    I have personally reviewed the data relevant to the interpretation of  this  study. TECHNOLOGIST: Edward Mann  Signed: 11/01/2018 03:53 PM    PHYSICIAN: Chio Gilbert.  Kim Thompson MD  Signed: 11/01/2018 06:08 PM

## 2018-11-01 NOTE — LETTER
2018 4:32 PM 
 
Ms. Douglas Angulo [address] [id number] Dear Douglas Angulo: Please find your most recent results below. Resulted Orders DUPLEX CAROTID BILATERAL Transcription [facility] FINAL REPORT Name: Yolande Hernandez 
MRN: [id number]    Outpatient : 08 Oct 1945 [facility] Order #: [id number] [place] [facility] Visit #: [id number] Date: 2018 TYPE OF TEST: Cerebrovascular Duplex REASON FOR TEST Atherosclerosis Right Carotid:- 
           Proximal               Mid                 Distal 
cm/s  Systolic  Diastolic  Systolic  Diastolic  Systolic  Diastolic CCA:     79.8      20.4                            78.2      25.8 Bulb: 
ECA:     80.8      15.4 ICA:     69.4      21.5       85.6      31.7      135.1      49.4 ICA/CCA:  0.9       0.8 ICA Stenosis: <50% Right Vertebral:- 
Finding: Antegrade Sys:       51.0 Lexis:       12.5 Right Subclavian: 
 
Left Carotid:- 
          Proximal                Mid                 Distal 
cm/s  Systolic  Diastolic  Systolic  Diastolic  Systolic  Diastolic CCA:     86.0      21.5                            71.2      24.9 Bulb: 
ECA:     61.6      13.6 ICA:     42.0      14.6      144.7      43.0       58.0      20.4 ICA/CCA:  0.6       0.6 ICA Stenosis: 50-69% Left Vertebral:- 
Finding: Antegrade Sys:       42.0 Lexis:       12.9 Left Subclavian: INTERPRETATION/FINDINGS 
PROCEDURE:  Evaluation of the extracranial cerebrovascular arteries 
with ultrasound (B-mode imaging, pulsed Doppler, color Doppler). Includes the common carotid, internal carotid, external carotid, and 
vertebral arteries. FINDINGS: There is evidence of hetereogenous plaque noted in the right 
 ICA, and intimal thickening  noted in bilateral CCA. Elevated 
velocities noted in the right distal ICA and left mid ICA due to 
toutuousity with no evidence of stenosis. IMPRESSION: Findings are consistent with  0-49% stenosis of the right 
internal carotid and 0-49% stenosis of the left internal carotid. Vertebrals are patent with antegrade flow. ADDITIONAL COMMENTS I have personally reviewed the data relevant to the interpretation of 
this 
study. TECHNOLOGIST: Edward Jj Signed: 11/01/2018 03:53 PM 
 
PHYSICIAN: Blue Walden. Franky Jeong MD 
Signed: 11/01/2018 06:08 PM 
 
  
 
 
RECOMMENDATIONS: 
Thank you for following up on your ultrasound. There was some plaque noted on the right and left carotid but not at a level signifcant for intervention. Please continue with your heart healthy diet and exercise as tolerated. jbk Sincerely, Marisela Lu MD

## 2018-12-19 ENCOUNTER — OFFICE VISIT (OUTPATIENT)
Dept: INTERNAL MEDICINE CLINIC | Age: 73
End: 2018-12-19

## 2018-12-19 VITALS
WEIGHT: 143.4 LBS | HEIGHT: 62 IN | DIASTOLIC BLOOD PRESSURE: 70 MMHG | HEART RATE: 63 BPM | OXYGEN SATURATION: 96 % | RESPIRATION RATE: 12 BRPM | SYSTOLIC BLOOD PRESSURE: 110 MMHG | BODY MASS INDEX: 26.39 KG/M2 | TEMPERATURE: 98.1 F

## 2018-12-19 DIAGNOSIS — M81.0 AGE-RELATED OSTEOPOROSIS WITHOUT CURRENT PATHOLOGICAL FRACTURE: Primary | ICD-10-CM

## 2018-12-19 DIAGNOSIS — E07.9 THYROID DISORDER: ICD-10-CM

## 2018-12-19 RX ORDER — LEVOTHYROXINE SODIUM 112 UG/1
TABLET ORAL
Qty: 90 TAB | Refills: 1 | Status: SHIPPED | OUTPATIENT
Start: 2018-12-19 | End: 2019-07-11 | Stop reason: SDUPTHER

## 2018-12-19 RX ORDER — ALENDRONATE SODIUM 70 MG/1
70 TABLET ORAL
Qty: 12 TAB | Refills: 3 | Status: SHIPPED | OUTPATIENT
Start: 2018-12-19 | End: 2020-02-13

## 2018-12-19 NOTE — PATIENT INSTRUCTIONS
Osteoporosis: Care Instructions  Your Care Instructions    Osteoporosis causes bones to become thin and weak. It is much more common in women than in men. Osteoporosis may be very advanced before you know you have it. Sometimes the first sign is a broken bone in the hip, spine, or wrist or sudden pain in your middle or lower back. Follow-up care is a key part of your treatment and safety. Be sure to make and go to all appointments, and call your doctor if you are having problems. It's also a good idea to know your test results and keep a list of the medicines you take. How can you care for yourself at home? · Your doctor may prescribe a bisphosphonate, such as risedronate (Actonel) or alendronate (Fosamax), for osteoporosis. If you are taking one of these medicines by mouth:  ? Take your medicine with a full glass of water when you first get up in the morning. ? Do not lie down, eat, drink a beverage, or take any other medicine for at least 30 minutes after taking the drug. This helps prevent stomach problems. ? Do not take your medicine late in the day if you forgot to take it in the morning. Skip it, and take the usual dose the next morning. ? If you have side effects, tell your doctor. He or she may prescribe another medicine. · Get enough calcium and vitamin D. The [employer] [employer] recommends adults younger than age 46 need 1,000 mg of calcium and 600 IU of vitamin D each day. Women ages 46 to 79 need 1,200 mg of calcium and 600 IU of vitamin D each day. Men ages 46 to 79 need 1,000 mg of calcium and 600 IU of vitamin D each day. Adults 71 and older need 1,200 mg of calcium and 800 IU of vitamin D each day. ? Eat foods rich in calcium, like yogurt, cheese, milk, and dark green vegetables. This is a good way to get the calcium you need. You can get vitamin D from eggs, fatty fish, cereal, and milk. ? Talk to your doctor about taking a calcium plus vitamin D supplement. Be careful, though. Adults ages 23 to 48 should not get more than 2,500 mg of calcium and 4,000 IU of vitamin D each day, whether it is from supplements and/or food. Adults ages 46 and older should not get more than 2,000 mg of calcium and 4,000 IU of vitamin D each day from supplements and/or food. · Limit alcohol to 2 drinks a day for men and 1 drink a day for women. Too much alcohol can cause health problems. · Do not smoke. Smoking puts you at a much higher risk for osteoporosis. If you need help quitting, talk to your doctor about stop-smoking programs and medicines. These can increase your chances of quitting for good. · Get regular bone-building exercise. Weight-bearing and resistance exercises keep bones healthy by working the muscles and bones against gravity. Start out at an exercise level that feels right for you. Add a little at a time until you can do the following:  ? Do 30 minutes of weight-bearing exercise on most days of the week. Walking, jogging, stair climbing, and dancing are good choices. ? Do resistance exercises with weights or elastic bands 2 to 3 days a week. · Reduce your risk of falls:  ? Wear supportive shoes with low heels and nonslip soles. ? Use a cane or walker, if you need it. Use shower chairs and bath benches. Put in handrails on stairways, around your shower or tub area, and near the toilet. ? Keep stairs, porches, and walkways well lit. Use night-lights. ? Remove throw rugs and other objects that are in the way. ? Avoid icy, wet, or slippery surfaces. ? Keep a cordless phone and a flashlight with new batteries by your bed. When should you call for help? Watch closely for changes in your health, and be sure to contact your doctor if you have any problems. Where can you learn more? Go to http://carl-fozia.info/. Enter K100 in the search box to learn more about \"Osteoporosis: Care Instructions. \"  Current as of: March 16, 2018  Content Version: 11.8  © 4485-9078 [employer]. Care instructions adapted under license by [employer] (which disclaims liability or warranty for this information). If you have questions about a medical condition or this instruction, always ask your healthcare professional. [address] any warranty or liability for your use of this information.
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
## 2019-07-15 ENCOUNTER — HOSPITAL ENCOUNTER (OUTPATIENT)
Dept: MAMMOGRAPHY | Age: 74
Discharge: HOME | End: 2019-07-15
Attending: INTERNAL MEDICINE
Payer: MEDICARE

## 2019-07-15 DIAGNOSIS — Z12.31 ENCOUNTER FOR SCREENING MAMMOGRAM FOR MALIGNANT NEOPLASM OF BREAST: ICD-10-CM

## 2019-07-15 PROCEDURE — 77063 BREAST TOMOSYNTHESIS BI: CPT

## 2019-09-05 DIAGNOSIS — J45.20 RAD (REACTIVE AIRWAY DISEASE), MILD INTERMITTENT, UNCOMPLICATED: ICD-10-CM

## 2019-09-05 RX ORDER — ALBUTEROL SULFATE 90 UG/1
2 AEROSOL, METERED RESPIRATORY (INHALATION)
Qty: 1 INHALER | Refills: 3 | Status: SHIPPED | OUTPATIENT
Start: 2019-09-05 | End: 2020-12-03

## 2019-10-07 DIAGNOSIS — I10 ESSENTIAL HYPERTENSION: ICD-10-CM

## 2019-10-07 RX ORDER — LOSARTAN POTASSIUM 25 MG/1
TABLET ORAL
Qty: 90 TAB | Refills: 4 | Status: SHIPPED | OUTPATIENT
Start: 2019-10-07 | End: 2021-01-02

## 2019-10-20 ENCOUNTER — APPOINTMENT (OUTPATIENT)
Dept: GENERAL RADIOLOGY | Age: 74
End: 2019-10-20
Attending: NURSE PRACTITIONER
Payer: MEDICARE

## 2019-10-20 ENCOUNTER — HOSPITAL ENCOUNTER (EMERGENCY)
Age: 74
Discharge: HOME | End: 2019-10-20
Attending: EMERGENCY MEDICINE
Payer: MEDICARE

## 2019-10-20 VITALS
HEART RATE: 74 BPM | TEMPERATURE: 98.1 F | WEIGHT: 136 LBS | BODY MASS INDEX: 24.87 KG/M2 | RESPIRATION RATE: 16 BRPM | SYSTOLIC BLOOD PRESSURE: 137 MMHG | DIASTOLIC BLOOD PRESSURE: 67 MMHG | OXYGEN SATURATION: 97 %

## 2019-10-20 DIAGNOSIS — M25.462 KNEE EFFUSION, LEFT: Primary | ICD-10-CM

## 2019-10-20 DIAGNOSIS — M25.562 ACUTE PAIN OF LEFT KNEE: ICD-10-CM

## 2019-10-20 PROCEDURE — L1830 KO IMMOB CANVAS LONG PRE OTS: HCPCS

## 2019-10-20 PROCEDURE — 73562 X-RAY EXAM OF KNEE 3: CPT

## 2019-10-20 PROCEDURE — 99281 EMR DPT VST MAYX REQ PHY/QHP: CPT

## 2019-10-20 RX ORDER — IBUPROFEN 600 MG/1
600 TABLET ORAL
Qty: 20 TAB | Refills: 0 | Status: SHIPPED | OUTPATIENT
Start: 2019-10-20 | End: 2020-11-05

## 2019-10-20 NOTE — ED PROVIDER NOTES
69 y.o. female with past medical history significant for HTN, cataracts, asthma, joint pain, precancerous lesion, and thyroid disease who presents from home via personal vehicle with chief complaint of left knee pain. Patient states while at home yesterday she tripped over her vacuum and landed onto her left knee. Patient reports she had immediate pain but was still able to ambulate. Patient states starting at 12:00AM she had onset of worsening, \"throbbing\" pain, resulting in her taking motrin to try and relieve pain. Patient notes she woke up this morning unable to ambulate without worsening pain to her knee. Patient reports she has a flight to [place] and is worried about the flight. Patient denies head injury or LOC. Patient denies use of anticoagulation. There are no other acute medical concerns at this time. Social hx: Former smoker  PCP: Saundar Schultz MD  Past Medical History:  No date: Asthma  No date: Cataract  No date: Hypertension  No date: Joint pain      Comment:  feet and hips   2003: Precancerous lesion      Comment:  Colon, resection, high risk adenoma upper part of                the descending colon  No date: Thyroid disease      Comment:  hypothyroid  Past Surgical History:  6/20/2016: COLONOSCOPY; N/A      Comment:  COLONOSCOPY performed by Bryn Aguilera MD at [address] [place], [place] [address]way [address]  No date: HX BREAST BIOPSY;  Bilateral      Comment:  negative  No date: HX CATARACT REMOVAL  2003: HX COLECTOMY      Comment:  partial, appr 6\" according to patient, heavily                transformed adenoma  No date: HX COLONOSCOPY      Comment:  partial colon removed   colonscopy q 5 years   No date: HX DILATION AND CURETTAGE      Comment:  X2 after miscarriages  03/01/2018: HX LAPAROSCOPIC SUPRACERVICAL HYSTERECTOMY      Comment:  with BSO/DAVINCI  03/01/2018: HX SACROCOLPOPEXY  No date: HX TONSIL AND ADENOIDECTOMY  01/12/2018: HX UROLOGICAL      Comment:  Urodynamics  03/01/2018: HX UROLOGICAL Comment:  TVT      Note written by Liz Joseph, as dictated by Cassandra Lewis NP      The history is provided by the patient. No  was used.             Past Surgical History:   Procedure Laterality Date    COLONOSCOPY N/A 2016    COLONOSCOPY performed by Kiana Rm MD at [facility] HX BREAST BIOPSY Bilateral     negative    HX CATARACT REMOVAL      HX COLECTOMY      partial, appr 6\" according to patient, heavily transformed adenoma    HX COLONOSCOPY      partial colon removed   colonscopy q 5 years     HX DILATION AND CURETTAGE      X2 after miscarriages    HX LAPAROSCOPIC SUPRACERVICAL HYSTERECTOMY  2018    with BSO/DAVINCI    HX SACROCOLPOPEXY  2018    HX TONSIL AND ADENOIDECTOMY      HX UROLOGICAL  2018    Urodynamics    HX UROLOGICAL  2018    TVT         Family History:   Problem Relation Age of Onset    Cancer Mother         cervical cancer    Stroke Mother         hemorhagic    Diabetes Father     Hypertension Father     Liver Disease Sister         passed 2015       Social History     Socioeconomic History    Marital status:      Spouse name: Not on file    Number of children: Not on file    Years of education: Not on file    Highest education level: Not on file   Occupational History    Not on file   Social Needs    Financial resource strain: Not on file    Food insecurity:     Worry: Not on file     Inability: Not on file    Transportation needs:     Medical: Not on file     Non-medical: Not on file   Tobacco Use    Smoking status: Former Smoker     Last attempt to quit: 1969     Years since quittin.6    Smokeless tobacco: Never Used    Tobacco comment: stopped    Substance and Sexual Activity    Alcohol use: Yes     Binge frequency: Monthly     Comment: very occasionally, social-none in past several weeks    Drug use: No    Sexual activity: Never     Partners: Male Lifestyle    Physical activity:     Days per week: Not on file     Minutes per session: Not on file    Stress: Not on file   Relationships    Social connections:     Talks on phone: Not on file     Gets together: Not on file     Attends Rastafarian service: Not on file     Active member of club or organization: Not on file     Attends meetings of clubs or organizations: Not on file     Relationship status: Not on file    Intimate partner violence:     Fear of current or ex partner: Not on file     Emotionally abused: Not on file     Physically abused: Not on file     Forced sexual activity: Not on file   Other Topics Concern    Not on file   Social History Narrative    Working part time for [employer] company    manageble stress        Not     Children: 2 sons healthy    2 grandchildren healthy so far         ALLERGIES: Patient has no known allergies. Review of Systems   Constitutional: Negative for activity change, appetite change, chills, diaphoresis, fatigue and fever. HENT: Negative for congestion, ear discharge, ear pain, sinus pressure, sinus pain, sore throat and trouble swallowing. Eyes: Negative for photophobia, pain, redness and visual disturbance. Respiratory: Negative for chest tightness, shortness of breath and wheezing. Cardiovascular: Negative for chest pain and palpitations. Gastrointestinal: Negative for abdominal distention, abdominal pain, nausea and vomiting. Endocrine: Negative. Genitourinary: Negative for difficulty urinating, flank pain, frequency, menstrual problem and urgency. Musculoskeletal: Positive for arthralgias and joint swelling. Negative for back pain, neck pain and neck stiffness. Skin: Negative for color change, pallor, rash and wound. Allergic/Immunologic: Negative. Neurological: Negative for dizziness, speech difficulty, weakness and headaches. Hematological: Does not bruise/bleed easily. Psychiatric/Behavioral: Negative for behavioral problems. The patient is not nervous/anxious. Vitals:    10/20/19 0955   BP: 137/67   Pulse: 74   Resp: 16   Temp: 98.1 °F (36.7 °C)   SpO2: 97%   Weight: 61.7 kg (136 lb)            Physical Exam   Constitutional: She is oriented to person, place, and time. She appears well-developed and well-nourished. No distress. HENT:   Head: Normocephalic and atraumatic. Right Ear: External ear normal.   Left Ear: External ear normal.   Nose: Nose normal.   Mouth/Throat: Oropharynx is clear and moist.   Eyes: Pupils are equal, round, and reactive to light. Conjunctivae and EOM are normal. Right eye exhibits no discharge. Left eye exhibits no discharge. Neck: Normal range of motion. Neck supple. No JVD present. No tracheal deviation present. Cardiovascular: Normal rate, regular rhythm, normal heart sounds and intact distal pulses. Exam reveals no gallop. No murmur heard. Pulmonary/Chest: Effort normal and breath sounds normal. No respiratory distress. She has no wheezes. She has no rales. She exhibits no tenderness. Abdominal: Soft. Bowel sounds are normal. She exhibits no distension. There is no tenderness. There is no rebound and no guarding. Genitourinary:   Genitourinary Comments: Negative     Musculoskeletal: Normal range of motion. She exhibits tenderness (left knee pain). She exhibits no edema. Ecchymotic area to right knee. Left knee moderately swollen specifically laterally. No patellar dislocation. Neurological: She is alert and oriented to person, place, and time. Skin: Skin is warm and dry. No rash noted. No erythema. No pallor. Psychiatric: She has a normal mood and affect. Her behavior is normal. Judgment and thought content normal.   Nursing note and vitals reviewed.    Note written by Liz Macdonald, as dictated by Zachary Arriaza NP 10:25 AM       MDM  Number of Diagnoses or Management Options  Acute pain of left knee: new and requires workup  Knee effusion, left: new and requires workup  Diagnosis management comments: DDX:  Knee strain, fracture    Plan:  Discharge to home and follow up with PCP. Return to the ED with worsening symptoms. Patient in agreement with plan of care. Amount and/or Complexity of Data Reviewed  Tests in the radiology section of CPT®: ordered and reviewed           Procedures  10:45 AM  Pt has been reexamined. Pt has no new complaints, changes or physical findings. Care plan outlined and precautions discussed. All available results were reviewed with pt. All medications were reviewed with pt. All of pt's questions and concerns were addressed. Pt agrees to F/U as instructed and agrees to return to ED upon further deterioration. Pt is ready to go home.   Liat Harmon NP

## 2019-10-20 NOTE — DISCHARGE INSTRUCTIONS

## 2019-11-01 ENCOUNTER — OFFICE VISIT (OUTPATIENT)
Dept: INTERNAL MEDICINE CLINIC | Age: 74
End: 2019-11-01

## 2019-11-01 ENCOUNTER — HOSPITAL ENCOUNTER (OUTPATIENT)
Dept: LAB | Age: 74
Discharge: HOME | End: 2019-11-01
Payer: MEDICARE

## 2019-11-01 VITALS
DIASTOLIC BLOOD PRESSURE: 61 MMHG | HEIGHT: 62 IN | BODY MASS INDEX: 25.47 KG/M2 | TEMPERATURE: 97.9 F | RESPIRATION RATE: 18 BRPM | OXYGEN SATURATION: 99 % | SYSTOLIC BLOOD PRESSURE: 111 MMHG | WEIGHT: 138.38 LBS | HEART RATE: 56 BPM

## 2019-11-01 DIAGNOSIS — M81.0 AGE RELATED OSTEOPOROSIS, UNSPECIFIED PATHOLOGICAL FRACTURE PRESENCE: ICD-10-CM

## 2019-11-01 DIAGNOSIS — Z13.39 SCREENING FOR ALCOHOLISM: ICD-10-CM

## 2019-11-01 DIAGNOSIS — Z00.00 MEDICARE ANNUAL WELLNESS VISIT, SUBSEQUENT: Primary | ICD-10-CM

## 2019-11-01 DIAGNOSIS — E55.9 VITAMIN D DEFICIENCY: ICD-10-CM

## 2019-11-01 DIAGNOSIS — I10 ESSENTIAL HYPERTENSION: ICD-10-CM

## 2019-11-01 DIAGNOSIS — Z71.89 ADVANCED DIRECTIVES, COUNSELING/DISCUSSION: ICD-10-CM

## 2019-11-01 DIAGNOSIS — Z13.31 SCREENING FOR DEPRESSION: ICD-10-CM

## 2019-11-01 DIAGNOSIS — Z12.11 SCREEN FOR COLON CANCER: ICD-10-CM

## 2019-11-01 DIAGNOSIS — R53.83 FATIGUE, UNSPECIFIED TYPE: ICD-10-CM

## 2019-11-01 DIAGNOSIS — E03.9 ACQUIRED HYPOTHYROIDISM: ICD-10-CM

## 2019-11-01 PROCEDURE — 80061 LIPID PANEL: CPT

## 2019-11-01 PROCEDURE — 84443 ASSAY THYROID STIM HORMONE: CPT

## 2019-11-01 PROCEDURE — 82306 VITAMIN D 25 HYDROXY: CPT

## 2019-11-01 PROCEDURE — 36415 COLL VENOUS BLD VENIPUNCTURE: CPT

## 2019-11-01 PROCEDURE — 80053 COMPREHEN METABOLIC PANEL: CPT

## 2019-11-01 PROCEDURE — 84439 ASSAY OF FREE THYROXINE: CPT

## 2019-11-01 PROCEDURE — 81003 URINALYSIS AUTO W/O SCOPE: CPT

## 2019-11-01 PROCEDURE — 85025 COMPLETE CBC W/AUTO DIFF WBC: CPT

## 2019-11-01 RX ORDER — MELOXICAM 15 MG/1
15 TABLET ORAL AS NEEDED
COMMUNITY
End: 2020-11-05

## 2019-11-01 NOTE — PROGRESS NOTES
This is the Subsequent Medicare Annual Wellness Exam, performed 12 months or more after the Initial AWV or the last Subsequent AWV    I have reviewed the patient's medical history in detail and updated the computerized patient record. History     Patient Active Problem List   Diagnosis Code    Advanced care planning/counseling discussion Z71.89    Cystocele, midline N81.11    Cystocele with rectocele N81.10, N81.6     Past Medical History:   Diagnosis Date    Asthma     Cataract     Hypertension     Joint pain     feet and hips     Precancerous lesion 2003    Colon, resection - high risk adenoma upper part of the descending colon    Thyroid disease     hypothyroid      Past Surgical History:   Procedure Laterality Date    COLONOSCOPY N/A 6/20/2016    COLONOSCOPY performed by Moncho Cardona MD at [address] [facility] HX BREAST BIOPSY Bilateral     negative    HX CATARACT REMOVAL      HX COLECTOMY  2003    partial, appr 6\" according to patient, heavily transformed adenoma    HX COLONOSCOPY      partial colon removed   colonscopy q 5 years     HX DILATION AND CURETTAGE      X2 after miscarriages    HX LAPAROSCOPIC SUPRACERVICAL HYSTERECTOMY  03/01/2018    with BSO/DAVINCI    HX SACROCOLPOPEXY  03/01/2018    HX TONSIL AND ADENOIDECTOMY      HX UROLOGICAL  01/12/2018    Urodynamics    HX UROLOGICAL  03/01/2018    TVT     Current Outpatient Medications   Medication Sig Dispense Refill    vit C/E/Zn/coppr/lutein/zeaxan (PRESERVISION AREDS-2 PO) Take  by mouth.  meloxicam (MOBIC) 15 mg tablet Take 15 mg by mouth as needed for Pain.  losartan (COZAAR) 25 mg tablet TAKE 1 TABLET DAILY 90 Tab 4    albuterol (PROVENTIL HFA, VENTOLIN HFA, PROAIR HFA) 90 mcg/actuation inhaler Take 2 Puffs by inhalation every four (4) hours as needed for Wheezing or Shortness of Breath. 1 Inhaler 3    levothyroxine (SYNTHROID) 112 mcg tablet TAKE 1 TABLET DAILY BEFORE BREAKFAST FOR HYPOTHYROIDISM.  Labs please and appt 30 Tab 0    calcium carbonate (CALCIUM 300 PO) Take  by mouth.  alendronate (FOSAMAX) 70 mg tablet Take 1 Tab by mouth every seven (7) days. 12 Tab 3    polyethylene glycol (MIRALAX) 17 gram/dose powder TK 17 GRAMS DISSOLVED IN WATER ONCE A DAY  6    aspirin delayed-release 81 mg tablet Take  by mouth daily.  calcium-cholecalciferol, d3, 600-125 mg-unit tab Take  by mouth.  ibuprofen (MOTRIN) 600 mg tablet Take 1 Tab by mouth every six (6) hours as needed for Pain. 20 Tab 0    FOLIC ACID/MULTIVIT-MIN/LUTEIN (CENTRUM SILVER PO) Take  by mouth as needed. No Known Allergies    Family History   Problem Relation Age of Onset    Cancer Mother         cervical cancer    Stroke Mother         hemorhagic    Diabetes Father     Hypertension Father     Liver Disease Sister         passed 2015    No Known Problems Son     No Known Problems Son      Social History     Tobacco Use    Smoking status: Former Smoker     Last attempt to quit: 1969     Years since quittin.7    Smokeless tobacco: Never Used    Tobacco comment: 1969   Substance Use Topics    Alcohol use: Yes     Binge frequency: Monthly     Comment: very occasionally, social-none in past several weeks       Depression Risk Factor Screening:     3 most recent PHQ Screens 2019   Little interest or pleasure in doing things Not at all   Feeling down, depressed, irritable, or hopeless Not at all   Total Score PHQ 2 0       Alcohol Risk Factor Screening:   Do you average 1 drink per night or more than 7 drinks a week:  No    On any one occasion in the past three months have you have had more than 3 drinks containing alcohol:  No      Functional Ability and Level of Safety:   Hearing: Hearing is good. Activities of Daily Living: The home contains: no safety equipment.   Patient does total self care    Ambulation: with no difficulty    Fall Risk:  Fall Risk Assessment, last 12 mths 2019   Able to walk? -   Fall in past 12 months? Yes   Fall with injury? Yes   Number of falls in past 12 months 1   Fall Risk Score 2       Abuse Screen:  Patient is not abused    Cognitive Screening   Has your family/caregiver stated any concerns about your memory: no  Cognitive Screening: Normal - MMSE (Mini Mental Status Exam)    Patient Care Team   Patient Care Team:  Stas Avery MD as PCP - General (Internal Medicine)  Stas Avery MD as PCP - [facility] Empaneled Provider    Assessment/Plan   Education and counseling provided:  Are appropriate based on today's review and evaluation  End-of-Life planning (with patient's consent)  Screening Mammography  Colorectal cancer screening tests  Cardiovascular screening blood test  Bone mass measurement (DEXA)    Diagnoses and all orders for this visit:    1. Medicare annual wellness visit, subsequent    2. Screening for alcoholism  -     OK ANNUAL ALCOHOL SCREEN 15 MIN    3. Screening for depression  -     DEPRESSION SCREEN ANNUAL    4. Screen for colon cancer  -     OCCULT BLOOD IMMUNOASSAY,DIAGNOSTIC    5. Essential hypertension  -     METABOLIC PANEL, COMPREHENSIVE  -     LIPID PANEL  -     URINALYSIS W/ RFLX MICROSCOPIC    6. Acquired hypothyroidism  -     TSH 3RD GENERATION  -     T4, FREE    7. Age related osteoporosis, unspecified pathological fracture presence  -     VITAMIN D, 25 HYDROXY    8. Vitamin D deficiency  -     VITAMIN D, 25 HYDROXY    9. Fatigue, unspecified type  -     CBC WITH AUTOMATED DIFF  -     METABOLIC PANEL, COMPREHENSIVE  -     TSH 3RD GENERATION  -     T4, FREE    10. Advanced directives, counseling/discussion    Other orders  -     CVD REPORT        Health Maintenance Due   Topic Date Due    GLAUCOMA SCREENING Q2Y  04/21/2019     HISTORY OF PRESENT ILLNESS  Heather Medina is a 69 y.o. female. HPI  Presents for Annual wellness visit and follow up chronic issues.     Last mammogram 7/2019  Colonoscopy 6/2016 -- due 2021  DEXA scan 10/2018    Subjective:   Joaquin Martin is a 69 y.o. female with hypertension. Hypertension ROS: taking medications as instructed, no medication side effects noted, no TIA's, no chest pain on exertion, no dyspnea on exertion, no swelling of ankles. New concerns: blood pressure has been controlled with low dose Losartan. SUBJECTIVE: Joaquin Martin is a 69 y.o. female here for follow up of hypothyroidism. Lab Results   Component Value Date/Time    TSH 4.590 (H) 11/01/2019 10:59 AM     Thyroid ROS: denies fatigue, weight changes, heat/cold intolerance, bowel/skin changes or CVS symptoms. She is currently taking weekly Fosamax 70 mg for osteoporosis as seen on DEXA scan in 10/2018. Exercises daily with walking and light weight training. Has been Vitamin D deficient in the past and is currently taking Calcium and Vitamin D supplements. Review of Systems   Constitutional: Positive for malaise/fatigue. Negative for chills and fever. HENT: Negative for congestion, sinus pain and sore throat. Respiratory: Negative for cough and shortness of breath. Cardiovascular: Negative for chest pain and palpitations. Gastrointestinal: Negative for abdominal pain, blood in stool, constipation, diarrhea, heartburn, nausea and vomiting. Genitourinary: Negative for dysuria, frequency and hematuria. Neurological: Negative for tingling and headaches. Psychiatric/Behavioral: Negative for depression. The patient is not nervous/anxious and does not have insomnia. /61 (BP 1 Location: Left arm, BP Patient Position: Sitting)   Pulse (!) 56   Temp 97.9 °F (36.6 °C) (Oral)   Resp 18   Ht 5' 2\" (1.575 m)   Wt 138 lb 6 oz (62.8 kg)   SpO2 99%   BMI 25.31 kg/m²   Physical Exam   Constitutional: She is oriented to person, place, and time. She appears well-developed and well-nourished. HENT:   Head: Normocephalic and atraumatic.    Right Ear: External ear normal.   Left Ear: External ear normal.   Nose: Nose normal.   Mouth/Throat: Oropharynx is clear and moist.   Neck: Normal range of motion. Neck supple. No thyromegaly present. Cardiovascular: Normal rate, regular rhythm and normal heart sounds. Pulmonary/Chest: Effort normal and breath sounds normal. She has no wheezes. She has no rales. Abdominal: Soft. Bowel sounds are normal. There is no tenderness. There is no rebound and no guarding. Musculoskeletal: Normal range of motion. She exhibits no edema. Lymphadenopathy:     She has no cervical adenopathy. Neurological: She is alert and oriented to person, place, and time. No cranial nerve deficit. Skin: Skin is warm and dry. Psychiatric: She has a normal mood and affect. Her behavior is normal.   Nursing note and vitals reviewed. ASSESSMENT and PLAN  Diagnoses and all orders for this visit:    1. Medicare annual wellness visit, subsequent    2. Screening for alcoholism  -     [place] ANNUAL ALCOHOL SCREEN 15 MIN    3. Screening for depression  -     DEPRESSION SCREEN ANNUAL    4. Screen for colon cancer  -     OCCULT BLOOD IMMUNOASSAY,DIAGNOSTIC    5. Essential hypertension  -     METABOLIC PANEL, COMPREHENSIVE  -     LIPID PANEL  -     URINALYSIS W/ RFLX MICROSCOPIC    6. Acquired hypothyroidism  -     TSH 3RD GENERATION  -     T4, FREE    7. Age related osteoporosis, unspecified pathological fracture presence  -     VITAMIN D, 25 HYDROXY    8. Vitamin D deficiency  -     VITAMIN D, 25 HYDROXY    9. Fatigue, unspecified type  -     CBC WITH AUTOMATED DIFF  -     METABOLIC PANEL, COMPREHENSIVE  -     TSH 3RD GENERATION  -     T4, FREE    10.  Advanced directives, counseling/discussion    Other orders  -     CVD REPORT      lab results and schedule of future lab studies reviewed with patient  reviewed diet, exercise and weight control  cardiovascular risk and specific lipid/LDL goals reviewed  reviewed medications and side effects in detail  radiology results and schedule of future radiology studies reviewed with patient

## 2019-11-01 NOTE — PATIENT INSTRUCTIONS
Medicare Wellness Visit, Female     The best way to live healthy is to have a lifestyle where you eat a well-balanced diet, exercise regularly, limit alcohol use, and quit all forms of tobacco/nicotine, if applicable. Regular preventive services are another way to keep healthy. Preventive services (vaccines, screening tests, monitoring & exams) can help personalize your care plan, which helps you manage your own care. Screening tests can find health problems at the earliest stages, when they are easiest to treat. [facility] follows the current, evidence-based guidelines published by the [facility] ([facility]) when recommending preventive services for our patients. Because we follow these guidelines, sometimes recommendations change over time as research supports it. (For example, mammograms used to be recommended annually. Even though Medicare will still pay for an annual mammogram, the newer guidelines recommend a mammogram every two years for women of average risk). Of course, you and your doctor may decide to screen more often for some diseases, based on your risk and your co-morbidities (chronic disease you are already diagnosed with). Preventive services for you include:  - Medicare offers their members a free annual wellness visit, which is time for you and your primary care provider to discuss and plan for your preventive service needs. Take advantage of this benefit every year!  -All adults over the age of 72 should receive the recommended pneumonia vaccines. Current USPSTF guidelines recommend a series of two vaccines for the best pneumonia protection.   -All adults should have a flu vaccine yearly and a tetanus vaccine every 10 years.   -All adults age 48 and older should receive the shingles vaccines (series of two vaccines).       -All adults age 38-68 who are overweight should have a diabetes screening test once every three years.   -All adults born between [birth year] and 1965 should be screened once for Hepatitis C.  -Other screening tests and preventive services for persons with diabetes include: an eye exam to screen for diabetic retinopathy, a kidney function test, a foot exam, and stricter control over your cholesterol.   -Cardiovascular screening for adults with routine risk involves an electrocardiogram (ECG) at intervals determined by your doctor.   -Colorectal cancer screenings should be done for adults age 54-65 with no increased risk factors for colorectal cancer. There are a number of acceptable methods of screening for this type of cancer. Each test has its own benefits and drawbacks. Discuss with your doctor what is most appropriate for you during your annual wellness visit. The different tests include: colonoscopy (considered the best screening method), a fecal occult blood test, a fecal DNA test, and sigmoidoscopy.    -A bone mass density test is recommended when a woman turns 65 to screen for osteoporosis. This test is only recommended one time, as a screening. Some providers will use this same test as a disease monitoring tool if you already have osteoporosis. -Breast cancer screenings are recommended every other year for women of normal risk, age 54-69.  -Cervical cancer screenings for women over age 72 are only recommended with certain risk factors. Here is a list of your current Health Maintenance items (your personalized list of preventive services) with a due date:  Health Maintenance Due   Topic Date Due    Shingles Vaccine (1 of 2) 10/08/1997    Pneumococcal Vaccine (2 of 2 - PPSV23) 10/25/2018    Glaucoma Screening   04/21/2019    Flu Vaccine  08/01/2019    Annual Well Visit  10/26/2019            Colon Cancer Screening: Care Instructions  Your Care Instructions    Colorectal cancer occurs in the colon or rectum. That's the lower part of your digestive system.  It is the second-leading cause of cancer deaths in the [place]. It often starts with small growths called polyps in the colon or rectum. Polyps are usually found with screening tests. Depending on the type of test, any polyps found may be removed during the tests. Colorectal cancer usually does not cause symptoms at first. But regular tests can help find it early, before it spreads and becomes harder to treat. Your risk for colorectal cancer gets higher as you get older. Some experts say that adults should start regular screening at age 48 and stop at age 76. Others say to start before age 48 or continue after age 76. Talk with your doctor about your risk and when to start and stop screening. You may have one of several tests. Follow-up care is a key part of your treatment and safety. Be sure to make and go to all appointments, and call your doctor if you are having problems. It's also a good idea to know your test results and keep a list of the medicines you take. What are the main screening tests for colon cancer? · Stool tests. These include the fecal immunochemical test (FIT) and the fecal occult blood test (FOBT). These tests check stool samples for signs of cancer. If your test is positive, you will need to have a colonoscopy. · Sigmoidoscopy. This test lets your doctor look at the lining of your rectum and the lowest part of your colon. Your doctor uses a lighted tube called a sigmoidoscope. This test can't find cancers or polyps in the upper part of your colon. In some cases, polyps that are found can be removed. But if your doctor finds polyps, you will need to have a colonoscopy to check the upper part of your colon. · Colonoscopy. This test lets your doctor look at the lining of your rectum and your entire colon. The doctor uses a thin, flexible tool called a colonoscope. It can also be used to remove polyps or get a tissue sample (biopsy). What tests do you need?   The following guidelines are for adults who are not at high risk for colorectal cancer. You may have at least one of these tests as directed by your doctor. · Fecal immunochemical test (FIT) or guaiac fecal occult blood test (gFOBT) every year  · Sigmoidoscopy every 5 years  · Colonoscopy every 10 years  If you are over age 76, you can work with your doctor to decide if screening is a good option. Talk with your doctor about when you need to be tested. And discuss which tests are right for you. Your doctor may recommend earlier or more frequent testing if you:  · Have had colorectal cancer before. · Have had colon polyps. · Have symptoms of colorectal cancer. These include blood in your stool and changes in your bowel habits. · Have a parent, brother or sister, or child with colon polyps or colorectal cancer. · Have a bowel disease. This includes ulcerative colitis and Crohn's disease. · Have a rare polyp syndrome that runs in families, such as familial adenomatous polyposis (FAP). · Have had radiation treatments to the belly or pelvis. When should you call for help? Watch closely for changes in your health, and be sure to contact your doctor if:    · You have any changes in your bowel habits.     · You have any problems. Where can you learn more? Go to http://carl-fozia.info/. Enter M541 in the search box to learn more about \"Colon Cancer Screening: Care Instructions. \"  Current as of: December 19, 2018  Content Version: 12.2  © 0888-4059 [employer], Inc[employer]. Care instructions adapted under license by [employer] (which disclaims liability or warranty for this information). If you have questions about a medical condition or this instruction, always ask your healthcare professional. [place] [address] any warranty or liability for your use of this information. Osteoporosis: Care Instructions  Your Care Instructions    Osteoporosis causes bones to become thin and weak. It is much more common in women than in men. Osteoporosis may be very advanced before you know you have it. Sometimes the first sign is a broken bone in the hip, spine, or wrist or sudden pain in your middle or lower back. Follow-up care is a key part of your treatment and safety. Be sure to make and go to all appointments, and call your doctor if you are having problems. It's also a good idea to know your test results and keep a list of the medicines you take. How can you care for yourself at home? · Your doctor may prescribe a bisphosphonate, such as risedronate (Actonel) or alendronate (Fosamax), for osteoporosis. If you are taking one of these medicines by mouth:  ? Take your medicine with a full glass of water when you first get up in the morning. ? Do not lie down, eat, drink a beverage, or take any other medicine for at least 30 minutes after taking the drug. This helps prevent stomach problems. ? Do not take your medicine late in the day if you forgot to take it in the morning. Skip it, and take the usual dose the next morning. ? If you have side effects, tell your doctor. He or she may prescribe another medicine. · Get enough calcium and vitamin D. The [place] [employer] recommends adults younger than age 46 need 1,000 mg of calcium and 600 IU of vitamin D each day. Women ages 46 to 79 need 1,200 mg of calcium and 600 IU of vitamin D each day. Men ages 46 to 79 need 1,000 mg of calcium and 600 IU of vitamin D each day. Adults 71 and older need 1,200 mg of calcium and 800 IU of vitamin D each day. ? Eat foods rich in calcium, like yogurt, cheese, milk, and dark green vegetables. This is a good way to get the calcium you need. You can get vitamin D from eggs, fatty fish, cereal, and milk. ? Talk to your doctor about taking a calcium plus vitamin D supplement. Be careful, though. Adults ages 23 to 48 should not get more than 2,500 mg of calcium and 4,000 IU of vitamin D each day, whether it is from supplements and/or food.  Adults ages 46 and older should not get more than 2,000 mg of calcium and 4,000 IU of vitamin D each day from supplements and/or food. · Limit alcohol to 2 drinks a day for men and 1 drink a day for women. Too much alcohol can cause health problems. · Do not smoke. Smoking puts you at a much higher risk for osteoporosis. If you need help quitting, talk to your doctor about stop-smoking programs and medicines. These can increase your chances of quitting for good. · Get regular bone-building exercise. Weight-bearing and resistance exercises keep bones healthy by working the muscles and bones against gravity. Start out at an exercise level that feels right for you. Add a little at a time until you can do the following:  ? Do 30 minutes of weight-bearing exercise on most days of the week. Walking, jogging, stair climbing, and dancing are good choices. ? Do resistance exercises with weights or elastic bands 2 to 3 days a week. · Reduce your risk of falls:  ? Wear supportive shoes with low heels and nonslip soles. ? Use a cane or walker, if you need it. Use shower chairs and bath benches. Put in handrails on stairways, around your shower or tub area, and near the toilet. ? Keep stairs, porches, and walkways well lit. Use night-lights. ? Remove throw rugs and other objects that are in the way. ? Avoid icy, wet, or slippery surfaces. ? Keep a cordless phone and a flashlight with new batteries by your bed. When should you call for help? Watch closely for changes in your health, and be sure to contact your doctor if you have any problems. Where can you learn more? Go to http://carl-fozia.info/. Enter K100 in the search box to learn more about \"Osteoporosis: Care Instructions. \"  Current as of: November 7, 2018  Content Version: 12.2  © 8755-2107 [employer]. Care instructions adapted under license by [employer] (which disclaims liability or warranty for this information).  If you have questions about a medical condition or this instruction, always ask your healthcare professional. [address] any warranty or liability for your use of this information. Well Visit, Over 72: Care Instructions  Your Care Instructions    Physical exams can help you stay healthy. Your doctor has checked your overall health and may have suggested ways to take good care of yourself. He or she also may have recommended tests. At home, you can help prevent illness with healthy eating, regular exercise, and other steps. Follow-up care is a key part of your treatment and safety. Be sure to make and go to all appointments, and call your doctor if you are having problems. It's also a good idea to know your test results and keep a list of the medicines you take. How can you care for yourself at home? · Reach and stay at a healthy weight. This will lower your risk for many problems, such as obesity, diabetes, heart disease, and high blood pressure. · Get at least 30 minutes of exercise on most days of the week. Walking is a good choice. You also may want to do other activities, such as running, swimming, cycling, or playing tennis or team sports. · Do not smoke. Smoking can make health problems worse. If you need help quitting, talk to your doctor about stop-smoking programs and medicines. These can increase your chances of quitting for good. · Protect your skin from too much sun. When you're outdoors from 10 a.m. to 4 p.m., stay in the shade or cover up with clothing and a hat with a wide brim. Wear sunglasses that block UV rays. Even when it's cloudy, put broad-spectrum sunscreen (SPF 30 or higher) on any exposed skin. · See a dentist one or two times a year for checkups and to have your teeth cleaned. · Wear a seat belt in the car. Follow your doctor's advice about when to have certain tests. These tests can spot problems early. For men and women  · Cholesterol.  Your doctor will tell you how often to have this done based on your overall health and other things that can increase your risk for heart attack and stroke. · Blood pressure. Have your blood pressure checked during a routine doctor visit. Your doctor will tell you how often to check your blood pressure based on your age, your blood pressure results, and other factors. · Diabetes. Ask your doctor whether you should have tests for diabetes. · Vision. Experts recommend that you have yearly exams for glaucoma and other age-related eye problems. · Hearing. Tell your doctor if you notice any change in your hearing. You can have tests to find out how well you hear. · Colon cancer tests. Keep having colon cancer tests as your doctor recommends. You can have one of several types of tests. · Heart attack and stroke risk. At least every 4 to 6 years, you should have your risk for heart attack and stroke assessed. Your doctor uses factors such as your age, blood pressure, cholesterol, and whether you smoke or have diabetes to show what your risk for a heart attack or stroke is over the next 10 years. · Osteoporosis. Talk to your doctor about whether you should have a bone density test to find out whether you have thinning bones. Also ask your doctor about whether you should take calcium and vitamin D supplements. For women  · Pap test and pelvic exam. You may no longer need a Pap test. Talk with your doctor about whether to stop or continue to have Pap tests. · Breast exam and mammogram. Ask how often you should have a mammogram, which is an X-ray of your breasts. A mammogram can spot breast cancer before it can be felt and when it is easiest to treat. · Thyroid disease. Talk to your doctor about whether to have your thyroid checked as part of a regular physical exam. Women have an increased chance of a thyroid problem.   For men  · Prostate exam. Talk to your doctor about whether you should have a blood test (called a PSA test) for prostate cancer. Experts recommend that you discuss the benefits and risks of the test with your doctor before you decide whether to have this test. Some experts say that men ages 79 and older no longer need testing. · Abdominal aortic aneurysm. Ask your doctor whether you should have a test to check for an aneurysm. You may need a test if you ever smoked or if your parent, brother, sister, or child has had an aneurysm. When should you call for help? Watch closely for changes in your health, and be sure to contact your doctor if you have any problems or symptoms that concern you. Where can you learn more? Go to http://carl-fozia.info/. Enter T466 in the search box to learn more about \"Well Visit, Over 65: Care Instructions. \"  Current as of: December 13, 2018  Content Version: 12.2  © 6040-1505 [employer]. Care instructions adapted under license by [employer] (which disclaims liability or warranty for this information). If you have questions about a medical condition or this instruction, always ask your healthcare professional. [place] [address] any warranty or liability for your use of this information. Medicare Wellness Visit, Female     The best way to live healthy is to have a lifestyle where you eat a well-balanced diet, exercise regularly, limit alcohol use, and quit all forms of tobacco/nicotine, if applicable. Regular preventive services are another way to keep healthy. Preventive services (vaccines, screening tests, monitoring & exams) can help personalize your care plan, which helps you manage your own care. Screening tests can find health problems at the earliest stages, when they are easiest to treat. [facility] follows the current, evidence-based guidelines published by the [place] [facility] (USPSTF) when recommending preventive services for our patients.  Because we follow these guidelines, sometimes recommendations change over time as research supports it. (For example, mammograms used to be recommended annually. Even though Medicare will still pay for an annual mammogram, the newer guidelines recommend a mammogram every two years for women of average risk). Of course, you and your doctor may decide to screen more often for some diseases, based on your risk and your co-morbidities (chronic disease you are already diagnosed with). Preventive services for you include:  - Medicare offers their members a free annual wellness visit, which is time for you and your primary care provider to discuss and plan for your preventive service needs. Take advantage of this benefit every year!  -All adults over the age of 72 should receive the recommended pneumonia vaccines. Current USPSTF guidelines recommend a series of two vaccines for the best pneumonia protection.   -All adults should have a flu vaccine yearly and a tetanus vaccine every 10 years.   -All adults age 48 and older should receive the shingles vaccines (series of two vaccines). -All adults age 38-68 who are overweight should have a diabetes screening test once every three years.   -All adults born between [birth year] and 1965 should be screened once for Hepatitis C.  -Other screening tests and preventive services for persons with diabetes include: an eye exam to screen for diabetic retinopathy, a kidney function test, a foot exam, and stricter control over your cholesterol.   -Cardiovascular screening for adults with routine risk involves an electrocardiogram (ECG) at intervals determined by your doctor.   -Colorectal cancer screenings should be done for adults age 54-65 with no increased risk factors for colorectal cancer. There are a number of acceptable methods of screening for this type of cancer. Each test has its own benefits and drawbacks.  Discuss with your doctor what is most appropriate for you during your annual wellness visit. The different tests include: colonoscopy (considered the best screening method), a fecal occult blood test, a fecal DNA test, and sigmoidoscopy.    -A bone mass density test is recommended when a woman turns 65 to screen for osteoporosis. This test is only recommended one time, as a screening. Some providers will use this same test as a disease monitoring tool if you already have osteoporosis. -Breast cancer screenings are recommended every other year for women of normal risk, age 54-69.  -Cervical cancer screenings for women over age 72 are only recommended with certain risk factors.      Here is a list of your current Health Maintenance items (your personalized list of preventive services) with a due date:  Health Maintenance Due   Topic Date Due    Glaucoma Screening   04/21/2019

## 2019-11-02 LAB
25(OH)D3+25(OH)D2 SERPL-MCNC: 41.2 NG/ML (ref 30–100)
ALBUMIN SERPL-MCNC: 4.7 G/DL (ref 3.5–4.8)
ALBUMIN/GLOB SERPL: 2.4 {RATIO} (ref 1.2–2.2)
ALP SERPL-CCNC: 79 IU/L (ref 39–117)
ALT SERPL-CCNC: 16 IU/L (ref 0–32)
APPEARANCE UR: CLEAR
AST SERPL-CCNC: 24 IU/L (ref 0–40)
BASOPHILS # BLD AUTO: 0.1 X10E3/UL (ref 0–0.2)
BASOPHILS NFR BLD AUTO: 1 %
BILIRUB SERPL-MCNC: 0.8 MG/DL (ref 0–1.2)
BILIRUB UR QL STRIP: NEGATIVE
BUN SERPL-MCNC: 12 MG/DL (ref 8–27)
BUN/CREAT SERPL: 18 (ref 12–28)
CALCIUM SERPL-MCNC: 9.6 MG/DL (ref 8.7–10.3)
CHLORIDE SERPL-SCNC: 102 MMOL/L (ref 96–106)
CHOLEST SERPL-MCNC: 185 MG/DL (ref 100–199)
CO2 SERPL-SCNC: 22 MMOL/L (ref 20–29)
COLOR UR: YELLOW
CREAT SERPL-MCNC: 0.66 MG/DL (ref 0.57–1)
EOSINOPHIL # BLD AUTO: 0.1 X10E3/UL (ref 0–0.4)
EOSINOPHIL NFR BLD AUTO: 3 %
ERYTHROCYTE [DISTWIDTH] IN BLOOD BY AUTOMATED COUNT: 12.4 % (ref 12.3–15.4)
GLOBULIN SER CALC-MCNC: 2 G/DL (ref 1.5–4.5)
GLUCOSE SERPL-MCNC: 94 MG/DL (ref 65–99)
GLUCOSE UR QL: NEGATIVE
HCT VFR BLD AUTO: 37.2 % (ref 34–46.6)
HDLC SERPL-MCNC: 71 MG/DL
HGB BLD-MCNC: 13.3 G/DL (ref 11.1–15.9)
HGB UR QL STRIP: NEGATIVE
IMM GRANULOCYTES # BLD AUTO: 0 X10E3/UL (ref 0–0.1)
IMM GRANULOCYTES NFR BLD AUTO: 0 %
INTERPRETATION, 910389: NORMAL
KETONES UR QL STRIP: NEGATIVE
LDLC SERPL CALC-MCNC: 100 MG/DL (ref 0–99)
LEUKOCYTE ESTERASE UR QL STRIP: NEGATIVE
LYMPHOCYTES # BLD AUTO: 1.5 X10E3/UL (ref 0.7–3.1)
LYMPHOCYTES NFR BLD AUTO: 35 %
MCH RBC QN AUTO: 32.5 PG (ref 26.6–33)
MCHC RBC AUTO-ENTMCNC: 35.8 G/DL (ref 31.5–35.7)
MCV RBC AUTO: 91 FL (ref 79–97)
MICRO URNS: ABNORMAL
MONOCYTES # BLD AUTO: 0.3 X10E3/UL (ref 0.1–0.9)
MONOCYTES NFR BLD AUTO: 7 %
NEUTROPHILS # BLD AUTO: 2.3 X10E3/UL (ref 1.4–7)
NEUTROPHILS NFR BLD AUTO: 54 %
NITRITE UR QL STRIP: NEGATIVE
PH UR STRIP: 8.5 [PH] (ref 5–7.5)
PLATELET # BLD AUTO: 276 X10E3/UL (ref 150–450)
POTASSIUM SERPL-SCNC: 5.1 MMOL/L (ref 3.5–5.2)
PROT SERPL-MCNC: 6.7 G/DL (ref 6–8.5)
PROT UR QL STRIP: NEGATIVE
RBC # BLD AUTO: 4.09 X10E6/UL (ref 3.77–5.28)
SODIUM SERPL-SCNC: 141 MMOL/L (ref 134–144)
SP GR UR: 1.02 (ref 1–1.03)
T4 FREE SERPL-MCNC: 1.57 NG/DL (ref 0.82–1.77)
TRIGL SERPL-MCNC: 69 MG/DL (ref 0–149)
TSH SERPL DL<=0.005 MIU/L-ACNC: 4.59 UIU/ML (ref 0.45–4.5)
UROBILINOGEN UR STRIP-MCNC: 0.2 MG/DL (ref 0.2–1)
VLDLC SERPL CALC-MCNC: 14 MG/DL (ref 5–40)
WBC # BLD AUTO: 4.2 X10E3/UL (ref 3.4–10.8)

## 2019-11-03 ENCOUNTER — HOSPITAL ENCOUNTER (OUTPATIENT)
Dept: LAB | Age: 74
Discharge: HOME | End: 2019-11-03
Payer: MEDICARE

## 2019-11-03 DIAGNOSIS — E03.9 ACQUIRED HYPOTHYROIDISM: Primary | ICD-10-CM

## 2019-11-03 PROCEDURE — 82270 OCCULT BLOOD FECES: CPT

## 2019-11-03 NOTE — ACP (ADVANCE CARE PLANNING)
Advance Care Planning (ACP) Provider Conversation Snapshot    Date of ACP Conversation: 11/03/19  Persons included in Conversation:  patient  Length of ACP Conversation in minutes:  <16 minutes (Non-Billable)    Authorized Decision Maker (if patient is incapable of making informed decisions): This person is:   Healthcare Agent/Medical Power of  under Advance Directive            For Patients with Decision Making Capacity:   Values/Goals: Exploration of values, goals, and preferences if recovery is not expected, even with continued medical treatment in the event of:  Imminent death  Severe, permanent brain injury  \"In these circumstances, what matters most to you? \"  Care focused more on comfort or quality of life.     Conversation Outcomes / Follow-Up Plan:   Recommended completion of Advance Directive form after review of ACP materials and conversation with prospective healthcare agent   Recommended communicating the plan and making copies for the healthcare agent, personal physician, and others as appropriate (e.g., health system)  Recommended review of completed ACP document annually or upon change in health status  Other Treatment or Goals of Care Decisions:

## 2019-11-09 LAB — HEMOCCULT STL QL IA: NEGATIVE

## 2020-01-30 ENCOUNTER — HOSPITAL ENCOUNTER (OUTPATIENT)
Dept: LAB | Age: 75
Discharge: HOME | End: 2020-01-30
Payer: MEDICARE

## 2020-01-30 PROCEDURE — 84439 ASSAY OF FREE THYROXINE: CPT

## 2020-01-30 PROCEDURE — 36415 COLL VENOUS BLD VENIPUNCTURE: CPT

## 2020-01-30 PROCEDURE — 84443 ASSAY THYROID STIM HORMONE: CPT

## 2020-01-31 LAB
T4 FREE SERPL-MCNC: 1.75 NG/DL (ref 0.82–1.77)
TSH SERPL DL<=0.005 MIU/L-ACNC: 4.96 UIU/ML (ref 0.45–4.5)

## 2020-02-03 ENCOUNTER — PATIENT MESSAGE (OUTPATIENT)
Dept: INTERNAL MEDICINE CLINIC | Age: 75
End: 2020-02-03

## 2020-02-03 DIAGNOSIS — E07.9 THYROID DISORDER: ICD-10-CM

## 2020-02-04 RX ORDER — LEVOTHYROXINE SODIUM 112 UG/1
TABLET ORAL
Qty: 30 TAB | Refills: 12 | Status: SHIPPED | OUTPATIENT
Start: 2020-02-04 | End: 2021-02-01

## 2020-02-13 DIAGNOSIS — M81.0 AGE-RELATED OSTEOPOROSIS WITHOUT CURRENT PATHOLOGICAL FRACTURE: ICD-10-CM

## 2020-02-13 RX ORDER — ALENDRONATE SODIUM 70 MG/1
TABLET ORAL
Qty: 12 TAB | Refills: 4 | Status: SHIPPED | OUTPATIENT
Start: 2020-02-13 | End: 2021-03-18

## 2020-07-17 ENCOUNTER — HOSPITAL ENCOUNTER (OUTPATIENT)
Dept: MAMMOGRAPHY | Age: 75
Discharge: HOME | End: 2020-07-17
Attending: INTERNAL MEDICINE
Payer: MEDICARE

## 2020-07-17 DIAGNOSIS — Z12.31 VISIT FOR SCREENING MAMMOGRAM: ICD-10-CM

## 2020-07-17 PROCEDURE — 77063 BREAST TOMOSYNTHESIS BI: CPT

## 2020-11-05 ENCOUNTER — OFFICE VISIT (OUTPATIENT)
Dept: INTERNAL MEDICINE CLINIC | Age: 75
End: 2020-11-05
Payer: MEDICARE

## 2020-11-05 VITALS
RESPIRATION RATE: 12 BRPM | WEIGHT: 143.4 LBS | HEART RATE: 60 BPM | OXYGEN SATURATION: 98 % | TEMPERATURE: 98.2 F | DIASTOLIC BLOOD PRESSURE: 64 MMHG | HEIGHT: 62 IN | SYSTOLIC BLOOD PRESSURE: 125 MMHG | BODY MASS INDEX: 26.39 KG/M2

## 2020-11-05 DIAGNOSIS — M81.0 AGE-RELATED OSTEOPOROSIS WITHOUT CURRENT PATHOLOGICAL FRACTURE: ICD-10-CM

## 2020-11-05 DIAGNOSIS — D12.6 COLON ADENOMA: ICD-10-CM

## 2020-11-05 DIAGNOSIS — Z12.11 SCREEN FOR COLON CANCER: ICD-10-CM

## 2020-11-05 DIAGNOSIS — E53.8 B12 DEFICIENCY DUE TO DIET: ICD-10-CM

## 2020-11-05 DIAGNOSIS — Z13.31 SCREENING FOR DEPRESSION: ICD-10-CM

## 2020-11-05 DIAGNOSIS — E03.9 ACQUIRED HYPOTHYROIDISM: ICD-10-CM

## 2020-11-05 DIAGNOSIS — Z13.39 SCREENING FOR ALCOHOLISM: ICD-10-CM

## 2020-11-05 DIAGNOSIS — J45.20 MILD INTERMITTENT ASTHMA WITHOUT COMPLICATION: ICD-10-CM

## 2020-11-05 DIAGNOSIS — Z78.0 POSTMENOPAUSAL STATE: ICD-10-CM

## 2020-11-05 DIAGNOSIS — I10 ESSENTIAL HYPERTENSION: ICD-10-CM

## 2020-11-05 DIAGNOSIS — Z00.00 MEDICARE ANNUAL WELLNESS VISIT, SUBSEQUENT: Primary | ICD-10-CM

## 2020-11-05 PROCEDURE — G8536 NO DOC ELDER MAL SCRN: HCPCS | Performed by: INTERNAL MEDICINE

## 2020-11-05 PROCEDURE — G8419 CALC BMI OUT NRM PARAM NOF/U: HCPCS | Performed by: INTERNAL MEDICINE

## 2020-11-05 PROCEDURE — 1090F PRES/ABSN URINE INCON ASSESS: CPT | Performed by: INTERNAL MEDICINE

## 2020-11-05 PROCEDURE — G9711 PT HX TOT COL OR COLON CA: HCPCS | Performed by: INTERNAL MEDICINE

## 2020-11-05 PROCEDURE — G0463 HOSPITAL OUTPT CLINIC VISIT: HCPCS | Performed by: INTERNAL MEDICINE

## 2020-11-05 PROCEDURE — G8427 DOCREV CUR MEDS BY ELIG CLIN: HCPCS | Performed by: INTERNAL MEDICINE

## 2020-11-05 PROCEDURE — 1101F PT FALLS ASSESS-DOCD LE1/YR: CPT | Performed by: INTERNAL MEDICINE

## 2020-11-05 PROCEDURE — G0439 PPPS, SUBSEQ VISIT: HCPCS | Performed by: INTERNAL MEDICINE

## 2020-11-05 PROCEDURE — G9899 SCRN MAM PERF RSLTS DOC: HCPCS | Performed by: INTERNAL MEDICINE

## 2020-11-05 PROCEDURE — 99214 OFFICE O/P EST MOD 30 MIN: CPT | Performed by: INTERNAL MEDICINE

## 2020-11-05 PROCEDURE — G8510 SCR DEP NEG, NO PLAN REQD: HCPCS | Performed by: INTERNAL MEDICINE

## 2020-11-05 RX ORDER — FLUTICASONE PROPIONATE AND SALMETEROL 100; 50 UG/1; UG/1
1 POWDER RESPIRATORY (INHALATION) EVERY 12 HOURS
Qty: 1 EACH | Refills: 1 | Status: SHIPPED | OUTPATIENT
Start: 2020-11-05 | End: 2021-06-10

## 2020-11-05 NOTE — PROGRESS NOTES
Diagnoses and all orders for this visit:    1. Medicare annual wellness visit, subsequent  Patient's Medicare wellness visit was completed today. She is up-to-date  She is needs to follow-up with ophthalmology. Her flu shot was given September 2020  -     REFERRAL TO OPHTHALMOLOGY    2. Mild intermittent asthma without complication  Patient with intermittent spells of asthma  Discussed with her she is using albuterol 3 times per week or once at night she will initiate Wixela    -     fluticasone propion-salmeteroL (Wixela Inhub) 100-50 mcg/dose diskus inhaler; Take 1 Puff by inhalation every twelve (12) hours. Rinse mouth after use    3. Age-related osteoporosis without current pathological fracture  She is on Fosamax  Continue medication  Replete calcium and vitamin D  Discussed with patient that we will do a bone density in 2021 and offer holiday versus continuing on Fosamax for 2 more years  -     VITAMIN D, 25 HYDROXY; Future    4. Essential hypertension  Stable  Continue meds  -     METABOLIC PANEL, COMPREHENSIVE; Future  -     LIPID PANEL; Future    5. B12 deficiency due to diet  Patient does not eat very much red meat  We will check her for B12 deficiency. -     VITAMIN B12 & FOLATE; Future    6. Acquired hypothyroidism  If hypothryoid again then pt will do 125 mcg 3 times/week and 112 mcg 4 times per week and recheck in 3 months. Currently she is on 112 mcg daily  -     TSH 3RD GENERATION; Future  -     T4 (THYROXINE); Future    7. Colon adenoma  Patient has a history of a high-grade colon adenoma in 2003  She recalls having surveillance every 3 years then transition to every 5 years. Her last colonoscopy was in 2016 with Dr. Martin Chowdhury. She was told that she does not need any further colonoscopy  Given her history we will have her rechecked in 2021   -     CBC W/O DIFF; Future    8. Screening for alcoholism  Patient does not have alcohol issues    9.  Screening for depression  Patient does not have depression  -     DEPRESSION SCREEN ANNUAL    10. Screen for colon cancer  As noted above will send to Dr. Joie Zavala; Future    11. Postmenopausal state  She will have a bone density in 2021  -     DEXA BONE DENSITY STUDY AXIAL; Future       Allergies  She will try children's Allegra daily or twice a day to see if this helps her with congestion and dizziness  Continue Flonase    Chief Complaint   Patient presents with    Complete Physical     couple concers - under physical stress need to stop everything and sit down    Dizziness     only when lying down and turns head - had it back 7 or 8 years ago        High risk adenoma s/p colectomy. Pt reports in [place] she was getting closer surveillance due to the pathology of her colon results. She had a bout of Diverticulitis and was seen and treated by Dr. Benjy Mc. She was told she did not need any further colonoscopy. She is having difficulty retrieving the pathology from [place]. They are not in [facility]. 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 Benjy Mc did last colonscopy  Aunt with colon cancer  She had colon resection and was told precancerous very advanced adenoma. She was to have every 3 years then 5 years. She was seen by GI and was told she never needed a colonscopy again. Vertigo  Patient reports that she will occasionally have some vertigo type symptoms and ringing in her ears. Lies down and turns head dizziness but nothing spinning. When she lies down she has a mild vertigo. Sits up on side of bed dizzy  She is not sure if it could be allergies. Subjective:   Dena Butcher is a 70 y.o. female with hypertension. Hypertension ROS: taking medications as instructed, no medication side effects noted, no TIA's, no chest pain on exertion, no dyspnea on exertion, no swelling of ankles. New concerns: None.      Asthma  Current control: Good   Current level: mild intermittent  Current symptoms: none  Current controller: Advair but was $183  Last flare up: Greater than 1 year ago. Number of flareups in past year: None  Current symptom relief med: Ventolin      SUBJECTIVE: Lavon Quiroz is a 70 y.o. female here for follow up of hypothyroidism. Lab Results   Component Value Date/Time    TSH 4.960 (H) 2020 08:08 AM     Thyroid ROS: denies fatigue, weight changes, heat/cold intolerance, bowel/skin changes or CVS symptoms.        Past Medical History:   Diagnosis Date    Asthma     Cataract     Hypertension     Joint pain     feet and hips     Precancerous lesion     Colon, resection - high risk adenoma upper part of the descending colon    Thyroid disease     hypothyroid     Past Surgical History:   Procedure Laterality Date    COLONOSCOPY N/A 2016    COLONOSCOPY performed by Joseph Soto MD at [address] [facility] HX BREAST BIOPSY Bilateral     negative    HX CATARACT REMOVAL      HX COLECTOMY      partial, appr 6\" according to patient, heavily transformed adenoma    HX COLONOSCOPY      partial colon removed   colonscopy q 5 years     HX DILATION AND CURETTAGE      X2 after miscarriages    HX LAPAROSCOPIC SUPRACERVICAL HYSTERECTOMY  2018    with BSO/DAVINCI    HX SACROCOLPOPEXY  2018    HX TONSIL AND ADENOIDECTOMY      HX UROLOGICAL  2018    Urodynamics    HX UROLOGICAL  2018    TVT     Social History     Socioeconomic History    Marital status:      Spouse name: Not on file    Number of children: Not on file    Years of education: Not on file    Highest education level: Not on file   Tobacco Use    Smoking status: Former Smoker     Last attempt to quit: 1969     Years since quittin.7    Smokeless tobacco: Never Used    Tobacco comment: stopped    Substance and Sexual Activity    Alcohol use: Yes     Binge frequency: Monthly     Comment: very occasionally, social-none in past several weeks    Drug use: No    Sexual activity: Never     Partners: Male   Social History Narrative    Working part time for [employer]    manageble stress        Not     Children: 2 sons healthy    2 grandchildren healthy so far     Family History   Problem Relation Age of Onset    Cancer Mother         cervical cancer    Stroke Mother         hemorhagic    Diabetes Father     Hypertension Father     Liver Disease Sister         passed January 2015    No Known Problems Son     No Known Problems Son      Current Outpatient Medications   Medication Sig Dispense Refill    alendronate (FOSAMAX) 70 mg tablet TAKE 1 TABLET EVERY 7 DAYS 12 Tab 4    levothyroxine (SYNTHROID) 112 mcg tablet TAKE 1 TABLET DAILY BEFORE BREAKFAST FOR HYPOTHYROIDISM (LABS PLEASE AND APPOINTMENT) 30 Tab 12    vit C/E/Zn/coppr/lutein/zeaxan (PRESERVISION AREDS-2 PO) Take  by mouth.  losartan (COZAAR) 25 mg tablet TAKE 1 TABLET DAILY 90 Tab 4    albuterol (PROVENTIL HFA, VENTOLIN HFA, PROAIR HFA) 90 mcg/actuation inhaler Take 2 Puffs by inhalation every four (4) hours as needed for Wheezing or Shortness of Breath. 1 Inhaler 3    calcium carbonate (CALCIUM 300 PO) Take  by mouth.  FOLIC ACID/MULTIVIT-MIN/LUTEIN (CENTRUM SILVER PO) Take  by mouth as needed.  polyethylene glycol (MIRALAX) 17 gram/dose powder TK 17 GRAMS DISSOLVED IN WATER ONCE A DAY  6    aspirin delayed-release 81 mg tablet Take  by mouth daily.  calcium-cholecalciferol, d3, 600-125 mg-unit tab Take  by mouth.  Indications: NOT TAKING       No Known Allergies    Review of Systems - General ROS: negative for - chills or fever  Cardiovascular ROS: no chest pain or dyspnea on exertion  Respiratory ROS: no cough, shortness of breath, or wheezing    Visit Vitals  /64 (BP 1 Location: Left arm, BP Patient Position: Sitting)   Pulse 60   Temp 98.2 °F (36.8 °C) (Oral)   Resp 12   Ht 5' 2\" (1.575 m)   Wt 143 lb 6.4 oz (65 kg)   SpO2 98%   BMI 26.23 kg/m²           Constitutional: [x] Appears well-developed and well-nourished [x] No apparent distress      [] Abnormal -     Mental status: [x] Alert and awake  [x] Oriented to person/place/time [x] Able to follow commands    [] Abnormal -     Eyes:   EOM    [x]  Normal    [] Abnormal -   Sclera  [x]  Normal    [] Abnormal -          Discharge [x]  None visible   [] Abnormal -     HENT: [x] Normocephalic, atraumatic  [] Abnormal -   [x] Mouth/Throat: Mucous membranes are moist    External Ears [x] Normal  [] Abnormal -    Neck: [x] No visualized mass [] Abnormal -     Pulmonary/Chest: [x] Respiratory effort normal   [x] No visualized signs of difficulty breathing or respiratory distress        [] Abnormal -      Musculoskeletal:   [x] Normal gait with no signs of ataxia         [x] Normal range of motion of neck        [] Abnormal -     Neurological:        [x] No Facial Asymmetry (Cranial nerve 7 motor function) (limited exam due to video visit)          [x] No gaze palsy        [] Abnormal -          Skin:        [x] No significant exanthematous lesions or discoloration noted on facial skin         [] Abnormal -            Psychiatric:       [x] Normal Affect [] Abnormal -        [x] No Hallucinations                                               This is the Subsequent Medicare Annual Wellness Exam, performed 12 months or more after the Initial AWV or the last Subsequent AWV    I have reviewed the patient's medical history in detail and updated the computerized patient record.      History     Patient Active Problem List   Diagnosis Code    Advanced care planning/counseling discussion Z71.89    Cystocele, midline N81.11    Cystocele with rectocele N81.10, N81.6     Past Medical History:   Diagnosis Date    Asthma     Cataract     Hypertension     Joint pain     feet and hips     Precancerous lesion 2003    Colon, resection - high risk adenoma upper part of the descending colon    Thyroid disease     hypothyroid      Past Surgical History:   Procedure Laterality Date    COLONOSCOPY N/A 6/20/2016    COLONOSCOPY performed by Jada Guzman MD at [address] HX BREAST BIOPSY Bilateral     negative    HX CATARACT REMOVAL      HX COLECTOMY  2003    partial, appr 6\" according to patient, heavily transformed adenoma    HX COLONOSCOPY      partial colon removed   colonscopy q 5 years     HX DILATION AND CURETTAGE      X2 after miscarriages    HX LAPAROSCOPIC SUPRACERVICAL HYSTERECTOMY  03/01/2018    with BSO/DAVINCI    HX SACROCOLPOPEXY  03/01/2018    HX TONSIL AND ADENOIDECTOMY      HX UROLOGICAL  01/12/2018    Urodynamics    HX UROLOGICAL  03/01/2018    TVT     Current Outpatient Medications   Medication Sig Dispense Refill    alendronate (FOSAMAX) 70 mg tablet TAKE 1 TABLET EVERY 7 DAYS 12 Tab 4    levothyroxine (SYNTHROID) 112 mcg tablet TAKE 1 TABLET DAILY BEFORE BREAKFAST FOR HYPOTHYROIDISM (LABS PLEASE AND APPOINTMENT) 30 Tab 12    vit C/E/Zn/coppr/lutein/zeaxan (PRESERVISION AREDS-2 PO) Take  by mouth.  losartan (COZAAR) 25 mg tablet TAKE 1 TABLET DAILY 90 Tab 4    albuterol (PROVENTIL HFA, VENTOLIN HFA, PROAIR HFA) 90 mcg/actuation inhaler Take 2 Puffs by inhalation every four (4) hours as needed for Wheezing or Shortness of Breath. 1 Inhaler 3    calcium carbonate (CALCIUM 300 PO) Take  by mouth.  FOLIC ACID/MULTIVIT-MIN/LUTEIN (CENTRUM SILVER PO) Take  by mouth as needed.  polyethylene glycol (MIRALAX) 17 gram/dose powder TK 17 GRAMS DISSOLVED IN WATER ONCE A DAY  6    aspirin delayed-release 81 mg tablet Take  by mouth daily.  calcium-cholecalciferol, d3, 600-125 mg-unit tab Take  by mouth.  Indications: NOT TAKING       No Known Allergies    Family History   Problem Relation Age of Onset    Cancer Mother         cervical cancer    Stroke Mother         hemorhagic    Diabetes Father     Hypertension Father     Liver Disease Sister         passed 2015    No Known Problems Son     No Known Problems Son      Social History     Tobacco Use    Smoking status: Former Smoker     Last attempt to quit: 1969     Years since quittin.7    Smokeless tobacco: Never Used    Tobacco comment: stopped    Substance Use Topics    Alcohol use: Yes     Binge frequency: Monthly     Comment: very occasionally, social-none in past several weeks       Depression Risk Factor Screening:     3 most recent PHQ Screens 2020   Little interest or pleasure in doing things Not at all   Feeling down, depressed, irritable, or hopeless Not at all   Total Score PHQ 2 0       Alcohol Risk Screen   Do you average more than 1 drink per night or more than 7 drinks a week:  No    On any one occasion in the past three months have you have had more than 3 drinks containing alcohol:  No        Functional Ability and Level of Safety:   Hearing: Hearing is good. Activities of Daily Living: The home contains: no safety equipment. Patient does total self care     Ambulation: with no difficulty     Fall Risk:  Fall Risk Assessment, last 12 mths 2020   Able to walk? Yes   Fall in past 12 months?  No   Fall with injury? -   Number of falls in past 12 months -   Fall Risk Score -     Abuse Screen:  Patient is not abused       Cognitive Screening   Has your family/caregiver stated any concerns about your memory: no     Cognitive Screening: Normal - Verbal Fluency Test    Patient Care Team   Patient Care Team:  Claribel Baron MD as PCP - General (Internal Medicine)  Claribel Baron MD as PCP - REHABILITATION [facility] Empaneled Provider    Assessment/Plan   Education and counseling provided:  Are appropriate based on today's review and evaluation  End-of-Life planning (with patient's consent)  Pneumococcal Vaccine  Influenza Vaccine  Screening Mammography  Colorectal cancer screening tests  Bone mass measurement (DEXA)  Screening for glaucoma  Diabetes screening test

## 2020-11-05 NOTE — PATIENT INSTRUCTIONS
Medicare Wellness Visit, Female The best way to live healthy is to have a lifestyle where you eat a well-balanced diet, exercise regularly, limit alcohol use, and quit all forms of tobacco/nicotine, if applicable. Regular preventive services are another way to keep healthy. Preventive services (vaccines, screening tests, monitoring & exams) can help personalize your care plan, which helps you manage your own care. Screening tests can find health problems at the earliest stages, when they are easiest to treat. [facility] follows the current, evidence-based guidelines published by the [facility] ([facility]) when recommending preventive services for our patients. Because we follow these guidelines, sometimes recommendations change over time as research supports it. (For example, mammograms used to be recommended annually. Even though Medicare will still pay for an annual mammogram, the newer guidelines recommend a mammogram every two years for women of average risk). Of course, you and your doctor may decide to screen more often for some diseases, based on your risk and your co-morbidities (chronic disease you are already diagnosed with). Preventive services for you include: - Medicare offers their members a free annual wellness visit, which is time for you and your primary care provider to discuss and plan for your preventive service needs. Take advantage of this benefit every year! 
-All adults over the age of 72 should receive the recommended pneumonia vaccines. Current USPSTF guidelines recommend a series of two vaccines for the best pneumonia protection.  
-All adults should have a flu vaccine yearly and a tetanus vaccine every 10 years.  
-All adults age 48 and older should receive the shingles vaccines (series of two vaccines). -All adults age 38-68 who are overweight should have a diabetes screening test once every three years. -All adults born between [birth year] and 1963 should be screened once for Hepatitis C. 
-Other screening tests and preventive services for persons with diabetes include: an eye exam to screen for diabetic retinopathy, a kidney function test, a foot exam, and stricter control over your cholesterol.  
-Cardiovascular screening for adults with routine risk involves an electrocardiogram (ECG) at intervals determined by your doctor.  
-Colorectal cancer screenings should be done for adults age 54-65 with no increased risk factors for colorectal cancer. There are a number of acceptable methods of screening for this type of cancer. Each test has its own benefits and drawbacks. Discuss with your doctor what is most appropriate for you during your annual wellness visit. The different tests include: colonoscopy (considered the best screening method), a fecal occult blood test, a fecal DNA test, and sigmoidoscopy. 
 
-A bone mass density test is recommended when a woman turns 65 to screen for osteoporosis. This test is only recommended one time, as a screening. Some providers will use this same test as a disease monitoring tool if you already have osteoporosis. -Breast cancer screenings are recommended every other year for women of normal risk, age 54-69. 
-Cervical cancer screenings for women over age 72 are only recommended with certain risk factors. Here is a list of your current Health Maintenance items (your personalized list of preventive services) with a due date: 
Health Maintenance Due Topic Date Due  Glaucoma Screening   04/21/2019  Yearly Flu Vaccine (1) 09/01/2020 [facility] Annual Well Visit  11/01/2020

## 2020-11-06 LAB
25(OH)D3 SERPL-MCNC: 64.6 NG/ML (ref 30–100)
ALBUMIN SERPL-MCNC: 4.3 G/DL (ref 3.5–5)
ALBUMIN/GLOB SERPL: 1.7 {RATIO} (ref 1.1–2.2)
ALP SERPL-CCNC: 77 U/L (ref 45–117)
ALT SERPL-CCNC: 18 U/L (ref 12–78)
ANION GAP SERPL CALC-SCNC: 6 MMOL/L (ref 5–15)
AST SERPL-CCNC: 20 U/L (ref 15–37)
BILIRUB SERPL-MCNC: 0.7 MG/DL (ref 0.2–1)
BUN SERPL-MCNC: 11 MG/DL (ref 6–20)
BUN/CREAT SERPL: 20 (ref 12–20)
CALCIUM SERPL-MCNC: 9.3 MG/DL (ref 8.5–10.1)
CHLORIDE SERPL-SCNC: 105 MMOL/L (ref 97–108)
CHOLEST SERPL-MCNC: 177 MG/DL
CO2 SERPL-SCNC: 28 MMOL/L (ref 21–32)
CREAT SERPL-MCNC: 0.56 MG/DL (ref 0.55–1.02)
ERYTHROCYTE [DISTWIDTH] IN BLOOD BY AUTOMATED COUNT: 11.9 % (ref 11.5–14.5)
FOLATE SERPL-MCNC: 29.7 NG/ML (ref 5–21)
GLOBULIN SER CALC-MCNC: 2.5 G/DL (ref 2–4)
GLUCOSE SERPL-MCNC: 91 MG/DL (ref 65–100)
HCT VFR BLD AUTO: 40.1 % (ref 35–47)
HDLC SERPL-MCNC: 70 MG/DL
HDLC SERPL: 2.5 {RATIO} (ref 0–5)
HGB BLD-MCNC: 13.4 G/DL (ref 11.5–16)
LDLC SERPL CALC-MCNC: 96.6 MG/DL (ref 0–100)
LIPID PROFILE,FLP: NORMAL
MCH RBC QN AUTO: 32.7 PG (ref 26–34)
MCHC RBC AUTO-ENTMCNC: 33.4 G/DL (ref 30–36.5)
MCV RBC AUTO: 97.8 FL (ref 80–99)
NRBC # BLD: 0 K/UL (ref 0–0.01)
NRBC BLD-RTO: 0 PER 100 WBC
PLATELET # BLD AUTO: 245 K/UL (ref 150–400)
PMV BLD AUTO: 11.1 FL (ref 8.9–12.9)
POTASSIUM SERPL-SCNC: 4.2 MMOL/L (ref 3.5–5.1)
PROT SERPL-MCNC: 6.8 G/DL (ref 6.4–8.2)
RBC # BLD AUTO: 4.1 M/UL (ref 3.8–5.2)
SODIUM SERPL-SCNC: 139 MMOL/L (ref 136–145)
T4 SERPL-MCNC: 12.4 UG/DL (ref 4.8–13.9)
TRIGL SERPL-MCNC: 52 MG/DL (ref ?–150)
TSH SERPL DL<=0.05 MIU/L-ACNC: 2.54 UIU/ML (ref 0.36–3.74)
VIT B12 SERPL-MCNC: 676 PG/ML (ref 193–986)
VLDLC SERPL CALC-MCNC: 10.4 MG/DL
WBC # BLD AUTO: 4.2 K/UL (ref 3.6–11)

## 2020-12-03 DIAGNOSIS — J45.20 RAD (REACTIVE AIRWAY DISEASE), MILD INTERMITTENT, UNCOMPLICATED: ICD-10-CM

## 2020-12-03 RX ORDER — ALBUTEROL SULFATE 90 UG/1
AEROSOL, METERED RESPIRATORY (INHALATION)
Qty: 18 G | Refills: 10 | Status: SHIPPED | OUTPATIENT
Start: 2020-12-03 | End: 2021-11-09 | Stop reason: SDUPTHER

## 2020-12-08 ENCOUNTER — TELEPHONE (OUTPATIENT)
Dept: INTERNAL MEDICINE CLINIC | Age: 75
End: 2020-12-08

## 2020-12-08 RX ORDER — ALBUTEROL SULFATE 90 UG/1
1 AEROSOL, METERED RESPIRATORY (INHALATION)
Qty: 3 INHALER | Refills: 2 | Status: SHIPPED | OUTPATIENT
Start: 2020-12-08 | End: 2021-06-10

## 2021-02-01 DIAGNOSIS — E07.9 THYROID DISORDER: ICD-10-CM

## 2021-02-01 RX ORDER — LEVOTHYROXINE SODIUM 112 UG/1
TABLET ORAL
Qty: 90 TAB | Refills: 3 | Status: SHIPPED | OUTPATIENT
Start: 2021-02-01 | End: 2022-01-12

## 2021-03-18 DIAGNOSIS — M81.0 AGE-RELATED OSTEOPOROSIS WITHOUT CURRENT PATHOLOGICAL FRACTURE: ICD-10-CM

## 2021-03-18 RX ORDER — ALENDRONATE SODIUM 70 MG/1
TABLET ORAL
Qty: 12 TAB | Refills: 3 | Status: SHIPPED | OUTPATIENT
Start: 2021-03-18 | End: 2022-05-31

## 2021-03-30 DIAGNOSIS — I10 ESSENTIAL HYPERTENSION: ICD-10-CM

## 2021-03-30 RX ORDER — LOSARTAN POTASSIUM 25 MG/1
TABLET ORAL
Qty: 90 TAB | Refills: 3 | Status: SHIPPED | OUTPATIENT
Start: 2021-03-30 | End: 2022-09-17

## 2021-06-01 ENCOUNTER — HOSPITAL ENCOUNTER (OUTPATIENT)
Dept: MAMMOGRAPHY | Age: 76
Discharge: HOME | End: 2021-06-01
Attending: INTERNAL MEDICINE
Payer: MEDICARE

## 2021-06-01 DIAGNOSIS — Z78.0 POSTMENOPAUSAL STATE: ICD-10-CM

## 2021-06-01 PROCEDURE — 77080 DXA BONE DENSITY AXIAL: CPT

## 2021-06-10 RX ORDER — POLYETHYLENE GLYCOL 3350 17 G/17G
17 POWDER, FOR SOLUTION ORAL AS NEEDED
COMMUNITY

## 2021-06-10 RX ORDER — CHOLECALCIFEROL (VITAMIN D3) 125 MCG
5000 CAPSULE ORAL
COMMUNITY

## 2021-06-21 ENCOUNTER — ANESTHESIA (OUTPATIENT)
Dept: ENDOSCOPY | Age: 76
End: 2021-06-21
Payer: MEDICARE

## 2021-06-21 ENCOUNTER — HOSPITAL ENCOUNTER (OUTPATIENT)
Age: 76
Setting detail: OUTPATIENT SURGERY
Discharge: HOME | End: 2021-06-21
Attending: INTERNAL MEDICINE | Admitting: INTERNAL MEDICINE
Payer: MEDICARE

## 2021-06-21 ENCOUNTER — ANESTHESIA EVENT (OUTPATIENT)
Dept: ENDOSCOPY | Age: 76
End: 2021-06-21
Payer: MEDICARE

## 2021-06-21 VITALS
SYSTOLIC BLOOD PRESSURE: 119 MMHG | OXYGEN SATURATION: 97 % | TEMPERATURE: 98.3 F | RESPIRATION RATE: 19 BRPM | DIASTOLIC BLOOD PRESSURE: 70 MMHG | WEIGHT: 143 LBS | HEIGHT: 62 IN | BODY MASS INDEX: 26.31 KG/M2 | HEART RATE: 57 BPM

## 2021-06-21 PROCEDURE — 74011250637 HC RX REV CODE- 250/637: Performed by: INTERNAL MEDICINE

## 2021-06-21 PROCEDURE — 74011250636 HC RX REV CODE- 250/636: Performed by: NURSE ANESTHETIST, CERTIFIED REGISTERED

## 2021-06-21 PROCEDURE — 76040000007: Performed by: INTERNAL MEDICINE

## 2021-06-21 PROCEDURE — 76060000032 HC ANESTHESIA 0.5 TO 1 HR: Performed by: INTERNAL MEDICINE

## 2021-06-21 PROCEDURE — 2709999900 HC NON-CHARGEABLE SUPPLY: Performed by: INTERNAL MEDICINE

## 2021-06-21 PROCEDURE — 88305 TISSUE EXAM BY PATHOLOGIST: CPT

## 2021-06-21 PROCEDURE — 77030029384 HC SNR POLYP CAPTVR II BSC -B: Performed by: INTERNAL MEDICINE

## 2021-06-21 RX ORDER — EPINEPHRINE 0.1 MG/ML
1 INJECTION INTRACARDIAC; INTRAVENOUS
Status: DISCONTINUED | OUTPATIENT
Start: 2021-06-21 | End: 2021-06-22 | Stop reason: HOSPADM

## 2021-06-21 RX ORDER — ASPIRIN 81 MG/1
TABLET ORAL DAILY
COMMUNITY

## 2021-06-21 RX ORDER — PROPOFOL 10 MG/ML
INJECTION, EMULSION INTRAVENOUS AS NEEDED
Status: DISCONTINUED | OUTPATIENT
Start: 2021-06-21 | End: 2021-06-21 | Stop reason: HOSPADM

## 2021-06-21 RX ORDER — ATROPINE SULFATE 0.1 MG/ML
0.5 INJECTION INTRAVENOUS
Status: DISCONTINUED | OUTPATIENT
Start: 2021-06-21 | End: 2021-06-22 | Stop reason: HOSPADM

## 2021-06-21 RX ORDER — DEXTROMETHORPHAN/PSEUDOEPHED 2.5-7.5/.8
1.2 DROPS ORAL
Status: DISCONTINUED | OUTPATIENT
Start: 2021-06-21 | End: 2021-06-22 | Stop reason: HOSPADM

## 2021-06-21 RX ORDER — SODIUM CHLORIDE 0.9 % (FLUSH) 0.9 %
5-40 SYRINGE (ML) INJECTION EVERY 8 HOURS
Status: DISCONTINUED | OUTPATIENT
Start: 2021-06-21 | End: 2021-06-22 | Stop reason: HOSPADM

## 2021-06-21 RX ORDER — PHENYLEPHRINE HCL IN 0.9% NACL 0.4MG/10ML
SYRINGE (ML) INTRAVENOUS AS NEEDED
Status: DISCONTINUED | OUTPATIENT
Start: 2021-06-21 | End: 2021-06-21 | Stop reason: HOSPADM

## 2021-06-21 RX ORDER — MIDAZOLAM HYDROCHLORIDE 1 MG/ML
.25-1 INJECTION, SOLUTION INTRAMUSCULAR; INTRAVENOUS
Status: DISCONTINUED | OUTPATIENT
Start: 2021-06-21 | End: 2021-06-21 | Stop reason: HOSPADM

## 2021-06-21 RX ORDER — NALOXONE HYDROCHLORIDE 0.4 MG/ML
0.4 INJECTION, SOLUTION INTRAMUSCULAR; INTRAVENOUS; SUBCUTANEOUS
Status: DISCONTINUED | OUTPATIENT
Start: 2021-06-21 | End: 2021-06-21 | Stop reason: HOSPADM

## 2021-06-21 RX ORDER — FLUMAZENIL 0.1 MG/ML
0.2 INJECTION INTRAVENOUS
Status: DISCONTINUED | OUTPATIENT
Start: 2021-06-21 | End: 2021-06-21 | Stop reason: HOSPADM

## 2021-06-21 RX ORDER — FENTANYL CITRATE 50 UG/ML
100 INJECTION, SOLUTION INTRAMUSCULAR; INTRAVENOUS
Status: DISCONTINUED | OUTPATIENT
Start: 2021-06-21 | End: 2021-06-22 | Stop reason: HOSPADM

## 2021-06-21 RX ORDER — SODIUM CHLORIDE 9 MG/ML
50 INJECTION, SOLUTION INTRAVENOUS CONTINUOUS
Status: DISCONTINUED | OUTPATIENT
Start: 2021-06-21 | End: 2021-06-21 | Stop reason: HOSPADM

## 2021-06-21 RX ORDER — SODIUM CHLORIDE 0.9 % (FLUSH) 0.9 %
5-40 SYRINGE (ML) INJECTION AS NEEDED
Status: DISCONTINUED | OUTPATIENT
Start: 2021-06-21 | End: 2021-06-22 | Stop reason: HOSPADM

## 2021-06-21 RX ORDER — SODIUM CHLORIDE 9 MG/ML
INJECTION, SOLUTION INTRAVENOUS
Status: DISCONTINUED | OUTPATIENT
Start: 2021-06-21 | End: 2021-06-21 | Stop reason: HOSPADM

## 2021-06-21 RX ADMIN — SODIUM CHLORIDE: 900 INJECTION, SOLUTION INTRAVENOUS at 10:04

## 2021-06-21 RX ADMIN — PROPOFOL 50 MG: 10 INJECTION, EMULSION INTRAVENOUS at 10:14

## 2021-06-21 RX ADMIN — PROPOFOL 50 MG: 10 INJECTION, EMULSION INTRAVENOUS at 10:06

## 2021-06-21 RX ADMIN — PROPOFOL 50 MG: 10 INJECTION, EMULSION INTRAVENOUS at 09:54

## 2021-06-21 RX ADMIN — Medication 80 MCG: at 09:57

## 2021-06-21 RX ADMIN — Medication 80 MCG: at 10:19

## 2021-06-21 RX ADMIN — PROPOFOL 50 MG: 10 INJECTION, EMULSION INTRAVENOUS at 09:48

## 2021-06-21 RX ADMIN — Medication 120 MCG: at 10:00

## 2021-06-21 RX ADMIN — PROPOFOL 50 MG: 10 INJECTION, EMULSION INTRAVENOUS at 10:01

## 2021-06-21 RX ADMIN — PROPOFOL 100 MG: 10 INJECTION, EMULSION INTRAVENOUS at 09:45

## 2021-06-21 RX ADMIN — SODIUM CHLORIDE: 900 INJECTION, SOLUTION INTRAVENOUS at 09:05

## 2021-06-21 RX ADMIN — Medication 80 MCG: at 10:10

## 2021-06-21 RX ADMIN — PROPOFOL 50 MG: 10 INJECTION, EMULSION INTRAVENOUS at 10:11

## 2021-06-21 RX ADMIN — PROPOFOL 50 MG: 10 INJECTION, EMULSION INTRAVENOUS at 10:19

## 2021-06-21 NOTE — ANESTHESIA PREPROCEDURE EVALUATION
Anesthetic History   No history of anesthetic complications            Review of Systems / Medical History  Patient summary reviewed and pertinent labs reviewed    Pulmonary            Asthma : well controlled       Neuro/Psych   Within defined limits           Cardiovascular    Hypertension              Exercise tolerance: >4 METS     GI/Hepatic/Renal  Within defined limits              Endo/Other      Hypothyroidism: well controlled       Other Findings              Physical Exam    Airway  Mallampati: II  TM Distance: 4 - 6 cm  Neck ROM: normal range of motion   Mouth opening: Normal     Cardiovascular  Regular rate and rhythm,  S1 and S2 normal,  no murmur, click, rub, or gallop  Rhythm: regular  Rate: normal         Dental  No notable dental hx       Pulmonary  Breath sounds clear to auscultation               Abdominal  GI exam deferred       Other Findings            Anesthetic Plan    ASA: 2  Anesthesia type: MAC          Induction: Intravenous  Anesthetic plan and risks discussed with: Patient

## 2021-06-21 NOTE — ANESTHESIA POSTPROCEDURE EVALUATION
Procedure(s):  COLONOSCOPY  ENDOSCOPIC POLYPECTOMY. MAC    Anesthesia Post Evaluation        Patient participation: complete - patient participated  Level of consciousness: awake  Pain management: adequate  Airway patency: patent  Anesthetic complications: no  Cardiovascular status: hemodynamically stable  Respiratory status: acceptable  Hydration status: acceptable  Comments: The patient is ready for PACU discharge. Marisol Otero DO                   Post anesthesia nausea and vomiting:  controlled      INITIAL Post-op Vital signs:   Vitals Value Taken Time   /70 06/21/21 1050   Temp     Pulse 58 06/21/21 1051   Resp 19 06/21/21 1051   SpO2 97 % 06/21/21 1051   Vitals shown include unvalidated device data.

## 2021-06-21 NOTE — DISCHARGE INSTRUCTIONS
118 [facility].  217 [facility] [address], [address]  [address] [facility] [place]  [id number]  1945    It was my pleasure seeing you for your procedure. You will also receive a summary report with the findings from this procedure and any further recommendations. If you had polyps removed or biopsies taken during your procedure, you will receive a separate letter from me within the next 2 weeks. If you don't receive this letter or if you have any questions, please call my office [phone]. Please take note of the post procedure instructions listed below. Irwin Vazquez,    Dr. Jorge Matt    These instructions give you information on caring for yourself after your procedure. Call your doctor if you have any problems or questions after your procedure. HOME CARE  · Walk if you have belly cramping or gas. Walking will help get rid of the air and reduce the bloated feeling in your belly (abdomen). · Your IV site (where you received drugs) may be tender to touch. Place warm towels on the site; keep your arm up on two pillows if you have any swelling or soreness in the area. · You may shower. ACTIVITY:  · Take frequent rest periods and move at a slower pace for the next 24 hours. .  · You may resume your regular activity tomorrow if you are feeling back to normal.  · Do not drive or ride a bicycle for at least 24 hours (because of the medicine (anesthesia) used during the test). · Do not sign any important legal documents or use or operate any machinery for 24 hours  · Do not take sleeping medicines/nerve drugs for 24 hours unless the doctor tells you. · You can return to work/school tomorrow unless otherwise instructed. NUTRITION:  · Drink plenty of fluids to keep your pee (urine) clear or pale yellow  · Begin with a light meal and progress to your normal diet.  Heavy or fried foods are harder to digest and may make you feel sick to your stomach (nauseated). · Once you are feeling back to normal, you may resume your normal diet as instructed by your doctor. · Avoid alcoholic beverages for 24 hours or as instructed. IF YOU HAD BIOPSIES TAKEN OR POLYPS REMOVED DURING THE PROCEDURE:  · For the next 7 days, avoid all non-steroidal antiinflammatory medications such as Ibuprofen, Motrin, Advil, Alleve, Jessica-seltzer, Goody's powder, BC powder. · If you do not have an heart condition that requires you to take a daily aspirin, you should avoid taking aspirin for 7 days. · Eat a soft diet for 24 hours. · Monitor your stools for any blood or dark black, tar-like, stools as this may be a sign of bleeding and if you see any blood, notify your doctor immediately. GET HELP RIGHT AWAY AND SEEK IMMEDIATE MEDICAL CARE IF:  · You have more than a spotting of blood in your stool. · You pass clumps of tissue (blood clots) or fill the toilet with blood. · Your belly is painfully swollen or puffy (abdominal distention). · You throw up (vomit). · You have a fever. · You have redness, pain or swelling at the IV site that last greater than two days. · You have abdominal pain or discomfort that is severe or gets worse throughout the day. Post-procedure diagnosis:  Diverticulosis, colon polyp    Post-procedure recommendations:    Findings:   Rectum: normal  Sigmoid: moderate diverticulosis, transitioned to pediatric colonoscope to transverse  Descending Colon: mild diverticulosis  Transverse Colon: mild diverticulosis  Ascending Colon: mild diverticulosis, one benign appearing flat 4 mm sessile polyp removed with cold snare  Cecum: normal    Recommendations:   - Await pathology. You should receive a letter within 2 weeks. - Resume normal medications.  - Noting age, no further colonoscopies recommended solely for screening purposes.      Learning About Coronavirus [phone])  Coronavirus [phone]): Overview  What is coronavirus ([id number])? The coronavirus disease (COVID-19) is caused by a virus. It is an illness that was first found in [place], [place], in December 2019. It has since spread worldwide. The virus can cause fever, cough, and trouble breathing. In severe cases, it can cause pneumonia and make it hard to breathe without help. It can cause death. Coronaviruses are a large group of viruses. They cause the common cold. They also cause more serious illnesses like Middle East respiratory syndrome (MERS) and severe acute respiratory syndrome (SARS). COVID-19 is caused by a novel coronavirus. That means it's a new type that has not been seen in people before. This virus spreads person-to-person through droplets from coughing and sneezing. It can also spread when you are close to someone who is infected. And it can spread when you touch something that has the virus on it, such as a doorknob or a tabletop. What can you do to protect yourself from coronavirus (COVID-19)? The best way to protect yourself from getting sick is to:  · Avoid areas where there is an outbreak. · Avoid contact with people who may be infected. · Wash your hands often with soap or alcohol-based hand sanitizers. · Avoid crowds and try to stay at least 6 feet away from other people. · Wash your hands often, especially after you cough or sneeze. Use soap and water, and scrub for at least 20 seconds. If soap and water aren't available, use an alcohol-based hand . · Avoid touching your mouth, nose, and eyes. What can you do to avoid spreading the virus to others? To help avoid spreading the virus to others:  · Cover your mouth with a tissue when you cough or sneeze. Then throw the tissue in the trash. · Use a disinfectant to clean things that you touch often. · Stay home if you are sick or have been exposed to the virus. Don't go to school, work, or public areas. And don't use public transportation.   · If you are sick:  ? Leave your home only if you need to get medical care. But call the doctor's office first so they know you're coming. And wear a face mask, if you have one.  ? If you have a face mask, wear it whenever you're around other people. It can help stop the spread of the virus when you cough or sneeze. ? Clean and disinfect your home every day. Use household  and disinfectant wipes or sprays. Take special care to clean things that you grab with your hands. These include doorknobs, remote controls, phones, and handles on your refrigerator and microwave. And don't forget countertops, tabletops, bathrooms, and computer keyboards. When to call for help  Call 911 anytime you think you may need emergency care. For example, call if:  · You have severe trouble breathing. (You can't talk at all.)  · You have constant chest pain or pressure. · You are severely dizzy or lightheaded. · You are confused or can't think clearly. · Your face and lips have a blue color. · You pass out (lose consciousness) or are very hard to wake up. Call your doctor now if you develop symptoms such as:  · Shortness of breath. · Fever. · Cough. If you need to get care, call ahead to the doctor's office for instructions before you go. Make sure you wear a face mask, if you have one, to prevent exposing other people to the virus. Where can you get the latest information? The following health organizations are tracking and studying this virus. Their websites contain the most up-to-date information. Trever Pace also learn what to do if you think you may have been exposed to the virus. · U.S. Centers for Disease Control and Prevention (CDC): The CDC provides updated news about the disease and travel advice. The website also tells you how to prevent the spread of infection. www.cdc.gov  · World Health Organization [facility]): WHO offers information about the virus outbreaks.  WHO also has travel advice. www.who.int  Current as of: April 1, 2020               Content Version: 12.4  © 5233-0289 [employer]. Care instructions adapted under license by your healthcare professional. If you have questions about a medical condition or this instruction, always ask your healthcare professional. [address] any warranty or liability for your use of this information. Patient Education   Patient Education        Colon Polyps: Care Instructions  Your Care Instructions     Colon polyps are growths in the colon or the rectum. The cause of most colon polyps is not known, and most people who get them do not have any problems. But a certain kind can turn into cancer. For this reason, regular testing for colon polyps is important for people as they get older. It is also important for anyone who has an increased risk for colon cancer. Polyps are usually found through routine colon cancer screening tests. Although most colon polyps are not cancerous, they are usually removed and then tested for cancer. Screening for colon cancer saves lives because the cancer can usually be cured if it is caught early. If you have a polyp that is the type that can turn into cancer, you may need more tests to examine your entire colon. The doctor will remove any other polyps that he or she finds, and you will be tested more often. Follow-up care is a key part of your treatment and safety. Be sure to make and go to all appointments, and call your doctor if you are having problems. It's also a good idea to know your test results and keep a list of the medicines you take. How can you care for yourself at home? Regular exams to look for colon polyps are the best way to prevent polyps from turning into colon cancer. These can include stool tests, sigmoidoscopy, colonoscopy, and CT colonography. Talk with your doctor about a testing schedule that is right for you. To prevent polyps  There is no home treatment that can prevent colon polyps.  But these steps may help lower your risk for cancer. · Stay active. Being active can help you get to and stay at a healthy weight. Try to exercise on most days of the week. Walking is a good choice. · Eat well. Choose a variety of vegetables, fruits, legumes (such as peas and beans), fish, poultry, and whole grains. · Do not smoke. If you need help quitting, talk to your doctor about stop-smoking programs and medicines. These can increase your chances of quitting for good. · If you drink alcohol, limit how much you drink. Limit alcohol to 2 drinks a day for men and 1 drink a day for women. When should you call for help? Call your doctor now or seek immediate medical care if:    · You have severe belly pain.     · Your stools are maroon or very bloody. Watch closely for changes in your health, and be sure to contact your doctor if:    · You have a fever.     · You have nausea or vomiting.     · You have a change in bowel habits (new constipation or diarrhea).     · Your symptoms get worse or are not improving as expected. Where can you learn more? Go to http://www.man.com/  Enter C571 in the search box to learn more about \"Colon Polyps: Care Instructions. \"  Current as of: December 17, 2020               Content Version: 12.8  © 9524-5616 [employer]. Care instructions adapted under license by [employer] (which disclaims liability or warranty for this information). If you have questions about a medical condition or this instruction, always ask your healthcare professional. [place] [address] any warranty or liability for your use of this information. Diverticulosis: Care Instructions  Your Care Instructions  In diverticulosis, pouches called diverticula form in the wall of the large intestine (colon). The pouches do not cause any pain or other symptoms. Most people who have diverticulosis do not know they have it.  But the pouches sometimes bleed, and if they become infected, they can cause pain and other symptoms. When this happens, it is called diverticulitis. Diverticula form when pressure pushes the wall of the colon outward at certain weak points. A diet that is too low in fiber can cause diverticula. Follow-up care is a key part of your treatment and safety. Be sure to make and go to all appointments, and call your doctor if you are having problems. It's also a good idea to know your test results and keep a list of the medicines you take. How can you care for yourself at home? · Include fruits, leafy green vegetables, beans, and whole grains in your diet each day. These foods are high in fiber. · Take a fiber supplement, such as Citrucel or Metamucil, every day if needed. Read and follow all instructions on the label. · Drink plenty of fluids. If you have kidney, heart, or liver disease and have to limit fluids, talk with your doctor before you increase the amount of fluids you drink. · Get at least 30 minutes of exercise on most days of the week. Walking is a good choice. You also may want to do other activities, such as running, swimming, cycling, or playing tennis or team sports. · Cut out foods that cause gas, pain, or other symptoms. When should you call for help? Call your doctor now or seek immediate medical care if:    · You have belly pain.     · You pass maroon or very bloody stools.     · You have a fever.     · You have nausea and vomiting.     · You have unusual changes in your bowel movements or abdominal swelling.     · You have burning pain when you urinate.     · You have abnormal vaginal discharge.     · You have shoulder pain.     · You have cramping pain that does not get better when you have a bowel movement or pass gas.     · You pass gas or stool from your urethra while urinating. Watch closely for changes in your health, and be sure to contact your doctor if you have any problems. Where can you learn more?   Go to http://www.gray.com/  Enter [id number] in the search box to learn more about \"Diverticulosis: Care Instructions. \"  Current as of: April 15, 2020               Content Version: 12.8  © 2006-2021 Healthwise, [employer]. Care instructions adapted under license by [email or web address] (which disclaims liability or warranty for this information). If you have questions about a medical condition or this instruction, always ask your healthcare professional. [address] any warranty or liability for your use of this information.

## 2021-06-21 NOTE — PROCEDURES
118 [facility]  [address] [place], [place] [address], [address]  [phone]                              Colonoscopy Procedure Note      Indications:  Personal history colon polyps, last colonoscopy 2016     :  Kishan Solano MD    Staff: Endoscopy RN-1: Alvino Amaya RN  Endoscopy RN-2: Mattie Huff RN    Referring Provider: Micheal Syed MD    Sedation:  MAC anesthesia    Procedure Details:  After informed consent was obtained with all risks and benefits of procedure explained and preoperative exam completed, the patient was taken to the endoscopy suite and placed in the left lateral decubitus position. Upon sequential sedation as per above, a digital rectal exam was performed per below. The Olympus videocolonoscope was inserted in the rectum and carefully advanced to the cecum, which was identified by the ileocecal valve and appendiceal orifice. The quality of preparation was good. [place] Bowel Prep Score : 3/3/3. The colonoscope was slowly withdrawn with careful evaluation between folds. Retroflexion in the rectum was performed. Findings:   Rectum: normal  Sigmoid: moderate diverticulosis, transitioned to pediatric colonoscope to transverse  Descending Colon: mild diverticulosis  Transverse Colon: mild diverticulosis  Ascending Colon: mild diverticulosis, one benign appearing flat 4 mm sessile polyp removed with cold snare  Cecum: normal    Interventions:  polypectomy     Specimen Removed:    ID Type Source Tests Collected by Time Destination   1 : ascending colon polyp Preservative Colon, Ascending  Connor Rubinstein, MD 6/21/2021 1007 Pathology       Complications: None. EBL:  Minimal     Impression:    See Postoperative diagnosis above    Recommendations:   - Await pathology. You should receive a letter within 2 weeks. - Resume normal medications.  - Noting age, no further colonoscopies recommended solely for screening purposes.      Discharge Disposition:  Home in the company of a  when able to ambulate.     Bessie Lancaster MD  6/21/2021  10:25 AM

## 2021-06-21 NOTE — H&P
[address] [facility] [address]  217 [facility] 140 [facility], [address]  [phone]                                History and Physical     NAME: Dominique Sparks   :  1945   MRN:  [id number]     HPI:  The patient was seen and examined.     Past Surgical History:   Procedure Laterality Date    COLONOSCOPY N/A 2016    COLONOSCOPY performed by Vince Mejia MD at [address] [facility] [address] HX BREAST BIOPSY Bilateral     negative    HX CATARACT REMOVAL Bilateral     HX COLONOSCOPY      partial colon removed   colonscopy q 5 years     HX DILATION AND CURETTAGE      X2 after miscarriages    HX LAPAROSCOPIC SUPRACERVICAL HYSTERECTOMY  2018    with BSO/DAVINCI    HX OTHER SURGICAL      mole removed from shoulder - benign     HX SACROCOLPOPEXY  2018    HX TONSIL AND ADENOIDECTOMY      HX TONSILLECTOMY      HX TOTAL COLECTOMY  2003    partial, appr 6\" according to patient, heavily transformed adenoma    HX UROLOGICAL  2018    Urodynamics    HX UROLOGICAL  2018    TVT     Past Medical History:   Diagnosis Date    Asthma     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    Cataract     Hypertension     Ill-defined condition     slow heart rate - asymptomatic    Joint pain     feet and hips     Precancerous lesion     Colon, resection - high risk adenoma upper part of the descending colon    Thyroid disease     hypothyroid     Social History     Tobacco Use    Smoking status: Former Smoker     Quit date: 1969     Years since quittin.3    Smokeless tobacco: Never Used    Tobacco comment: stopped    Vaping Use    Vaping Use: Never used   Substance Use Topics    Alcohol use: Yes     Comment: very occasionally, social-none in past several weeks    Drug use: No     No Known Allergies  Family History   Problem Relation Age of Onset    Cancer Mother         cervical cancer    Stroke Mother         hemorhagic    Diabetes Father     Hypertension Father     Liver Disease Sister         passed January 2015    Post-op Nausea/Vomiting Son      Current Facility-Administered Medications   Medication Dose Route Frequency    0.9% sodium chloride infusion  50 mL/hr IntraVENous CONTINUOUS    sodium chloride (NS) flush 5-40 mL  5-40 mL IntraVENous Q8H    sodium chloride (NS) flush 5-40 mL  5-40 mL IntraVENous PRN    midazolam (VERSED) injection 0.25-10 mg  0.25-10 mg IntraVENous Multiple    fentaNYL citrate (PF) injection 100 mcg  100 mcg IntraVENous MULTIPLE DOSE GIVEN    naloxone (NARCAN) injection 0.4 mg  0.4 mg IntraVENous Multiple    flumazeniL (ROMAZICON) 0.1 mg/mL injection 0.2 mg  0.2 mg IntraVENous Multiple    simethicone (MYLICON) 63LV/4.2GT oral drops 80 mg  1.2 mL Oral Multiple    atropine injection 0.5 mg  0.5 mg IntraVENous ONCE PRN    EPINEPHrine (ADRENALIN) 0.1 mg/mL syringe 1 mg  1 mg Endoscopically ONCE PRN         PHYSICAL EXAM:  General: WD, WN. Alert, cooperative, no acute distress    HEENT: NC, Atraumatic. PERRLA, EOMI. Anicteric sclerae. Lungs:  CTA Bilaterally. No Wheezing/Rhonchi/Rales. Heart:  Regular  rhythm,  No murmur, No Rubs, No Gallops  Abdomen: Soft, Non distended, Non tender. +Bowel sounds, no HSM  Extremities: No c/c/e  Neurologic:  CN 2-12 gi, Alert and oriented X 3. No acute neurological distress   Psych:   Good insight. Not anxious nor agitated. The heart, lungs and mental status were satisfactory for the administration of MAC sedation and for the procedure. Mallampati score: 2     The patient was counseled at length about the risks of cruz Covid-19 in the danyel-operative and post-operative states including the recovery window of their procedure. The patient was made aware that cruz Covid-19 after a surgical procedure may worsen their prognosis for recovering from the virus and lend to a higher morbidity and or mortality risk. The patient was given the options of postponing their procedure. All of the risks, benefits, and alternatives were discussed. The patient does wish to proceed with the procedure.       Assessment:   · Personal history colon polyps    Plan:   · Endoscopic procedure :colonoscopy  · MAC sedation

## 2021-06-21 NOTE — PERIOP NOTES
.Patient has been evaluated by anesthesia pre-procedure. Patient alert and oriented. Vital signs will not be charted by the Endoscopy nurse. All vitals, airway, and loc are monitored by anesthesia staff throughout procedure. Report rec'd from ABW. .Endoscope will be pre-cleaned at bedside immediately following procedure by WENDY.

## 2021-06-24 ENCOUNTER — TRANSCRIBE ORDER (OUTPATIENT)
Dept: SCHEDULING | Age: 76
End: 2021-06-24

## 2021-06-24 DIAGNOSIS — Z12.31 SCREENING MAMMOGRAM FOR HIGH-RISK PATIENT: Primary | ICD-10-CM

## 2021-07-20 ENCOUNTER — HOSPITAL ENCOUNTER (OUTPATIENT)
Dept: MAMMOGRAPHY | Age: 76
Discharge: HOME | End: 2021-07-20
Attending: INTERNAL MEDICINE
Payer: MEDICARE

## 2021-07-20 DIAGNOSIS — Z12.31 SCREENING MAMMOGRAM FOR HIGH-RISK PATIENT: ICD-10-CM

## 2021-07-20 PROCEDURE — 77063 BREAST TOMOSYNTHESIS BI: CPT

## 2021-07-21 ENCOUNTER — PATIENT MESSAGE (OUTPATIENT)
Dept: INTERNAL MEDICINE CLINIC | Age: 76
End: 2021-07-21

## 2021-07-21 DIAGNOSIS — N63.31 MASS OF AXILLARY TAIL OF RIGHT BREAST: Primary | ICD-10-CM

## 2021-07-22 NOTE — TELEPHONE ENCOUNTER
From: Simone Guajardo  To: Angi Benavidez MD  Sent: 7/21/2021 8:32 AM EDT  Subject: Test Results Question    I had a mammogram yesterday and received a letter through my chart that I need follow up diagnostic imaging. I apparently need an order from Dr. Charles Danielson to set up an appointment.  How do I get an order for the additional test    Thank you  Jarrell Limon

## 2021-07-27 ENCOUNTER — TRANSCRIBE ORDER (OUTPATIENT)
Dept: MAMMOGRAPHY | Age: 76
End: 2021-07-27

## 2021-07-27 ENCOUNTER — HOSPITAL ENCOUNTER (OUTPATIENT)
Dept: MAMMOGRAPHY | Age: 76
Discharge: HOME | End: 2021-07-27
Attending: INTERNAL MEDICINE
Payer: MEDICARE

## 2021-07-27 DIAGNOSIS — R92.8 ABNORMALITY OF RIGHT BREAST ON SCREENING MAMMOGRAM: Primary | ICD-10-CM

## 2021-07-27 DIAGNOSIS — R92.8 ABNORMALITY OF RIGHT BREAST ON SCREENING MAMMOGRAM: ICD-10-CM

## 2021-07-27 PROCEDURE — 76642 ULTRASOUND BREAST LIMITED: CPT

## 2021-11-09 ENCOUNTER — OFFICE VISIT (OUTPATIENT)
Dept: INTERNAL MEDICINE CLINIC | Age: 76
End: 2021-11-09
Payer: MEDICARE

## 2021-11-09 VITALS
SYSTOLIC BLOOD PRESSURE: 106 MMHG | DIASTOLIC BLOOD PRESSURE: 70 MMHG | HEART RATE: 65 BPM | WEIGHT: 146.4 LBS | OXYGEN SATURATION: 98 % | BODY MASS INDEX: 26.94 KG/M2 | HEIGHT: 62 IN | TEMPERATURE: 98.4 F | RESPIRATION RATE: 16 BRPM

## 2021-11-09 DIAGNOSIS — Z13.31 SCREENING FOR DEPRESSION: ICD-10-CM

## 2021-11-09 DIAGNOSIS — G57.01 PIRIFORMIS SYNDROME OF RIGHT SIDE: ICD-10-CM

## 2021-11-09 DIAGNOSIS — Z00.00 MEDICARE ANNUAL WELLNESS VISIT, SUBSEQUENT: Primary | ICD-10-CM

## 2021-11-09 DIAGNOSIS — H91.93 BILATERAL HEARING LOSS, UNSPECIFIED HEARING LOSS TYPE: ICD-10-CM

## 2021-11-09 DIAGNOSIS — Z12.31 ENCOUNTER FOR SCREENING MAMMOGRAM FOR MALIGNANT NEOPLASM OF BREAST: ICD-10-CM

## 2021-11-09 DIAGNOSIS — M81.0 AGE-RELATED OSTEOPOROSIS WITHOUT CURRENT PATHOLOGICAL FRACTURE: ICD-10-CM

## 2021-11-09 DIAGNOSIS — I10 ESSENTIAL HYPERTENSION: ICD-10-CM

## 2021-11-09 DIAGNOSIS — E07.9 THYROID DISORDER: ICD-10-CM

## 2021-11-09 DIAGNOSIS — J45.20 RAD (REACTIVE AIRWAY DISEASE), MILD INTERMITTENT, UNCOMPLICATED: ICD-10-CM

## 2021-11-09 DIAGNOSIS — Z13.39 SCREENING FOR ALCOHOLISM: ICD-10-CM

## 2021-11-09 LAB
ALBUMIN SERPL-MCNC: 4.4 G/DL (ref 3.5–5)
ALBUMIN/GLOB SERPL: 1.6 {RATIO} (ref 1.1–2.2)
ALP SERPL-CCNC: 87 U/L (ref 45–117)
ALT SERPL-CCNC: 23 U/L (ref 12–78)
ANION GAP SERPL CALC-SCNC: 4 MMOL/L (ref 5–15)
AST SERPL-CCNC: 20 U/L (ref 15–37)
BILIRUB SERPL-MCNC: 0.9 MG/DL (ref 0.2–1)
BUN SERPL-MCNC: 15 MG/DL (ref 6–20)
BUN/CREAT SERPL: 22 (ref 12–20)
CALCIUM SERPL-MCNC: 9.6 MG/DL (ref 8.5–10.1)
CHLORIDE SERPL-SCNC: 104 MMOL/L (ref 97–108)
CO2 SERPL-SCNC: 29 MMOL/L (ref 21–32)
CREAT SERPL-MCNC: 0.67 MG/DL (ref 0.55–1.02)
GLOBULIN SER CALC-MCNC: 2.8 G/DL (ref 2–4)
GLUCOSE SERPL-MCNC: 98 MG/DL (ref 65–100)
POTASSIUM SERPL-SCNC: 4.3 MMOL/L (ref 3.5–5.1)
PROT SERPL-MCNC: 7.2 G/DL (ref 6.4–8.2)
SODIUM SERPL-SCNC: 137 MMOL/L (ref 136–145)
TSH SERPL DL<=0.05 MIU/L-ACNC: 2.36 UIU/ML (ref 0.36–3.74)

## 2021-11-09 PROCEDURE — G8428 CUR MEDS NOT DOCUMENT: HCPCS | Performed by: INTERNAL MEDICINE

## 2021-11-09 PROCEDURE — G8754 DIAS BP LESS 90: HCPCS | Performed by: INTERNAL MEDICINE

## 2021-11-09 PROCEDURE — 1101F PT FALLS ASSESS-DOCD LE1/YR: CPT | Performed by: INTERNAL MEDICINE

## 2021-11-09 PROCEDURE — G8510 SCR DEP NEG, NO PLAN REQD: HCPCS | Performed by: INTERNAL MEDICINE

## 2021-11-09 PROCEDURE — G8752 SYS BP LESS 140: HCPCS | Performed by: INTERNAL MEDICINE

## 2021-11-09 PROCEDURE — G9899 SCRN MAM PERF RSLTS DOC: HCPCS | Performed by: INTERNAL MEDICINE

## 2021-11-09 PROCEDURE — G0439 PPPS, SUBSEQ VISIT: HCPCS | Performed by: INTERNAL MEDICINE

## 2021-11-09 PROCEDURE — G8536 NO DOC ELDER MAL SCRN: HCPCS | Performed by: INTERNAL MEDICINE

## 2021-11-09 PROCEDURE — 99214 OFFICE O/P EST MOD 30 MIN: CPT | Performed by: INTERNAL MEDICINE

## 2021-11-09 PROCEDURE — 1090F PRES/ABSN URINE INCON ASSESS: CPT | Performed by: INTERNAL MEDICINE

## 2021-11-09 PROCEDURE — G8419 CALC BMI OUT NRM PARAM NOF/U: HCPCS | Performed by: INTERNAL MEDICINE

## 2021-11-09 PROCEDURE — G0463 HOSPITAL OUTPT CLINIC VISIT: HCPCS | Performed by: INTERNAL MEDICINE

## 2021-11-09 PROCEDURE — G9711 PT HX TOT COL OR COLON CA: HCPCS | Performed by: INTERNAL MEDICINE

## 2021-11-09 RX ORDER — ALBUTEROL SULFATE 90 UG/1
AEROSOL, METERED RESPIRATORY (INHALATION)
Qty: 18 G | Refills: 1 | Status: SHIPPED | OUTPATIENT
Start: 2021-11-09

## 2021-11-09 NOTE — PROGRESS NOTES
Diagnoses and all orders for this visit:    1. Medicare annual wellness visit, subsequent  Completed, see below  -     Emmanuel Schultz 86    Colorectal cancer screening tests--evaluated by Dr. Gutierrez Ngo. She has a history of precancerous polyp which resulted in colectomy. Patient has had negative colonoscopies. She was evaluated by Dr. Gutierrez Ngo and she does not need any further screening colonoscopies. Discussed with patient if she did have any change in bowel habits or blood in school she would be reevaluated diagnostically    2. RAD (reactive airway disease), mild intermittent, uncomplicated  Stable  Primarily triggered by change in season/cold symptoms  -     albuterol (Ventolin HFA) 90 mcg/actuation inhaler; USE 2 INHALATIONS EVERY 4 HOURS AS NEEDED FOR WHEEZING OR SHORTNESS OF BREATH    3. Encounter for screening mammogram for malignant neoplasm of breast  Due for mammogram  -     [facility] MAMMO BI SCREENING INCL CAD; Future    4. Screening for alcoholism  No alcohol issues    5. Screening for depression  Denies depression PHQ 2 is 0    6. Essential hypertension  Stable continue meds  -     METABOLIC PANEL, COMPREHENSIVE; Future    7. Thyroid disorder  Stable continue meds  -     TSH 3RD GENERATION; Future    8. Bilateral hearing loss, unspecified hearing loss type  Difficulty hearing lower pitches would like evaluation  -     REFERRAL TO ENT-OTOLARYNGOLOGY    Osteoporosis  Recheck calcium and vitamin D continue for now  Patient fell and did not have a Colles' fracture  Continue meds    Piriformis syndrome  Right gluteal pain, addressed, will try exercises to release her right gluteal.  Exercises discussed and shown to her on AVS.      Chief Complaint   Patient presents with    Annual Wellness Visit    Hip Pain     right hip pain while walking x2 mths      Fall  Patient reports that she was working on a project and was very busy.   About 2 weeks ago she Tripped while getting to phone  Was doing remote call for her work  reight knee and chest pain. She fell face down. She did not fracture or hurt her wrists. Asthma/allergy  Overall stable. She does not use albuterol regularly. She uses albuterol only during flares    Subjective:   Shraddha Schmitz is a 78 y.o. female with hypertension. Hypertension ROS: taking medications as instructed, no medication side effects noted, no TIA's, no chest pain on exertion, no dyspnea on exertion, no swelling of ankles. New concerns: None      SUBJECTIVE: Shraddha Schmitz is a 78 y.o. female here for follow up of hypothyroidism. Lab Results   Component Value Date/Time    TSH 2.54 11/05/2020 02:37 PM     Thyroid ROS: denies fatigue, weight changes, heat/cold intolerance, bowel/skin changes or CVS symptoms. Osteoporosis  She has been taking Fosamax and not had any side effects. She has been taking her calcium and and vitamin D.  Level from 2020 reviewed and 64. She is taking 5000 international units 2-3 times per week. Right chest   Status post fall. She had other rib symptoms but improving.       Past Medical History:   Diagnosis Date    Asthma     Autoimmune disease ([facility] [address]     Hashimotos thyroiditis    Cataract     dr. Kiko Madera Hypertension     Ill-defined condition     slow heart rate - asymptomatic    Joint pain     feet and hips     Precancerous lesion 2003    Colon, resection - high risk adenoma upper part of the descending colon     Past Surgical History:   Procedure Laterality Date    COLONOSCOPY N/A 6/20/2016    COLONOSCOPY performed by Liane Matta MD at [address] [place] [address]  N/A 6/21/2021    COL[facility] at [facility]COPY    HX BREAST BIOPSY Bilateral     negative    HX CATARACT REMOVAL Bilateral     HX COLONOSCOPY      partial colon removed   colonscopy q 5 years     HX DILATION AND CURETTAGE      X2 after miscarriages    HX LAPAROSCOPIC SUPRACERVICAL HYSTERECTOMY  2018    with BSO/DAVINCI    HX OTHER SURGICAL      mole removed from shoulder - benign     HX SACROCOLPOPEXY  2018    HX TONSIL AND ADENOIDECTOMY      HX TOTAL COLECTOMY  2003    partial, appr 6\" according to patient, heavily transformed adenoma    HX UROLOGICAL  2018    Urodynamics    HX UROLOGICAL  2018    TVT     Social History     Socioeconomic History    Marital status:    Tobacco Use    Smoking status: Former Smoker     Quit date: 1969     Years since quittin.7    Smokeless tobacco: Never Used    Tobacco comment: stopped    Vaping Use    Vaping Use: Never used   Substance and Sexual Activity    Alcohol use: Yes     Comment: very occasionally, social-none in past several weeks    Drug use: No    Sexual activity: Never     Partners: Male   Social History Narrative    Working part time for [employer]    manageble stress            Not     Children: 2 sons healthy    2 grandchildren healthy so far     Family History   Problem Relation Age of Onset    Cancer Mother         cervical cancer    Stroke Mother         hemorhagic    Diabetes Father     Hypertension Father         mva    Liver Disease Sister         passed 2015    Post-op Nausea/Vomiting Son      Current Outpatient Medications   Medication Sig Dispense Refill    aspirin delayed-release 81 mg tablet Take  by mouth daily.  CALCIUM-VITAMIN D3 PO Take 1 Tablet by mouth two (2) times a day. 330 mg/200 units      cholecalciferol (VITAMIN D3) (5000 Units /125 mcg) capsule Take 5,000 Units by mouth three (3) days a week.  polyethylene glycol (Miralax) 17 gram/dose powder Take 17 g by mouth as needed for Constipation.       losartan (COZAAR) 25 mg tablet TAKE 1 TABLET DAILY 90 Tab 3    alendronate (FOSAMAX) 70 mg tablet TAKE 1 TABLET EVERY 7 DAYS 12 Tab 3    levothyroxine (SYNTHROID) 112 mcg tablet TAKE 1 TABLET DAILY BEFORE BREAKFAST FOR HYPOTHYROIDISM (NEED LABS AND APPOINTMENT) 90 Tab 3    Ventolin HFA 90 mcg/actuation inhaler USE 2 INHALATIONS EVERY 4 HOURS AS NEEDED FOR WHEEZING OR SHORTNESS OF BREATH 18 g 10    vit C/E/Zn/coppr/lutein/zeaxan (PRESERVISION AREDS-2 PO) Take  by mouth. Takes one po twice daily.        No Known Allergies    Review of Systems - General ROS: negative for - chills or fever  Cardiovascular ROS: no chest pain or dyspnea on exertion  Respiratory ROS: no cough, shortness of breath, or wheezing    Visit Vitals  /70 (BP 1 Location: Left upper arm, BP Patient Position: Sitting)   Pulse 65   Temp 98.4 °F (36.9 °C) (Oral)   Resp 16   Ht 5' 2\" (1.575 m)   Wt 146 lb 6.4 oz (66.4 kg)   SpO2 98%   BMI 26.78 kg/m²     Constitutional: [x] Appears well-developed and well-nourished [x] No apparent distress      [] Abnormal -     Mental status: [x] Alert and awake  [x] Oriented to person/place/time [x] Able to follow commands    [] Abnormal -     Eyes:   EOM    [x]  Normal    [] Abnormal -   Sclera  [x]  Normal    [] Abnormal -          Discharge [x]  None visible   [] Abnormal -     HENT: [x] Normocephalic, atraumatic  [] Abnormal -   [x] Mouth/Throat: Mucous membranes are moist    External Ears [x] Normal  [] Abnormal -    Neck: [x] No visualized mass [] Abnormal -     Pulmonary/Chest: [x] Respiratory effort normal   [x] No visualized signs of difficulty breathing or respiratory distress        [] Abnormal -      Musculoskeletal:   [x] Normal gait with no signs of ataxia         [x] Normal range of motion of neck        [] Abnormal -     Neurological:        [x] No Facial Asymmetry (Cranial nerve 7 motor function) (limited exam due to video visit)          [x] No gaze palsy        [] Abnormal -          Skin:        [x] No significant exanthematous lesions or discoloration noted on facial skin         [] Abnormal -            Psychiatric:       [x] Normal Affect [] Abnormal -        [x] No Hallucinations      ATTENTION:   This medical record was transcribed using an electronic medical records/speech recognition system. Although proofread, it may and can contain electronic, spelling and other errors. Corrections may be executed at a later time. Please contact us for any clarifications as needed. Aside from patient's Medicare visit on this date 11/09/21  I have spent 30 minutes reviewing previous notes, test results and face to face with the patient discussing the diagnosis and importance of compliance with the treatment plan as well as documenting on the day of the visit. This is the Subsequent Medicare Annual Wellness Exam, performed 12 months or more after the Initial AWV or the last Subsequent AWV    I have reviewed the patient's medical history in detail and updated the computerized patient record. Assessment/Plan   Education and counseling provided:  Are appropriate based on today's review and evaluation  End-of-Life planning (with patient's consent)  Pneumococcal Vaccine  Screening Mammography  Colorectal cancer screening tests--evaluated by Dr. Riki Larose. She has a history of precancerous polyp which resulted in colectomy. Patient has had negative colonoscopies. She was evaluated by Dr. Riki Larose and she does not need any further screening colonoscopies.   Discussed with patient if she did have any change in bowel habits or blood in school she would be reevaluated diagnostically  Bone mass measurement (DEXA) continue Fosamax  Screening for glaucoma follow-up with Dr. Rina Grant Screening     3 most recent PHQ Screens 11/9/2021   Little interest or pleasure in doing things Not at all   Feeling down, depressed, irritable, or hopeless Not at all   Total Score PHQ 2 0       Alcohol Risk Screen    Do you average more than 1 drink per night or more than 7 drinks a week:  No    On any one occasion in the past three months have you have had more than 3 drinks containing alcohol:  No        Functional Ability and Level of Safety    Hearing: Hearing is good. Activities of Daily Living: The home contains: no safety equipment. Patient does total self care      Ambulation: with no difficulty     Fall Risk:  Fall Risk Assessment, last 12 mths 11/9/2021   Able to walk? Yes   Fall in past 12 months? 1   Do you feel unsteady? 0   Are you worried about falling 0   Is the gait abnormal? 0   Number of falls in past 12 months 1   Fall with injury? 0      Abuse Screen:  Patient is not abused       Cognitive Screening    Has your family/caregiver stated any concerns about your memory: no     Cognitive Screening: Normal - Verbal Fluency Test    Health Maintenance Due   There are no preventive care reminders to display for this patient.     Patient Care Team   Patient Care Team:  Niya Blair MD as PCP - General (Internal Medicine)  Niya Blair MD as PCP - [facility] Empaneled Provider    History     Patient Active Problem List   Diagnosis Code    Advanced care planning/counseling discussion Z71.89    Cystocele, midline N81.11    Cystocele with rectocele N81.10, N81.6     Past Medical History:   Diagnosis Date    Asthma     Autoimmune disease ([facility] [address]     Hashimotos thyroiditis    Cataract     dr. Nathanael Mendez    Hypertension     Ill-defined condition     slow heart rate - asymptomatic    Joint pain     feet and hips     Precancerous lesion 2003    Colon, resection - high risk adenoma upper part of the descending colon      Past Surgical History:   Procedure Laterality Date    COLONOSCOPY N/A 6/20/2016    COLONOSCOPY performed by Brenda Adam MD at [address] [place] [address]  N/A 6/21/2021    COLON[facility] by [facility]ardo [facility]umann [facility] at [facility] ENDOSCOPY    HX BREAST BIOPSY Bilateral     negative    HX CATARACT REMOVAL Bilateral     HX COLONOSCOPY      partial colon removed   colonscopy q 5 years  HX DILATION AND CURETTAGE      X2 after miscarriages    HX LAPAROSCOPIC SUPRACERVICAL HYSTERECTOMY  2018    with BSO/DAVINCI    HX OTHER SURGICAL      mole removed from shoulder - benign     HX SACROCOLPOPEXY  2018    HX TONSIL AND ADENOIDECTOMY      HX TOTAL COLECTOMY  2003    partial, appr 6\" according to patient, heavily transformed adenoma    HX UROLOGICAL  2018    Urodynamics    HX UROLOGICAL  2018    TVT     Current Outpatient Medications   Medication Sig Dispense Refill    aspirin delayed-release 81 mg tablet Take  by mouth daily.  CALCIUM-VITAMIN D3 PO Take 1 Tablet by mouth two (2) times a day. 330 mg/200 units      cholecalciferol (VITAMIN D3) (5000 Units /125 mcg) capsule Take 5,000 Units by mouth three (3) days a week.  polyethylene glycol (Miralax) 17 gram/dose powder Take 17 g by mouth as needed for Constipation.  losartan (COZAAR) 25 mg tablet TAKE 1 TABLET DAILY 90 Tab 3    alendronate (FOSAMAX) 70 mg tablet TAKE 1 TABLET EVERY 7 DAYS 12 Tab 3    levothyroxine (SYNTHROID) 112 mcg tablet TAKE 1 TABLET DAILY BEFORE BREAKFAST FOR HYPOTHYROIDISM (NEED LABS AND APPOINTMENT) 90 Tab 3    Ventolin HFA 90 mcg/actuation inhaler USE 2 INHALATIONS EVERY 4 HOURS AS NEEDED FOR WHEEZING OR SHORTNESS OF BREATH 18 g 10    vit C/E/Zn/coppr/lutein/zeaxan (PRESERVISION AREDS-2 PO) Take  by mouth. Takes one po twice daily.        No Known Allergies    Family History   Problem Relation Age of Onset    Cancer Mother         cervical cancer    Stroke Mother         hemorhagic   Dossie Elissa Diabetes Father     Hypertension Father         [de-identified]    Liver Disease Sister         passed 2015    Post-op Nausea/Vomiting Son      Social History     Tobacco Use    Smoking status: Former Smoker     Quit date: 1969     Years since quittin.7    Smokeless tobacco: Never Used    Tobacco comment: 1969   Substance Use Topics    Alcohol use: Yes     Comment: very occasionally, social-none in past several weeks         Alli Batres MD

## 2021-11-09 NOTE — PATIENT INSTRUCTIONS
Medicare Wellness Visit, Female     The best way to live healthy is to have a lifestyle where you eat a well-balanced diet, exercise regularly, limit alcohol use, and quit all forms of tobacco/nicotine, if applicable. Regular preventive services are another way to keep healthy. Preventive services (vaccines, screening tests, monitoring & exams) can help personalize your care plan, which helps you manage your own care. Screening tests can find health problems at the earliest stages, when they are easiest to treat. [facility] follows the current, evidence-based guidelines published by the [facility] M[facility]ail Pena ([facility]) when recommending preventive services for our patients. Because we follow these guidelines, sometimes recommendations change over time as research supports it. (For example, mammograms used to be recommended annually. Even though Medicare will still pay for an annual mammogram, the newer guidelines recommend a mammogram every two years for women of average risk). Of course, you and your doctor may decide to screen more often for some diseases, based on your risk and your co-morbidities (chronic disease you are already diagnosed with). Preventive services for you include:  - Medicare offers their members a free annual wellness visit, which is time for you and your primary care provider to discuss and plan for your preventive service needs. Take advantage of this benefit every year!  -All adults over the age of 72 should receive the recommended pneumonia vaccines. Current USPSTF guidelines recommend a series of two vaccines for the best pneumonia protection.   -All adults should have a flu vaccine yearly and a tetanus vaccine every 10 years.   -All adults age 48 and older should receive the shingles vaccines (series of two vaccines).       -All adults age 38-68 who are overweight should have a diabetes screening test once every three years.   -All adults born between [birth year] and 1963 should be screened once for Hepatitis C.  -Other screening tests and preventive services for persons with diabetes include: an eye exam to screen for diabetic retinopathy, a kidney function test, a foot exam, and stricter control over your cholesterol.   -Cardiovascular screening for adults with routine risk involves an electrocardiogram (ECG) at intervals determined by your doctor.   -Colorectal cancer screenings should be done for adults age 54-65 with no increased risk factors for colorectal cancer. There are a number of acceptable methods of screening for this type of cancer. Each test has its own benefits and drawbacks. Discuss with your doctor what is most appropriate for you during your annual wellness visit. The different tests include: colonoscopy (considered the best screening method), a fecal occult blood test, a fecal DNA test, and sigmoidoscopy.    -A bone mass density test is recommended when a woman turns 65 to screen for osteoporosis. This test is only recommended one time, as a screening. Some providers will use this same test as a disease monitoring tool if you already have osteoporosis. -Breast cancer screenings are recommended every other year for women of normal risk, age 54-69.  -Cervical cancer screenings for women over age 72 are only recommended with certain risk factors. Here is a list of your current Health Maintenance items (your personalized list of preventive services) with a due date: There are no preventive care reminders to display for this patient. Piriformis Syndrome: Care Instructions  Your Care Instructions     The piriformis muscle is deep under your rear end (buttock). One end of the muscle connects deep inside the pelvic area, and the other end attaches to the top of the thighbone. This muscle can press on the sciatic nerve that runs from your spine down your leg.  When this happens, you may have pain, numbness, and tingling in the buttock and down the back of your leg. This is called piriformis syndrome. The pain may get worse when you sit for a long time or climb stairs. Also, you may be more likely to develop piriformis syndrome if you run or walk often. Your doctor will check for other causes of your pain before treating this syndrome. Treatment may include stretching exercises, massage, and medicine for the pain and swelling. If these do not help, you may get a shot of steroid medicine. Until the pain is gone, you may need to rest the muscle and limit activities like running. Exercises and a change in how you move and sit may be enough to stop the pressure on the nerve. Follow-up care is a key part of your treatment and safety. Be sure to make and go to all appointments, and call your doctor if you are having problems. It's also a good idea to know your test results and keep a list of the medicines you take. How can you care for yourself at home? · If your doctor thinks that strenuous exercise is causing your problem, stop or cut back on activities such as running. You may find swimming to be a good exercise for a while. · Stretch the piriformis muscle. ? Lie on your back. ? Bend one leg at the knee and keep the other leg flat on the ground. ? Raise your bent knee up and then move it across your body. Hold the outside of the knee with the opposite hand. ? Gently pull the knee with your hand toward the opposite shoulder. ? Hold the stretch for at least 15 to 30 seconds. Switch legs. ? Do the stretch several times each day. · Massage the muscle to relieve pressure. ? Sit on the floor. Lean to one side so that the hip on your sore side is off the ground. Put a tennis ball under your buttock on that side. ? As you put weight onto the tennis ball, you may find spots that are especially sore. Move gently so that the tennis ball gently massages each of the sore spots. · Use ice or heat to help reduce pain.  Put ice or a cold pack or a heating pad set on low or a warm cloth on the sore area for 10 to 20 minutes at a time. Put a thin cloth between the ice pack or heating pad and your skin. · Ask your doctor if you can take an over-the-counter pain medicine, such as acetaminophen (Tylenol), ibuprofen (Advil, Motrin), or naproxen (Aleve). Be safe with medicines. Read and follow all instructions on the label. · Have your doctor or a physical therapist watch how you move. You may need physical therapy or special inserts in your shoes (orthotics) to help you move in a way that does not put pressure on your nerves. When should you call for help? Watch closely for changes in your health, and be sure to contact your doctor if:    · You do not feel better after several weeks of home care.     · Your pain gets worse.     · Your leg becomes weak or numb. Where can you learn more? Go to http://www.gray.com/  Enter C901 in the search box to learn more about \"Piriformis Syndrome: Care Instructions. \"  Current as of: July 1, 2021               Content Version: 13.0  © 3323-9818 [employer]. Care instructions adapted under license by [employer] (which disclaims liability or warranty for this information). If you have questions about a medical condition or this instruction, always ask your healthcare professional. [address] any warranty or liability for your use of this information. Piriformis Syndrome: Exercises  Introduction  Here are some examples of exercises for you to try. The exercises may be suggested for a condition or for rehabilitation. Start each exercise slowly. Ease off the exercises if you start to have pain. You will be told when to start these exercises and which ones will work best for you. How to do the exercises  Hip rotator stretch    1. Lie on your back with both knees bent and your feet flat on the floor.   2. Put the ankle of your affected leg on your opposite thigh near your knee. 3. Use your hand to gently push your knee (on your affected leg) away from your body until you feel a gentle stretch around your hip. 4. Hold the stretch for 15 to 30 seconds. 5. Repeat 2 to 4 times. 6. Switch legs and repeat steps 1 through 5. Piriformis stretch    1. Lie on your back with your legs straight. 2. Lift your affected leg and bend your knee. With your opposite hand, reach across your body, and then gently pull your knee toward your opposite shoulder. 3. Hold the stretch for 15 to 30 seconds. 4. Repeat with your other leg. 5. Repeat 2 to 4 times on each side. Lower abdominal strengthening    1. Lie on your back with your knees bent and your feet flat on the floor. 2. Tighten your belly muscles by pulling your belly button in toward your spine. 3. Lift one foot off the floor and bring your knee toward your chest, so that your knee is straight above your hip and your leg is bent like the letter \"L. \"  4. Lift the other knee up to the same position. 5. Lower one leg at a time to the starting position. 6. Keep alternating legs until you have lifted each leg 8 to 12 times. 7. Be sure to keep your belly muscles tight and your back still as you are moving your legs. Be sure to breathe normally. Follow-up care is a key part of your treatment and safety. Be sure to make and go to all appointments, and call your doctor if you are having problems. It's also a good idea to know your test results and keep a list of the medicines you take. Current as of: July 1, 2021               Content Version: 13.0  © 2006-2021 [employer]. Care instructions adapted under license by [employer] (which disclaims liability or warranty for this information).  If you have questions about a medical condition or this instruction, always ask your healthcare professional. [place] [address] any warranty or liability for your use of this information.

## 2021-11-12 ENCOUNTER — PATIENT MESSAGE (OUTPATIENT)
Dept: INTERNAL MEDICINE CLINIC | Age: 76
End: 2021-11-12

## 2021-11-12 NOTE — TELEPHONE ENCOUNTER
From: Bakari Manuel  To: Shraddha Schmitz  Sent: 11/12/2021 11:34 AM EST  Subject: Prescription    Hi Kareen Ayala,   I received a notice from the pharmacy that your insurance requires a prior authorization before they can dispense the inhaler. Can you please send me you insurance coverage benefit information so I can obtain that authorization. I need the ID number, Grp #, BIN #, PCN # if you have it.    Thanks  Michelle

## 2022-01-12 DIAGNOSIS — E07.9 THYROID DISORDER: ICD-10-CM

## 2022-01-12 RX ORDER — LEVOTHYROXINE SODIUM 112 UG/1
TABLET ORAL
Qty: 90 TABLET | Refills: 3 | Status: SHIPPED | OUTPATIENT
Start: 2022-01-12

## 2022-03-19 PROBLEM — N81.10 CYSTOCELE WITH RECTOCELE: Status: ACTIVE | Noted: 2018-03-01

## 2022-03-19 PROBLEM — N81.6 CYSTOCELE WITH RECTOCELE: Status: ACTIVE | Noted: 2018-03-01

## 2022-03-20 PROBLEM — N81.11 CYSTOCELE, MIDLINE: Status: ACTIVE | Noted: 2018-01-12

## 2022-07-01 ENCOUNTER — TRANSCRIBE ORDER (OUTPATIENT)
Dept: SCHEDULING | Age: 77
End: 2022-07-01

## 2022-07-01 DIAGNOSIS — Z12.31 OTHER SCREENING MAMMOGRAM: Primary | ICD-10-CM

## 2022-07-29 ENCOUNTER — HOSPITAL ENCOUNTER (OUTPATIENT)
Dept: MAMMOGRAPHY | Age: 77
Discharge: HOME | End: 2022-07-29
Attending: INTERNAL MEDICINE
Payer: MEDICARE

## 2022-07-29 DIAGNOSIS — Z12.31 OTHER SCREENING MAMMOGRAM: ICD-10-CM

## 2022-07-29 PROCEDURE — 77063 BREAST TOMOSYNTHESIS BI: CPT

## 2022-09-17 DIAGNOSIS — I10 ESSENTIAL HYPERTENSION: ICD-10-CM

## 2022-09-17 RX ORDER — LOSARTAN POTASSIUM 25 MG/1
TABLET ORAL
Qty: 90 TABLET | Refills: 3 | Status: SHIPPED | OUTPATIENT
Start: 2022-09-17

## 2022-11-10 ENCOUNTER — OFFICE VISIT (OUTPATIENT)
Dept: INTERNAL MEDICINE CLINIC | Age: 77
End: 2022-11-10
Payer: MEDICARE

## 2022-11-10 VITALS
WEIGHT: 142.2 LBS | TEMPERATURE: 97.6 F | HEART RATE: 69 BPM | BODY MASS INDEX: 26.17 KG/M2 | RESPIRATION RATE: 16 BRPM | OXYGEN SATURATION: 99 % | HEIGHT: 62 IN | SYSTOLIC BLOOD PRESSURE: 119 MMHG | DIASTOLIC BLOOD PRESSURE: 77 MMHG

## 2022-11-10 DIAGNOSIS — Z00.00 MEDICARE ANNUAL WELLNESS VISIT, SUBSEQUENT: Primary | ICD-10-CM

## 2022-11-10 DIAGNOSIS — E07.9 THYROID DISORDER: ICD-10-CM

## 2022-11-10 DIAGNOSIS — M81.0 AGE-RELATED OSTEOPOROSIS WITHOUT CURRENT PATHOLOGICAL FRACTURE: ICD-10-CM

## 2022-11-10 DIAGNOSIS — R42 EPISODIC LIGHTHEADEDNESS: ICD-10-CM

## 2022-11-10 DIAGNOSIS — J45.20 RAD (REACTIVE AIRWAY DISEASE), MILD INTERMITTENT, UNCOMPLICATED: ICD-10-CM

## 2022-11-10 DIAGNOSIS — Z12.31 ENCOUNTER FOR SCREENING MAMMOGRAM FOR MALIGNANT NEOPLASM OF BREAST: ICD-10-CM

## 2022-11-10 PROCEDURE — G8427 DOCREV CUR MEDS BY ELIG CLIN: HCPCS | Performed by: INTERNAL MEDICINE

## 2022-11-10 PROCEDURE — 1090F PRES/ABSN URINE INCON ASSESS: CPT | Performed by: INTERNAL MEDICINE

## 2022-11-10 PROCEDURE — G8510 SCR DEP NEG, NO PLAN REQD: HCPCS | Performed by: INTERNAL MEDICINE

## 2022-11-10 PROCEDURE — G8536 NO DOC ELDER MAL SCRN: HCPCS | Performed by: INTERNAL MEDICINE

## 2022-11-10 PROCEDURE — G8417 CALC BMI ABV UP PARAM F/U: HCPCS | Performed by: INTERNAL MEDICINE

## 2022-11-10 PROCEDURE — G0439 PPPS, SUBSEQ VISIT: HCPCS | Performed by: INTERNAL MEDICINE

## 2022-11-10 PROCEDURE — 1101F PT FALLS ASSESS-DOCD LE1/YR: CPT | Performed by: INTERNAL MEDICINE

## 2022-11-10 PROCEDURE — 93010 ELECTROCARDIOGRAM REPORT: CPT | Performed by: INTERNAL MEDICINE

## 2022-11-10 PROCEDURE — 93005 ELECTROCARDIOGRAM TRACING: CPT | Performed by: INTERNAL MEDICINE

## 2022-11-10 PROCEDURE — G0463 HOSPITAL OUTPT CLINIC VISIT: HCPCS | Performed by: INTERNAL MEDICINE

## 2022-11-10 PROCEDURE — 99214 OFFICE O/P EST MOD 30 MIN: CPT | Performed by: INTERNAL MEDICINE

## 2022-11-10 PROCEDURE — G9711 PT HX TOT COL OR COLON CA: HCPCS | Performed by: INTERNAL MEDICINE

## 2022-11-10 RX ORDER — ALBUTEROL SULFATE 90 UG/1
AEROSOL, METERED RESPIRATORY (INHALATION)
Qty: 18 G | Refills: 1 | Status: SHIPPED | OUTPATIENT
Start: 2022-11-10

## 2022-11-10 NOTE — PROGRESS NOTES
Diagnoses and all orders for this visit:    1. Medicare annual wellness visit, subsequent  Medicare visit completed please see below    2. RAD (reactive airway disease), mild intermittent, uncomplicated  Episodic reactive airway  Currently no symptoms  Will offer albuterol in case she needs in the future for as needed flares  -     albuterol (Ventolin HFA) 90 mcg/actuation inhaler; USE 2 INHALATIONS EVERY 4 HOURS AS NEEDED FOR WHEEZING OR SHORTNESS OF BREATH    3. Age-related osteoporosis without current pathological fracture  Not controlled   has been on Fosamax for 2 years  Her last bone density was in 2021 her next one will be in 2023 or 2024  Fell on right hand and no fracture  Continue Fosamax-     METABOLIC PANEL, COMPREHENSIVE; Future    4. Thyroid disorder  Stable  -     TSH 3RD GENERATION; Future    5. Episodic lightheadedness  Episodic periods of presyncope not controlled  Discussed with patient and possibly vasovagal syncope precipitated by stress or emotional/conversations  Will do EKG the 1 she had from her apple watch had aberrant baseline but I did not see any pauses  Will monitor symptoms and if continues we will have her see cardiology for monitor  Denies palpitations or skipped beats  -     AMB POC EKG ROUTINE W/ 12 LEADS, INTER & REP  -     LIPID PANEL; Future    6. Encounter for screening mammogram for malignant neoplasm of breast  Due for mammogram  2022-     [facility] MAMMO BI SCREENING INCL CAD; Future         Chief Complaint   Patient presents with    Annual Wellness Visit       Patient presents for her Medicare wellness visit. She is also here to follow-up with regards to her thyroid, fall on her right hand and she later mentions she has had some episodic lightheadedness      SUBJECTIVE: Colin Tafoya is a 78 y.o. female here for follow up of hypothyroidism.   Lab Results   Component Value Date/Time    TSH 2.36 11/09/2021 10:19 AM     Thyroid ROS: denies fatigue, weight changes, heat/cold intolerance, bowel/skin changes or CVS symptoms. Osteoporosis  2021 bone density reviewed. She thinks she takes her vitamin D but not consistently. She also takes calcium about 1000 mg/day. She notes that she usually takes her PreserVision and if is regular about this is regular at her other vitamins. She had a fall on her right hand. She had a misstep and there was a piece of concrete that had a heaped area and her toe hit it. She fell on her right hand and chin. She did not fracture her hand but had a sprain. She was seen by Dr. Senthil Casanova orthopedics and was told she had a hand sprain. She is currently on Fosamax and no side effects on this. Lightheadedness  Patient reports that she was with her friends and was standing up. During the conversation one of her friends mentioned that she was having multiple biopsies on her colon. Patient started to feel faint given her own experience with the colon and requested to sit down. Her friends made her get an EKG which was reviewed today. She noted since she sat down her symptoms improved. Patient further adds that when she was younger she had multiple episodes of near syncope type episodes. In the past she would have more profuse sweating and recalls riding a bike to [employer], carrying apples and profusely sweating during the episode. She notes that when she was with her friends recently she started to feels faint and got sweaty on the forehead. She did not have any period of loss of consciousness with any of her prior episodes or her most recent 1. She denies chest pain shortness of breath or palpitations. She denies history of skipped beat.     Past Medical History:   Diagnosis Date    Asthma     Autoimmune disease ([facility] [address]     Hashimotos thyroiditis    Cataract     dr. Lula Lewis    Ill-defined condition     slow heart rate - asymptomatic    Joint pain     feet and hips     Precancerous lesion 2003    Colon, resection - high risk adenoma upper part of the descending colon     Past Surgical History:   Procedure Laterality Date    COLONOSCOPY N/A 2016    COLONOSCOPY performed by Lalo Arriola MD at [address] [place] [address] N/A 2021    COLONOSCOPY performed by Shaun Everett MD at [facility] ENDOSCOPY    HX BREAST BIOPSY Bilateral     negative    HX CATARACT REMOVAL Bilateral     HX COLONOSCOPY      partial colon removed   colonscopy q 5 years     HX DILATION AND CURETTAGE      X2 after miscarriages    HX LAPAROSCOPIC SUPRACERVICAL HYSTERECTOMY  2018    with BSO/DAVINCI    HX OTHER SURGICAL      mole removed from shoulder - benign     HX SACROCOLPOPEXY  2018    HX TONSIL AND ADENOIDECTOMY      HX TOTAL COLECTOMY  2003    partial, appr 6\" according to patient, heavily transformed adenoma    HX UROLOGICAL  2018    Urodynamics    HX UROLOGICAL  2018    TVT     Social History     Socioeconomic History    Marital status:    Tobacco Use    Smoking status: Former     Types: Cigarettes     Quit date: 1969     Years since quittin.7    Smokeless tobacco: Never    Tobacco comments:     stopped    Vaping Use    Vaping Use: Never used   Substance and Sexual Activity    Alcohol use: Yes     Comment: very occasionally, social-none in past several weeks    Drug use: No    Sexual activity: Never     Partners: Male   Social History Narrative    Working part time for [employer]    manageble stress            Not     Children: 2 sons healthy    5 grandchildren healthy so far 5,10, 6 , 4 3 months     Family History   Problem Relation Age of Onset    Cancer Mother         cervical cancer    Stroke Mother         hemorhagic    Diabetes Father     Hypertension Father         mva    Liver Disease Sister         passed 2015    Post-op Nausea/Vomiting Son      Current Outpatient Medications   Medication Sig Dispense Refill    diclofenac sodium (VOLTAREN PO) Apply  to affected area as needed. alendronate (FOSAMAX) 70 mg tablet Take 1 Tablet by mouth every seven (7) days. Give  tablet with 8 ounces of  Water 30 minutes before breakfast.  Do not lie down for at least 30 minutes after administration and until after the first food of the day. please cont vit d supplementation 12 Tablet 3    levothyroxine (SYNTHROID) 112 mcg tablet TAKE 1 TABLET DAILY BEFORE BREAKFAST FOR HYPOTHYROIDISM (NEED LABS AND APPOINTMENT) 90 Tablet 3    albuterol (Ventolin HFA) 90 mcg/actuation inhaler USE 2 INHALATIONS EVERY 4 HOURS AS NEEDED FOR WHEEZING OR SHORTNESS OF BREATH 18 g 1    aspirin delayed-release 81 mg tablet Take  by mouth daily. CALCIUM-VITAMIN D3 PO Take 1 Tablet by mouth two (2) times a day. 330 mg/200 units      cholecalciferol (VITAMIN D3) (5000 Units /125 mcg) capsule Take 5,000 Units by mouth three (3) days a week. vit C/E/Zn/coppr/lutein/zeaxan (PRESERVISION AREDS-2 PO) Take  by mouth. Takes one po twice daily.        No Known Allergies    Review of Systems - General ROS: negative for - chills or fever  Cardiovascular ROS: no chest pain or dyspnea on exertion  Respiratory ROS: no cough, shortness of breath, or wheezing    Visit Vitals  /77 (BP 1 Location: Left upper arm, BP Patient Position: Sitting, BP Cuff Size: Adult)   Pulse 69   Temp 97.6 °F (36.4 °C) (Oral)   Resp 16   Ht 5' 2\" (1.575 m)   Wt 142 lb 3.2 oz (64.5 kg)   SpO2 99%   BMI 26.01 kg/m²     Constitutional: [x] Appears well-developed and well-nourished [x] No apparent distress      [] Abnormal -     Mental status: [x] Alert and awake  [x] Oriented to person/place/time [x] Able to follow commands    [] Abnormal -     Eyes:   EOM    [x]  Normal    [] Abnormal -   Sclera  [x]  Normal    [] Abnormal -          Discharge [x]  None visible   [] Abnormal -     HENT: [x] Normocephalic, atraumatic  [] Abnormal -   [x] Mouth/Throat: Mucous membranes are moist    External Ears [x] Normal  [] Abnormal -    Neck: [x] No visualized mass [] Abnormal -     Pulmonary/Chest: [x] Respiratory effort normal   [x] No visualized signs of difficulty breathing or respiratory distress        [] Abnormal -      Musculoskeletal:   [x] Normal gait with no signs of ataxia         [x] Normal range of motion of neck        [] Abnormal -     Neurological:        [x] No Facial Asymmetry (Cranial nerve 7 motor function) (limited exam due to video visit)          [x] No gaze palsy        [] Abnormal -          Skin:        [x] No significant exanthematous lesions or discoloration noted on facial skin         [] Abnormal -            Psychiatric:       [x] Normal Affect [] Abnormal -        [x] No Hallucinations                                                                     This is the Subsequent Medicare Annual Wellness Exam, performed 12 months or more after the Initial AWV or the last Subsequent AWV    I have reviewed the patient's medical history in detail and updated the computerized patient record. Assessment/Plan   Education and counseling provided:  Are appropriate based on today's review and evaluation  End-of-Life planning (with patient's consent)  Pneumococcal Vaccine    1. Medicare annual wellness visit, subsequent  2. RAD (reactive airway disease), mild intermittent, uncomplicated  The following orders have not been finalized:  -     albuterol (Ventolin HFA) 90 mcg/actuation inhaler  3. Age-related osteoporosis without current pathological fracture  4. Thyroid disorder  5. Syncope, unspecified syncope type  6. Encounter for screening mammogram for malignant neoplasm of breast  -     [facility] MAMMO BI SCREENING INCL CAD;  Future       Depression Risk Factor Screening     3 most recent PHQ Screens 11/10/2022   Little interest or pleasure in doing things Not at all   Feeling down, depressed, irritable, or hopeless Not at all   Total Score PHQ 2 0       Alcohol & Drug Abuse Risk Screen    Do you average more than 1 drink per night or more than 7 drinks a week:  No    On any one occasion in the past three months have you have had more than 3 drinks containing alcohol:  No          Functional Ability and Level of Safety    Hearing: Hearing is good. Activities of Daily Living: The home contains: no safety equipment. Patient does total self care      Ambulation: with no difficulty     Fall Risk:  Fall Risk Assessment, last 12 mths 11/10/2022   Able to walk? Yes   Fall in past 12 months? 1   Do you feel unsteady? 0   Are you worried about falling 0   Is the gait abnormal? -   Number of falls in past 12 months -   Fall with injury?  -      Abuse Screen:  Patient is not abused       Cognitive Screening    Has your family/caregiver stated any concerns about your memory: no     Cognitive Screening: Normal - Verbal Fluency Test    Health Maintenance Due     Health Maintenance Due   Topic Date Due    Depression Screen  11/09/2022       Patient Care Team   Patient Care Team:  Isabella Sutherland MD as PCP - General (Internal Medicine Physician)  Isabella Sutherland MD as PCP - [facility] Empaneled Provider    History     Patient Active Problem List   Diagnosis Code    Advanced care planning/counseling discussion Z71.89    Cystocele, midline N81.11    Cystocele with rectocele N81.10, N81.6     Past Medical History:   Diagnosis Date    Asthma     Autoimmune disease ([facility] [address]     Hashimotos thyroiditis    Cataract     dr. Troy Atkins    Ill-defined condition     slow heart rate - asymptomatic    Joint pain     feet and hips     Precancerous lesion 2003    Colon, resection - high risk adenoma upper part of the descending colon      Past Surgical History:   Procedure Laterality Date    COLONOSCOPY N/A 6/20/2016    COLONOSCOPY performed by Delma Lindo MD at [address] [place] [address] N/A 6/21/2021    COLONOSCOPY performed by Smita Bhardwaj MD at [facility] ENDOSCOPY    HX BREAST BIOPSY Bilateral     negative    HX CATARACT REMOVAL Bilateral     HX COLONOSCOPY partial colon removed   colonscopy q 5 years     HX DILATION AND CURETTAGE      X2 after miscarriages    HX LAPAROSCOPIC SUPRACERVICAL HYSTERECTOMY  03/01/2018    with BSO/DAVINCI    HX OTHER SURGICAL      mole removed from shoulder - benign     HX SACROCOLPOPEXY  03/01/2018    HX TONSIL AND ADENOIDECTOMY      HX TOTAL COLECTOMY  2003    partial, appr 6\" according to patient, heavily transformed adenoma    HX UROLOGICAL  01/12/2018    Urodynamics    HX UROLOGICAL  03/01/2018    TVT     Current Outpatient Medications   Medication Sig Dispense Refill    diclofenac sodium (VOLTAREN PO) Apply  to affected area as needed. alendronate (FOSAMAX) 70 mg tablet Take 1 Tablet by mouth every seven (7) days. Give  tablet with 8 ounces of  Water 30 minutes before breakfast.  Do not lie down for at least 30 minutes after administration and until after the first food of the day. please cont vit d supplementation 12 Tablet 3    levothyroxine (SYNTHROID) 112 mcg tablet TAKE 1 TABLET DAILY BEFORE BREAKFAST FOR HYPOTHYROIDISM (NEED LABS AND APPOINTMENT) 90 Tablet 3    albuterol (Ventolin HFA) 90 mcg/actuation inhaler USE 2 INHALATIONS EVERY 4 HOURS AS NEEDED FOR WHEEZING OR SHORTNESS OF BREATH 18 g 1    aspirin delayed-release 81 mg tablet Take  by mouth daily. CALCIUM-VITAMIN D3 PO Take 1 Tablet by mouth two (2) times a day. 330 mg/200 units      cholecalciferol (VITAMIN D3) (5000 Units /125 mcg) capsule Take 5,000 Units by mouth three (3) days a week. vit C/E/Zn/coppr/lutein/zeaxan (PRESERVISION AREDS-2 PO) Take  by mouth. Takes one po twice daily.        No Known Allergies    Family History   Problem Relation Age of Onset    Cancer Mother         cervical cancer    Stroke Mother         hemorhagic    Diabetes Father     Hypertension Father         mva    Liver Disease Sister         passed January 2015    Post-op Nausea/Vomiting Son      Social History     Tobacco Use    Smoking status: Former     Types: Cigarettes Quit date: 1969     Years since quittin.7    Smokeless tobacco: Never    Tobacco comments:     stopped    Substance Use Topics    Alcohol use: Yes     Comment: very occasionally, social-none in past several weeks         Debra Guo MD     This medical record was transcribed using an electronic medical records/speech recognition system. Although proofread, it may and can contain electronic, spelling and other errors. Corrections may be executed at a later time. Please contact us for any clarifications as needed. Aside from patient's Medicare visit on this date 11/10/22  I have spent 30  minutes reviewing previous notes, test results and face to face with the patient discussing the diagnosis and importance of compliance with the treatment plan as well as documenting on the day of the visit.

## 2022-11-10 NOTE — PATIENT INSTRUCTIONS

## 2022-11-11 LAB
ALBUMIN SERPL-MCNC: 4.4 G/DL (ref 3.5–5)
ALBUMIN/GLOB SERPL: 1.8 {RATIO} (ref 1.1–2.2)
ALP SERPL-CCNC: 73 U/L (ref 45–117)
ALT SERPL-CCNC: 27 U/L (ref 12–78)
ANION GAP SERPL CALC-SCNC: 5 MMOL/L (ref 5–15)
AST SERPL-CCNC: 19 U/L (ref 15–37)
BILIRUB SERPL-MCNC: 1 MG/DL (ref 0.2–1)
BUN SERPL-MCNC: 16 MG/DL (ref 6–20)
BUN/CREAT SERPL: 26 (ref 12–20)
CALCIUM SERPL-MCNC: 9 MG/DL (ref 8.5–10.1)
CHLORIDE SERPL-SCNC: 107 MMOL/L (ref 97–108)
CHOLEST SERPL-MCNC: 177 MG/DL
CO2 SERPL-SCNC: 29 MMOL/L (ref 21–32)
CREAT SERPL-MCNC: 0.62 MG/DL (ref 0.55–1.02)
GLOBULIN SER CALC-MCNC: 2.5 G/DL (ref 2–4)
GLUCOSE SERPL-MCNC: 89 MG/DL (ref 65–100)
HDLC SERPL-MCNC: 69 MG/DL
HDLC SERPL: 2.6 {RATIO} (ref 0–5)
LDLC SERPL CALC-MCNC: 98 MG/DL (ref 0–100)
POTASSIUM SERPL-SCNC: 4 MMOL/L (ref 3.5–5.1)
PROT SERPL-MCNC: 6.9 G/DL (ref 6.4–8.2)
SODIUM SERPL-SCNC: 141 MMOL/L (ref 136–145)
TRIGL SERPL-MCNC: 50 MG/DL (ref ?–150)
TSH SERPL DL<=0.05 MIU/L-ACNC: 0.44 UIU/ML (ref 0.36–3.74)
VLDLC SERPL CALC-MCNC: 10 MG/DL

## 2022-11-15 ENCOUNTER — PATIENT MESSAGE (OUTPATIENT)
Dept: INTERNAL MEDICINE CLINIC | Age: 77
End: 2022-11-15

## 2022-11-15 NOTE — TELEPHONE ENCOUNTER
From: Nini Haider  To: Gregory Dunn MD  Sent: 11/15/2022 12:56 PM EST  Subject: Test results     I would like to know what I should be doing about my test results that are not in the normal range. I have greatly increased my water intake because this appears to be a necessary step for kidney function test results. What else should I be doing other than avoiding alcohol and reducing my salmon intake for the high HDL? Also, I have three mammogram schedules in my chart and all are for less than a year from my previous mammogram, I may have misread the orders. Am I to have another mammogram in less than a year from the last?    Thank you for you help.

## 2023-01-13 ENCOUNTER — TRANSCRIBE ORDER (OUTPATIENT)
Dept: SCHEDULING | Age: 78
End: 2023-01-13

## 2023-01-13 DIAGNOSIS — Z12.31 VISIT FOR SCREENING MAMMOGRAM: Primary | ICD-10-CM

## 2023-02-13 ENCOUNTER — HOSPITAL ENCOUNTER (OUTPATIENT)
Dept: GENERAL RADIOLOGY | Age: 78
Discharge: HOME | End: 2023-02-13
Attending: INTERNAL MEDICINE
Payer: MEDICARE

## 2023-02-13 ENCOUNTER — OFFICE VISIT (OUTPATIENT)
Dept: INTERNAL MEDICINE CLINIC | Age: 78
End: 2023-02-13
Attending: INTERNAL MEDICINE
Payer: MEDICARE

## 2023-02-13 VITALS
HEART RATE: 67 BPM | SYSTOLIC BLOOD PRESSURE: 134 MMHG | HEIGHT: 62 IN | BODY MASS INDEX: 26.06 KG/M2 | WEIGHT: 141.6 LBS | DIASTOLIC BLOOD PRESSURE: 80 MMHG | RESPIRATION RATE: 16 BRPM | TEMPERATURE: 98.1 F | OXYGEN SATURATION: 98 %

## 2023-02-13 DIAGNOSIS — M85.80 OSTEOPENIA AFTER MENOPAUSE: ICD-10-CM

## 2023-02-13 DIAGNOSIS — M89.8X6 PAIN OF RIGHT TIBIA: Primary | ICD-10-CM

## 2023-02-13 DIAGNOSIS — M89.8X6 PAIN OF RIGHT TIBIA: ICD-10-CM

## 2023-02-13 DIAGNOSIS — Z78.0 OSTEOPENIA AFTER MENOPAUSE: ICD-10-CM

## 2023-02-13 PROCEDURE — G8399 PT W/DXA RESULTS DOCUMENT: HCPCS | Performed by: INTERNAL MEDICINE

## 2023-02-13 PROCEDURE — G8417 CALC BMI ABV UP PARAM F/U: HCPCS | Performed by: INTERNAL MEDICINE

## 2023-02-13 PROCEDURE — 99214 OFFICE O/P EST MOD 30 MIN: CPT | Performed by: INTERNAL MEDICINE

## 2023-02-13 PROCEDURE — G8427 DOCREV CUR MEDS BY ELIG CLIN: HCPCS | Performed by: INTERNAL MEDICINE

## 2023-02-13 PROCEDURE — G9711 PT HX TOT COL OR COLON CA: HCPCS | Performed by: INTERNAL MEDICINE

## 2023-02-13 PROCEDURE — G0463 HOSPITAL OUTPT CLINIC VISIT: HCPCS | Performed by: INTERNAL MEDICINE

## 2023-02-13 PROCEDURE — 1101F PT FALLS ASSESS-DOCD LE1/YR: CPT | Performed by: INTERNAL MEDICINE

## 2023-02-13 PROCEDURE — 73590 X-RAY EXAM OF LOWER LEG: CPT

## 2023-02-13 PROCEDURE — G8536 NO DOC ELDER MAL SCRN: HCPCS | Performed by: INTERNAL MEDICINE

## 2023-02-13 PROCEDURE — 1090F PRES/ABSN URINE INCON ASSESS: CPT | Performed by: INTERNAL MEDICINE

## 2023-02-13 PROCEDURE — G8510 SCR DEP NEG, NO PLAN REQD: HCPCS | Performed by: INTERNAL MEDICINE

## 2023-02-13 RX ORDER — IBUPROFEN 100 MG/1
10 TABLET, CHEWABLE ORAL
COMMUNITY

## 2023-02-13 NOTE — PROGRESS NOTES
Diagnoses and all orders for this visit:    1. Pain of right tibia  History of osteoporosis rule out tibial fracture  Has been seen by podiatry Dr. Jac Osman and per patient not a good operable candidate for bunions due to her level of osteopenia/osteoporosis  We will have physical therapy evaluate  Discussed at length with patient and engage in other activities to offload her right ankle  Stop walking for at least a week but explore potentially other activities to keep her active. She will work with [employer]  possibly  -     XR TIB/FIB RT; Future  -     REFERRAL TO PHYSICAL THERAPY    2. Osteopenia after menopause  Due for bone density  Had a 9-month pause of Fosamax due to dental work   -     DEXA BONE DENSITY STUDY AXIAL; Future  Continue Fosamax for total of 5 years  Continue calcium and vitamin D       Walks about 3 miles/day  Weights on her own  [employer] before Cov[employer] Feb 6 pain on anterior right tibia  2/10 February 10 woke up with excrutiating pain nght lower ankle voltren cream no hel, took 200 mg ibuprofen impvoed  No curtues cnoavil        Chief Complaint   Patient presents with    Leg Pain     Right lower leg; swelling; started around a week ago       Right lower leg pain  Patient has been walking about 3 miles per day for several years. She also does weights but does not walk with her weights  She is a [employer] and works out when the weather is bad  She has been trying to walk but had to stop due to persistent pain that has been more progressive  She notes last week she felt some pain on February 6. On February 10 her pain became excruciating such that she had to use 200 mg ibuprofen. The ibuprofen helped her symptoms. Given some improvement she started to walk again but then had to stop due to pain.   She was seen by podiatry Dr. Jac Osman a few years ago and was determined to not be an operable candidate for her bunionectomy due to her osteopenia/osteoporosis. Osteoporosis  Had tooth implant  Stopped for about 9 months during tooth procedure before and after        She is also here to follow-up with regards to her thyroid, fall on her right hand and she later mentions she has had some episodic lightheadedness    SUBJECTIVE: Girish Meza is a 78 y.o. female here for follow up of hypothyroidism. Lab Results   Component Value Date/Time    TSH 0.44 11/10/2022 12:50 PM     Thyroid ROS: denies fatigue, weight changes, heat/cold intolerance, bowel/skin changes or CVS symptoms.          Lightheadedness  Episodic and very infrequent now  He does not have any chest pain shortness of breath or loss of consciousness  Improved regarding decrease in frequency since last visit    Past Medical History:   Diagnosis Date    Asthma     Autoimmune disease ([facility] [address]     Hashimotos thyroiditis    Cataract     dr. [facility]    Ill-defined condition     slow heart rate - asymptomatic    Joint pain     feet and hips     Precancerous lesion 2003    Colon, resection - high risk adenoma upper part of the descending colon     Past Surgical History:   Procedure Laterality Date    COLONOSCOPY N/A 6/20/2016    COLONOSCOPY performed by Colten Juarez MD at [address] [place] [address] N/A 6/21/2021    COLONOSCOPY performed by Ayla Dean MD at [facility] ENDOSCOPY    HX BREAST BIOPSY Bilateral     negative    HX CATARACT REMOVAL Bilateral     HX COLONOSCOPY      partial colon removed   colonscopy q 5 years     HX DILATION AND CURETTAGE      X2 after miscarriages    HX LAPAROSCOPIC SUPRACERVICAL HYSTERECTOMY  03/01/2018    with BSO/DAVINCI    HX OTHER SURGICAL      mole removed from shoulder - benign     HX SACROCOLPOPEXY  03/01/2018    HX TONSIL AND ADENOIDECTOMY      HX TOTAL COLECTOMY  2003    partial, appr 6\" according to patient, heavily transformed adenoma    HX UROLOGICAL  01/12/2018    Urodynamics    HX UROLOGICAL  03/01/2018    TVT     Social History Socioeconomic History    Marital status:    Tobacco Use    Smoking status: Former     Types: Cigarettes     Quit date: 1969     Years since quittin.0    Smokeless tobacco: Never    Tobacco comments:     stopped    Vaping Use    Vaping Use: Never used   Substance and Sexual Activity    Alcohol use: Yes     Comment: very occasionally, social-none in past several weeks    Drug use: No    Sexual activity: Never     Partners: Male   Social History Narrative    Working part time for [employer], [employer]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 levothyroxine (SYNTHROID) 112 mcg tablet TAKE 1 TABLET DAILY BEFORE BREAKFAST. If loose stools, weight loss or palpitation needs recheck tsh 90 Tablet 3    diclofenac sodium (VOLTAREN PO) Apply  to affected area as needed. albuterol (Ventolin HFA) 90 mcg/actuation inhaler USE 2 INHALATIONS EVERY 4 HOURS AS NEEDED FOR WHEEZING OR SHORTNESS OF BREATH 18 g 1    alendronate (FOSAMAX) 70 mg tablet Take 1 Tablet by mouth every seven (7) days. Give  tablet with 8 ounces of  Water 30 minutes before breakfast.  Do not lie down for at least 30 minutes after administration and until after the first food of the day. please cont vit d supplementation 12 Tablet 3    CALCIUM-VITAMIN D3 PO Take 1 Tablet by mouth two (2) times a day.  330 mg/200 units      cholecalciferol (VITAMIN D3) (5000 Units /125 mcg) capsule Take 5,000 Units by mouth three (3) days a week. aspirin delayed-release 81 mg tablet Take  by mouth daily.  (Patient not taking: Reported on 2/13/2023)       No Known Allergies    Review of Systems - General ROS: negative for - chills or fever  Cardiovascular ROS: no chest pain or dyspnea on exertion  Respiratory ROS: no cough, shortness of breath, or wheezing    Visit Vitals  /80 (BP 1 Location: Left upper arm, BP Patient Position: Sitting, BP Cuff Size: Adult)   Pulse 67   Temp 98.1 °F (36.7 °C) (Oral)   Resp 16   Ht 5' 2\" (1.575 m)   Wt 141 lb 9.6 oz (64.2 kg)   SpO2 98%   BMI 25.90 kg/m²     Constitutional: [x] Appears well-developed and well-nourished [x] No apparent distress      [] Abnormal -     Mental status: [x] Alert and awake  [x] Oriented to person/place/time [x] Able to follow commands    [] Abnormal -     Eyes:   EOM    [x]  Normal    [] Abnormal -   Sclera  [x]  Normal    [] Abnormal -          Discharge [x]  None visible   [] Abnormal -     HENT: [x] Normocephalic, atraumatic  [] Abnormal -   [x] Mouth/Throat: Mucous membranes are moist    External Ears [x] Normal  [] Abnormal -    Neck: [x] No visualized mass [] Abnormal -     Pulmonary/Chest: [x] Respiratory effort normal   [x] No visualized signs of difficulty breathing or respiratory distress        [] Abnormal -      Musculoskeletal:   [x] Normal gait with no signs of ataxia         [x] Normal range of motion of neck        [] Abnormal -     Neurological:        [x] No Facial Asymmetry (Cranial nerve 7 motor function) (limited exam due to video visit)          [x] No gaze palsy        [] Abnormal -          Skin:        [x] No significant exanthematous lesions or discoloration noted on facial skin         [] Abnormal -            Psychiatric:       [x] Normal Affect [] Abnormal -        [x] No Hallucinations                                                                 This medical record was transcribed using an electronic medical records/speech recognition system. Although proofread, it may and can contain electronic, spelling and other errors. Corrections may be executed at a later time. Please contact us for any clarifications as needed. on this date February 13, 2023 I have spent 30  minutes reviewing previous notes, test results and face to face with the patient discussing the diagnosis and importance of compliance with the treatment plan as well as documenting on the day of the visit.

## 2023-02-13 NOTE — PROGRESS NOTES
Please let patient know her x-ray was negative for fracture which is good.   Lets have physical therapy assess to0

## 2023-02-14 NOTE — PROGRESS NOTES
Spoke with Patient and notified of x-ray results are neg. Patient to start physical though BS 02/15/23.

## 2023-02-15 ENCOUNTER — HOSPITAL ENCOUNTER (OUTPATIENT)
Dept: PHYSICAL THERAPY | Age: 78
Discharge: HOME | End: 2023-02-15
Payer: MEDICARE

## 2023-02-15 PROCEDURE — 97016 VASOPNEUMATIC DEVICE THERAPY: CPT | Performed by: PHYSICAL THERAPIST

## 2023-02-15 PROCEDURE — 97110 THERAPEUTIC EXERCISES: CPT | Performed by: PHYSICAL THERAPIST

## 2023-02-15 PROCEDURE — 97161 PT EVAL LOW COMPLEX 20 MIN: CPT | Performed by: PHYSICAL THERAPIST

## 2023-02-15 PROCEDURE — 97535 SELF CARE MNGMENT TRAINING: CPT | Performed by: PHYSICAL THERAPIST

## 2023-02-15 NOTE — THERAPY EVALUATION
Physical Therapy at [facility],   a part of  Maria Luisa Bowers [facility]  [facility]  [facility], [address]  Phone: [phone]  Fax: [phone]    Plan of Care/Statement of Necessity for Physical Therapy Services  2-15    Patient name: Ralph Mcintyre  : 1945  Provider#: [id number]  Referral source: Deborah Tanner *      Medical/Treatment Diagnosis: Right leg pain [M79.604]     Prior Hospitalization: see medical history     Comorbidities: arthritis, OP, mild asthma, rectocele 18, Adenoma removed (03)  Prior Level of Function: Pt walks 3 miles per day  Medications: Verified on Patient Summary List  Start of Care: 2/15/23      Onset Date: 2 weeks prior to start of care   The Plan of Care and following information is based on the information from the initial evaluation. Assessment/ key information: The patient presents with R ankle pain and swelling which began insidiously 2 weeks ago and affects her ability to ambulate. She has decreased L ankle ROM and painful resisted DF. The patient would benefit from outpatient PT to restore mobility in her foot and ankle and decrease swelling in her LLE to improve weight bearing tolerance.     Evaluation Complexity History HIGH Complexity :3+ comorbidities / personal factors will impact the outcome/ POC ; Examination HIGH Complexity : 4+ Standardized tests and measures addressing body structure, function, activity limitation and / or participation in recreation  ;Presentation LOW Complexity : Stable, uncomplicated  ;Clinical Decision Making MEDIUM Complexity : FOTO score of 26-74  Overall Complexity Rating: LOW     Problem List: pain affecting function, decrease ROM, decrease strength, edema affecting function, impaired gait/ balance, decrease ADL/ functional abilitiies, decrease activity tolerance, decrease flexibility/ joint mobility, and decrease transfer abilities   Treatment Plan may include any combination of the following: Therapeutic exercise, Neuromuscular reeducation, Manual therapy, Therapeutic activity, Self care/home management, and Vasopneumatic device  Patient / Family readiness to learn indicated by: asking questions, trying to perform skills, and interest  Persons(s) to be included in education: patient (P)  Barriers to Learning/Limitations: None  Patient Goal (s): Get over the pain and resume walking for exercising  Patient Self Reported Health Status: good  Rehabilitation Potential: excellent    Short Term Goals: To be accomplished in 4 weeks:  1) The patient will be independent with introductory HEP  2) The patient will improve R ankle DF AROM to 5 deg or greater to improve ease with heel strike during ambulation  3) The patient will improve R ankle PF AROM to 55 deg to improve ease with transfers   Long Term Goals: To be accomplished in 12 weeks:  1) The patient will walk 3 miles without an increase in pain to improve ease with running errands in her community  2) The patient will demonstrate pain free ankle DF and PF AROM WFL to improve ease with household chores  3) The patient will demonstrate pain free resisted DF on MMT to improve standing tolerance  Frequency / Duration: Patient to be seen 1 times per week for 12 weeks. Patient/ Caregiver education and instruction: self care, activity modification, and exercises    [x]  Plan of care has been reviewed with PTA        Certification Period: 2/15/23-5/15/23  Urmila Rodriguez, PT 2/15/2023     ________________________________________________________________________    I certify that the above Therapy Services are being furnished while the patient is under my care. I agree with the treatment plan and certify that this therapy is necessary.     Physician's Signature:____________________  Date:____________Time: _________         Albin Hooks *

## 2023-02-15 NOTE — PROGRESS NOTES
PT INITIAL EVALUATION NOTE - [facility] 2-15    Patient Name: Primitivo Chirinos  Date:2/15/2023  : 1945  [x]  Patient  Verified  Payor: [place] MEDICARE / Plan: [place] MEDICARE PART A & B / Product Type: Medicare /    In time: 11:50 AM  Out time: 12:55 PM  Total Treatment Time (min): 65  Total Timed Codes (min): 25  1:1 Treatment Time ( only): 25   Visit #: 1     Treatment Area: Right leg pain [M79.604]    SUBJECTIVE  Pain Level (0-10 scale): 0/10 at rest   At worst: 3-4/10, pain is increased by walking, standing  At best: 0/10, pain is decreased with rest  Any medication changes, allergies to medications, adverse drug reactions, diagnosis change, or new procedure performed?: [] No    [x] Yes (see summary sheet for update)  Subjective:    She started having R calf pain less than 2 weeks ago. No falls, no change in footwear. Pt wears the [place] ghost. Pt used crutches one day last week. Pt is not taking anything for the pain. No trouble sleeping. PLOF: Pt enjoys walking 3 miles a day  Mechanism of Injury: Insidious onset.  \"Possibly related to other joint issues\"  Previous Treatment/Compliance: R hip pain, PT  PMHx/Surgical Hx: arthritis, OP, mild asthma, rectocele 18, Adenoma removed (03)  Work Hx: Pt works 0-40 hours per week depending on the week for [employer] Situation: Pt lives on the first floor  Pt Goals: Get over the pain and resume walking for exercising  Barriers: none  Motivation: good  Substance use: none  Cognition: A & O x 3        OBJECTIVE/EXAMINATION  Posture:  Low arches, B bunion  Gait and Functional Mobility:  Good arm swing, R toe out, decreased L heel strike  Palpation: TTP R anterior tib  Swelling: Significant swelling around R ankle     Lower Extremity AROM:        R  L        Ankle DF   0 P!  10   Ankle PF  50  60    LOWER QUARTER   MUSCLE STRENGTH  KEY       R  L  0 - No Contraction  L1, L2 Psoas  5  5    1 - Trace   L3 Quads  5  5    2 - Poor   L4 Tib Ant  5 p!  5     3 - Fair    L5 EHL  5  5    4 - Good   S1 FHL  5  5    5 - Normal   S2 Hams  5 p!  5          MMT: See above    Special Tests:    SLR 90 deg B   HAdDT - B   PART - B    Modality rationale: decrease edema, decrease inflammation, and decrease pain to improve the patients ability to sit, stand, transfer, ambulate, lift, carry, reach, complete ADLs   Min Type Additional Details    [] Estim: []Att   []Unatt        []TENS instruct                  []IFC  []Premod   []NMES                     []Other:  []w/US   []w/ice   []w/heat  Position:  Location:    []  Traction: [] Cervical       []Lumbar                       [] Prone          []Supine                       []Intermittent   []Continuous Lbs:  [] before manual  [] after manual  []w/heat    []  Ultrasound: []Continuous   [] Pulsed at:                           []1MHz   []3MHz Location:  W/cm2:    [] Paraffin         Location:   []w/heat    []  Ice     []  Heat  []  Ice massage Position:  Location:    []  Laser  []  Other: Position:  Location:     15 [x]  Vasopneumatic Device Pressure:       [] lo [x] med [] hi   Temperature: 34     [x] Skin assessment post-treatment:  [x]intact []redness- no adverse reaction    []redness - adverse reaction:     10 min Therapeutic Exercise:  [x] See flow sheet :   Rationale: increase ROM and increase strength to improve the patients ability to sit, stand, transfer, ambulate, complete ADLs    15 min Self Care/Home Management:  [x]  See flow sheet :gym routine, L heel strike when walking, L stance in standing   Rationale: improve coordination, improve balance, and increase proprioception  to improve the patients ability to sit, stand, transfer, ambulate, lift, carry, reach, complete ADLs         With   [x] TE   [] TA   [] Neuro   [] SC   [] other: Patient Education: [x] Review HEP    [] Progressed/Changed HEP based on:   [x] positioning   [x] body mechanics   [] transfers   [x] heat/ice application    [] other:      Other Objective/Functional Measures: FOTO Functional Measure: 63/100              slight           Pain Level (0-10 scale) post treatment: 0    ASSESSMENT/Changes in Function:     [x]  See Plan of [facility] [address] [place] [address], PT 2/15/2023

## 2023-02-22 ENCOUNTER — HOSPITAL ENCOUNTER (OUTPATIENT)
Dept: PHYSICAL THERAPY | Age: 78
Discharge: HOME | End: 2023-02-22
Payer: MEDICARE

## 2023-02-22 PROCEDURE — 97016 VASOPNEUMATIC DEVICE THERAPY: CPT | Performed by: PHYSICAL MEDICINE & REHABILITATION

## 2023-02-22 PROCEDURE — 97110 THERAPEUTIC EXERCISES: CPT | Performed by: PHYSICAL MEDICINE & REHABILITATION

## 2023-02-22 NOTE — PROGRESS NOTES
PT DAILY TREATMENT NOTE - [facility] 2-15    Patient Name: Teresa Charter  Date:2023  : 1945  [x]  Patient  Verified  Payor: Nani Urbina / Plan: [place] MEDICARE PART A & B / Product Type: Medicare /    In time:1215p  Out time:115p  Total Treatment Time (min): 60  Total Timed Codes (min): 45  1:1 Treatment Time ( [address] only): 39   Visit #: 2      Treatment Area: Right leg pain [M79.604]    SUBJECTIVE  Pain Level (0-10 scale): 0/10  Any medication changes, allergies to medications, adverse drug reactions, diagnosis change, or new procedure performed?: [x] No    [] Yes (see summary sheet for update)  Subjective functional status/changes:   [] No changes reported  Patient reported feeling better since last visit. Patient stated she started resuming her walking but isn't at the distance she was.     OBJECTIVE    Modality rationale: decrease inflammation and decrease pain to improve the patients ability to ADLs, standing and walking tolerance   Min Type Additional Details    [] Estim: []Att   []Unatt        []TENS instruct                  []IFC  []Premod   []NMES                     []Other:  []w/US   []w/ice   []w/heat  Position:  Location:    []  Traction: [] Cervical       []Lumbar                       [] Prone          []Supine                       []Intermittent   []Continuous Lbs:  [] before manual  [] after manual  []w/heat    []  Ultrasound: []Continuous   [] Pulsed at:                           []1MHz   []3MHz Location:  W/cm2:    [] Paraffin         Location:   []w/heat    []  Ice     []  Heat  []  Ice massage Position:  Location:    []  Laser  []  Other: Position:  Location:   15   [x]  Vasopneumatic Device Pressure:       [] lo [x] med [] hi   Temperature:  34     [x] Skin assessment post-treatment:  [x]intact []redness- no adverse reaction    []redness - adverse reaction:     45 min Therapeutic Exercise:  [x] See flow sheet :   Rationale: increase ROM, increase strength, and improve coordination to improve the patients ability to ADls, standing and walking tolerance    With   [x] TE   [] TA   [] neuro   [] other: Patient Education: [x] Review HEP    [] Progressed/Changed HEP based on:   [] positioning   [] body mechanics   [] transfers   [] heat/ice application    [] other:      Other Objective/Functional Measures: Mod difficulty with SLS, 1-2 LOB B    No increase in pain with standing therex     Pain Level (0-10 scale) post treatment: 0/10    ASSESSMENT/Changes in Function:     Patient will continue to benefit from skilled PT services to modify and progress therapeutic interventions, address functional mobility deficits, address ROM deficits, address strength deficits, analyze and address soft tissue restrictions, analyze and cue movement patterns, analyze and modify body mechanics/ergonomics, and assess and modify postural abnormalities to attain remaining goals. []  See Plan of Care  []  See progress note/recertification  []  See Discharge Summary         Progress towards goals / Updated goals:  Patient is progressing well towards goals with good tolerance to standing therex. Will continue to progress as tolerated.     PLAN  [x]  Upgrade activities as tolerated     [x]  Continue plan of care  [x]  Update interventions per flow sheet       []  Discharge due to:_  []  Other:_      Wellington Triplett PTA, OPTA, CPT  2/22/2023

## 2023-03-03 ENCOUNTER — HOSPITAL ENCOUNTER (OUTPATIENT)
Dept: PHYSICAL THERAPY | Age: 78
Discharge: HOME | End: 2023-03-03
Payer: MEDICARE

## 2023-03-03 PROCEDURE — 97016 VASOPNEUMATIC DEVICE THERAPY: CPT | Performed by: PHYSICAL THERAPIST

## 2023-03-03 PROCEDURE — 97110 THERAPEUTIC EXERCISES: CPT | Performed by: PHYSICAL THERAPIST

## 2023-03-03 NOTE — PROGRESS NOTES
PT DAILY TREATMENT NOTE - [facility] 2-15    Patient Name: Lorenzo Edward  Date:3/3/2023  : 1945  [x]  Patient  Verified  Payor: [place] MEDICARE / Plan: [place] MEDICARE PART A & B / Product Type: Medicare /    In time:7: 39 AM Out time: 845 AM  Total Treatment Time (min): 60  Total Timed Codes (min): 45  1:1 Treatment Time ( only): 39   Visit #: 3      Treatment Area: Right leg pain [M65.957]    SUBJECTIVE  Pain Level (0-10 scale): 0/10  Any medication changes, allergies to medications, adverse drug reactions, diagnosis change, or new procedure performed?: [x] No    [] Yes (see summary sheet for update)  Subjective functional status/changes:   [] No changes reported  Patient reports she walked 3 miles yesterday, slowly  She has no pain after walking but she has fatigue/ \"its tired\"    OBJECTIVE    Modality rationale: decrease inflammation and decrease pain to improve the patients ability to ADLs, standing and walking tolerance   Min Type Additional Details    [] Estim: []Att   []Unatt        []TENS instruct                  []IFC  []Premod   []NMES                     []Other:  []w/US   []w/ice   []w/heat  Position:  Location:    []  Traction: [] Cervical       []Lumbar                       [] Prone          []Supine                       []Intermittent   []Continuous Lbs:  [] before manual  [] after manual  []w/heat    []  Ultrasound: []Continuous   [] Pulsed at:                           []1MHz   []3MHz Location:  W/cm2:    [] Paraffin         Location:   []w/heat    []  Ice     []  Heat  []  Ice massage Position:  Location:    []  Laser  []  Other: Position:  Location:   15   [x]  Vasopneumatic Device Pressure:       [] lo [x] med [] hi   Temperature:  34     [x] Skin assessment post-treatment:  [x]intact []redness- no adverse reaction    []redness - adverse reaction:     45 min Therapeutic Exercise:  [x] See flow sheet :   Rationale: increase ROM, increase strength, and improve coordination to improve the patients ability to ADls, standing and walking tolerance    With   [x] TE   [] TA   [] neuro   [] other: Patient Education: [x] Review HEP    [] Progressed/Changed HEP based on:   [x] positioning   [] body mechanics   [] transfers   [x] heat/ice application    [] other:      Other Objective/Functional Measures:   SLS R 30 L 30 (1/2 trials)  Some R knee pain with sit <> stand    Pain Level (0-10 scale) post treatment: 0/10    ASSESSMENT/Changes in Function:     Patient will continue to benefit from skilled PT services to modify and progress therapeutic interventions, address functional mobility deficits, address ROM deficits, address strength deficits, analyze and address soft tissue restrictions, analyze and cue movement patterns, analyze and modify body mechanics/ergonomics, and assess and modify postural abnormalities to attain remaining goals. []  See Plan of Care  []  See progress note/recertification  []  See Discharge Summary         Progress towards goals / Updated goals:  Excellent progress towards goals.      PLAN  [x]  Upgrade activities as tolerated     [x]  Continue plan of care  [x]  Update interventions per flow sheet       []  Discharge due to:_  []  Other:_      Nohemi Grace, PT DPT 3/3/2023

## 2023-03-06 ENCOUNTER — HOSPITAL ENCOUNTER (OUTPATIENT)
Dept: PHYSICAL THERAPY | Age: 78
Discharge: HOME | End: 2023-03-06
Payer: MEDICARE

## 2023-03-06 PROCEDURE — 97016 VASOPNEUMATIC DEVICE THERAPY: CPT | Performed by: PHYSICAL THERAPIST

## 2023-03-06 PROCEDURE — 97110 THERAPEUTIC EXERCISES: CPT | Performed by: PHYSICAL THERAPIST

## 2023-03-06 NOTE — PROGRESS NOTES
PT DAILY TREATMENT NOTE - [facility] 2-15    Patient Name: Yovana Connor  Date:3/6/2023  : 1945  [x]  Patient  Verified  Payor: [place] MEDICARE / Plan: VA MEDICARE PART A & B / Product Type: Medicare /    In time: 11:00 AM Out time: 1200 PM  Total Treatment Time (min): 60  Total Timed Codes (min): 45  1:1 Treatment Time ([facility] only): 39   Visit #: 4      Treatment Area: Right leg pain [M79.079]    SUBJECTIVE  Pain Level (0-10 scale): 0/10  Any medication changes, allergies to medications, adverse drug reactions, diagnosis change, or new procedure performed?: [x] No    [] Yes (see summary sheet for update)  Subjective functional status/changes:   [] No changes reported  Pt reports no issues after last PT session    OBJECTIVE    Modality rationale: decrease inflammation and decrease pain to improve the patients ability to ADLs, standing and walking tolerance   Min Type Additional Details    [] Estim: []Att   []Unatt        []TENS instruct                  []IFC  []Premod   []NMES                     []Other:  []w/US   []w/ice   []w/heat  Position:  Location:    []  Traction: [] Cervical       []Lumbar                       [] Prone          []Supine                       []Intermittent   []Continuous Lbs:  [] before manual  [] after manual  []w/heat    []  Ultrasound: []Continuous   [] Pulsed at:                           []1MHz   []3MHz Location:  W/cm2:    [] Paraffin         Location:   []w/heat    []  Ice     []  Heat  []  Ice massage Position:  Location:    []  Laser  []  Other: Position:  Location:   15   [x]  Vasopneumatic Device Pressure:       [] lo [x] med [] hi   Temperature:  34     [x] Skin assessment post-treatment:  [x]intact []redness- no adverse reaction    []redness - adverse reaction:     45 min Therapeutic Exercise:  [x] See flow sheet :   Rationale: increase ROM, increase strength, and improve coordination to improve the patients ability to ADls, standing and walking tolerance    With   [x] TE   [] TA   [] neuro   [] other: Patient Education: [x] Review HEP    [] Progressed/Changed HEP based on:   [x] positioning   [] body mechanics   [] transfers   [x] heat/ice application    [] other:      Other Objective/Functional Measures:   SLS R 30 L 20  Some R knee pain with sit <> stand, with and without RTB, none with     Some R knee pain with lateral step ups on R side    Pain Level (0-10 scale) post treatment: 0/10    ASSESSMENT/Changes in Function:     Patient will continue to benefit from skilled PT services to modify and progress therapeutic interventions, address functional mobility deficits, address ROM deficits, address strength deficits, analyze and address soft tissue restrictions, analyze and cue movement patterns, analyze and modify body mechanics/ergonomics, and assess and modify postural abnormalities to attain remaining goals. []  See Plan of Care  []  See progress note/recertification  []  See Discharge Summary         Progress towards goals / Updated goals: Good tolerance of today's interventions. WIll re-evaluate next week. Short Term Goals: To be accomplished in 4 weeks:  1) The patient will be independent with introductory HEP  2) The patient will improve R ankle DF AROM to 5 deg or greater to improve ease with heel strike during ambulation  3) The patient will improve R ankle PF AROM to 55 deg to improve ease with transfers   Long Term Goals:  To be accomplished in 12 weeks:  1) The patient will walk 3 miles without an increase in pain to improve ease with running errands in her community  2) The patient will demonstrate pain free ankle DF and PF AROM WFL to improve ease with household chores  3) The patient will demonstrate pain free resisted DF on MMT to improve standing tolerance    PLAN  [x]  Upgrade activities as tolerated     [x]  Continue plan of care  [x]  Update interventions per flow sheet       []  Discharge due to:_  []  Other:_      Kristopher Alcantara, PT DPT 3/6/2023

## 2023-03-13 ENCOUNTER — HOSPITAL ENCOUNTER (OUTPATIENT)
Dept: PHYSICAL THERAPY | Age: 78
Discharge: HOME | End: 2023-03-13
Payer: MEDICARE

## 2023-03-13 PROCEDURE — 97110 THERAPEUTIC EXERCISES: CPT | Performed by: PHYSICAL THERAPIST

## 2023-03-13 NOTE — PROGRESS NOTES
PT DAILY TREATMENT NOTE - [facility] 2-15    Patient Name: Linette Lopez  Date:3/13/2023  : 1945  [x]  Patient  Verified  Payor: VA MEDICARE / Plan: VA MEDICARE PART A & B / Product Type: Medicare /    In time: 11:00 AM Out time: 1140 AM  Total Treatment Time (min): 40  Total Timed Codes (min): 40  1:1 Treatment Time ([facility] only): 40  Visit #: 5      Treatment Area: Right leg pain [M20.174]    SUBJECTIVE  Pain Level (0-10 scale): 0/10  Any medication changes, allergies to medications, adverse drug reactions, diagnosis change, or new procedure performed?: [x] No    [] Yes (see summary sheet for update)  Subjective functional status/changes:   [] No changes reported  Pt reports she is feeling good and has not had an issue with her HEP  Pt reports she can walk 3 miles without an increase in ankle pain    OBJECTIVE    Modality rationale: decrease inflammation and decrease pain to improve the patients ability to ADLs, standing and walking tolerance   Min Type Additional Details    [] Estim: []Att   []Unatt        []TENS instruct                  []IFC  []Premod   []NMES                     []Other:  []w/US   []w/ice   []w/heat  Position:  Location:    []  Traction: [] Cervical       []Lumbar                       [] Prone          []Supine                       []Intermittent   []Continuous Lbs:  [] before manual  [] after manual  []w/heat    []  Ultrasound: []Continuous   [] Pulsed at:                           []1MHz   []3MHz Location:  W/cm2:    [] Paraffin         Location:   []w/heat    []  Ice     []  Heat  []  Ice massage Position:  Location:    []  Laser  []  Other: Position:  Location:   declined   [x]  Vasopneumatic Device Pressure:       [] lo [x] med [] hi   Temperature:  34     [x] Skin assessment post-treatment:  [x]intact []redness- no adverse reaction    []redness - adverse reaction:     40 min Therapeutic Exercise:  [x] See flow sheet :   Rationale: increase ROM, increase strength, and improve coordination to improve the patients ability to ADls, standing and walking tolerance    With   [x] TE   [] TA   [] neuro   [] other: Patient Education: [x] Review HEP    [] Progressed/Changed HEP based on:   [x] positioning   [] body mechanics   [] transfers   [] heat/ice application    [] other:      Other Objective/Functional Measures: Ankle DF 10 deg B  ANkle PF R 50 L 60 (R 55 after intervention)  Ankle DF MMT 5/5 B pain free  Pain Level (0-10 scale) post treatment: 0/10    ASSESSMENT/Changes in Function:      []  See Plan of Care  []  See progress note/recertification  [x]  See Discharge Summary         Progress towards goals / Updated goals:   Short Term Goals: To be accomplished in 4 weeks:  1) The patient will be independent with introductory HEP MET  2) The patient will improve R ankle DF AROM to 5 deg or greater to improve ease with heel strike during ambulation MET  3) The patient will improve R ankle PF AROM to 55 deg to improve ease with transfers  MET  Long Term Goals:  To be accomplished in 12 weeks:  1) The patient will walk 3 miles without an increase in pain to improve ease with running errands in her community MET  2) The patient will demonstrate pain free ankle DF and PF AROM WFL to improve ease with household chores - MET  3) The patient will demonstrate pain free resisted DF on MMT to improve standing tolerance - MET    PLAN  []  Upgrade activities as tolerated     []  Continue plan of care  []  Update interventions per flow sheet       [x]  Discharge due to:_ pt has maximized therapeutic benefit  []  Other:_      Trenton Ayala PT DPT 3/13/2023

## 2023-03-13 NOTE — THERAPY DISCHARGE
Physical Therapy at [facility],   a part of  [facility]  [address] [facility] Carley Javed  Phone: [phone] Fax: [phone]      Discharge Summary 2-15    Patient name: He Garcia  : 1945  Provider#: [id number]  Referral source: Becky Reina      Medical/Treatment Diagnosis: Right leg pain [K89.912]     Prior Hospitalization: see medical history     Comorbidities: See Plan of Care  Prior Level of Function: See Plan of Care  Medications: Verified on Patient Summary List    Start of Care: 2/15/23      Onset Date:2 weeks prior to start of care   Visits from Start of Care: 5     Missed Visits: 0  Reporting Period : 2/15/23 to 3/13/23    Assessment/Summary of care: The patient has completed 5 physical therapy visits and has met all short and long term goals. The patient scored a 91% on the FOTO outcomes survey, a significant improvement from initial evaluation score of 63%. The patient has maximized therapeutic benefit at this time and is ready for discharge to independent [facility]. Short Term Goals: To be accomplished in 4 weeks:  1) The patient will be independent with introductory HEP MET  2) The patient will improve R ankle DF AROM to 5 deg or greater to improve ease with heel strike during ambulation MET  3) The patient will improve R ankle PF AROM to 55 deg to improve ease with transfers  MET  Long Term Goals:  To be accomplished in 12 weeks:  1) The patient will walk 3 miles without an increase in pain to improve ease with running errands in her community MET  2) The patient will demonstrate pain free ankle DF and PF AROM WFL to improve ease with household chores - MET  3) The patient will demonstrate pain free resisted DF on MMT to improve standing tolerance - MET      RECOMMENDATIONS:  [x]Discontinue therapy: [x]Patient has reached or is progressing toward set goals     []Patient is non-compliant or has abdicated     []Due to lack of appreciable progress towards set goals     []Other  Mandeep Fajardo, PT 3/13/2023

## 2023-04-20 DIAGNOSIS — M81.0 AGE-RELATED OSTEOPOROSIS WITHOUT CURRENT PATHOLOGICAL FRACTURE: ICD-10-CM

## 2023-04-20 RX ORDER — ALENDRONATE SODIUM 70 MG/1
TABLET ORAL
Qty: 12 TABLET | Refills: 3 | Status: SHIPPED | OUTPATIENT
Start: 2023-04-20

## 2023-04-21 DIAGNOSIS — Z12.31 OTHER SCREENING MAMMOGRAM: Primary | ICD-10-CM

## 2023-04-22 ENCOUNTER — TRANSCRIBE ORDERS (OUTPATIENT)
Facility: HOSPITAL | Age: 78
End: 2023-04-22

## 2023-04-22 DIAGNOSIS — M85.80 OSTEOPENIA AFTER MENOPAUSE: Primary | ICD-10-CM

## 2023-04-22 DIAGNOSIS — Z78.0 OSTEOPENIA AFTER MENOPAUSE: Primary | ICD-10-CM

## 2023-04-22 DIAGNOSIS — Z12.31 VISIT FOR SCREENING MAMMOGRAM: Primary | ICD-10-CM

## 2023-04-22 DIAGNOSIS — Z12.31 VISIT FOR SCREENING MAMMOGRAM: ICD-10-CM

## 2023-06-02 ENCOUNTER — HOSPITAL ENCOUNTER (OUTPATIENT)
Facility: HOSPITAL | Age: 78
End: 2023-06-02
Payer: MEDICARE

## 2023-06-02 DIAGNOSIS — M85.80 OSTEOPENIA AFTER MENOPAUSE: ICD-10-CM

## 2023-06-02 DIAGNOSIS — Z78.0 OSTEOPENIA AFTER MENOPAUSE: ICD-10-CM

## 2023-06-02 PROCEDURE — 77080 DXA BONE DENSITY AXIAL: CPT

## 2023-06-08 ENCOUNTER — OFFICE VISIT (OUTPATIENT)
Age: 78
End: 2023-06-08
Payer: MEDICARE

## 2023-06-08 VITALS
RESPIRATION RATE: 16 BRPM | BODY MASS INDEX: 25.36 KG/M2 | OXYGEN SATURATION: 98 % | WEIGHT: 137.8 LBS | HEIGHT: 62 IN | SYSTOLIC BLOOD PRESSURE: 118 MMHG | HEART RATE: 63 BPM | TEMPERATURE: 98.4 F | DIASTOLIC BLOOD PRESSURE: 68 MMHG

## 2023-06-08 DIAGNOSIS — M81.0 AGE-RELATED OSTEOPOROSIS WITHOUT CURRENT PATHOLOGICAL FRACTURE: Primary | ICD-10-CM

## 2023-06-08 DIAGNOSIS — E07.9 DISORDER OF THYROID, UNSPECIFIED: ICD-10-CM

## 2023-06-08 DIAGNOSIS — M81.0 AGE-RELATED OSTEOPOROSIS WITHOUT CURRENT PATHOLOGICAL FRACTURE: ICD-10-CM

## 2023-06-08 PROCEDURE — 3017F COLORECTAL CA SCREEN DOC REV: CPT | Performed by: INTERNAL MEDICINE

## 2023-06-08 PROCEDURE — G8419 CALC BMI OUT NRM PARAM NOF/U: HCPCS | Performed by: INTERNAL MEDICINE

## 2023-06-08 PROCEDURE — 99213 OFFICE O/P EST LOW 20 MIN: CPT | Performed by: INTERNAL MEDICINE

## 2023-06-08 PROCEDURE — 1123F ACP DISCUSS/DSCN MKR DOCD: CPT | Performed by: INTERNAL MEDICINE

## 2023-06-08 PROCEDURE — 1090F PRES/ABSN URINE INCON ASSESS: CPT | Performed by: INTERNAL MEDICINE

## 2023-06-08 PROCEDURE — G8428 CUR MEDS NOT DOCUMENT: HCPCS | Performed by: INTERNAL MEDICINE

## 2023-06-08 PROCEDURE — 1036F TOBACCO NON-USER: CPT | Performed by: INTERNAL MEDICINE

## 2023-06-08 PROCEDURE — G8399 PT W/DXA RESULTS DOCUMENT: HCPCS | Performed by: INTERNAL MEDICINE

## 2023-06-08 ASSESSMENT — PATIENT HEALTH QUESTIONNAIRE - PHQ9
SUM OF ALL RESPONSES TO PHQ QUESTIONS 1-9: 0
2. FEELING DOWN, DEPRESSED OR HOPELESS: 0
SUM OF ALL RESPONSES TO PHQ9 QUESTIONS 1 & 2: 0
SUM OF ALL RESPONSES TO PHQ QUESTIONS 1-9: 0
SUM OF ALL RESPONSES TO PHQ QUESTIONS 1-9: 0
1. LITTLE INTEREST OR PLEASURE IN DOING THINGS: 0
SUM OF ALL RESPONSES TO PHQ QUESTIONS 1-9: 0

## 2023-06-08 NOTE — PROGRESS NOTES
Abdominal: Soft. Normal aorta and bowel sounds are normal. She exhibits no distension and no mass. There is no hepatosplenomegaly. No tenderness. Musculoskeletal: She exhibits no edema and no tenderness. Neurological: She is alert and oriented to person, place, and time. She has normal strength. No cranial nerve deficit or sensory deficit. Coordination and gait normal.   Skin: Skin is warm and dry. No rash noted. No erythema. Psychiatric: She has a normal mood and affect. Her behavior is normal.          Assessment:       78 y.o. patient with planned surgery as above. Known risk factors for perioperative complications: None  Current medications which may produce withdrawal symptoms if withheld perioperatively: none      Plan:     1. Preoperative workup as follows: none    Patient is low risk for a low risk surgery. She is not on any medications which need to be tested. She can proceed with surgery.

## 2023-06-09 LAB
ALBUMIN SERPL-MCNC: 4.2 G/DL (ref 3.5–5)
ALBUMIN/GLOB SERPL: 1.6 (ref 1.1–2.2)
ALP SERPL-CCNC: 78 U/L (ref 45–117)
ALT SERPL-CCNC: 27 U/L (ref 12–78)
ANION GAP SERPL CALC-SCNC: 4 MMOL/L (ref 5–15)
AST SERPL-CCNC: 21 U/L (ref 15–37)
BILIRUB SERPL-MCNC: 0.9 MG/DL (ref 0.2–1)
BUN SERPL-MCNC: 13 MG/DL (ref 6–20)
BUN/CREAT SERPL: 21 (ref 12–20)
CALCIUM SERPL-MCNC: 9.5 MG/DL (ref 8.5–10.1)
CHLORIDE SERPL-SCNC: 104 MMOL/L (ref 97–108)
CO2 SERPL-SCNC: 31 MMOL/L (ref 21–32)
CREAT SERPL-MCNC: 0.63 MG/DL (ref 0.55–1.02)
GLOBULIN SER CALC-MCNC: 2.6 G/DL (ref 2–4)
GLUCOSE SERPL-MCNC: 106 MG/DL (ref 65–100)
POTASSIUM SERPL-SCNC: 4.6 MMOL/L (ref 3.5–5.1)
PROT SERPL-MCNC: 6.8 G/DL (ref 6.4–8.2)
SODIUM SERPL-SCNC: 139 MMOL/L (ref 136–145)
TSH SERPL DL<=0.05 MIU/L-ACNC: 0.71 UIU/ML (ref 0.36–3.74)

## 2023-06-10 NOTE — RESULT ENCOUNTER NOTE
Hayley Castillo can you please fax patient's preop note from [facility] to Dr. Carmen Drummond?   Thank you

## 2023-07-31 ENCOUNTER — HOSPITAL ENCOUNTER (OUTPATIENT)
Facility: HOSPITAL | Age: 78
Discharge: HOME | End: 2023-08-03
Payer: MEDICARE

## 2023-07-31 DIAGNOSIS — Z12.31 VISIT FOR SCREENING MAMMOGRAM: ICD-10-CM

## 2023-07-31 PROCEDURE — 77063 BREAST TOMOSYNTHESIS BI: CPT

## 2023-11-30 ENCOUNTER — OFFICE VISIT (OUTPATIENT)
Age: 78
End: 2023-11-30
Payer: MEDICARE

## 2023-11-30 VITALS
TEMPERATURE: 97.6 F | HEIGHT: 62 IN | OXYGEN SATURATION: 98 % | WEIGHT: 137 LBS | HEART RATE: 62 BPM | RESPIRATION RATE: 14 BRPM | BODY MASS INDEX: 25.21 KG/M2 | SYSTOLIC BLOOD PRESSURE: 135 MMHG | DIASTOLIC BLOOD PRESSURE: 82 MMHG

## 2023-11-30 DIAGNOSIS — E07.9 DISORDER OF THYROID, UNSPECIFIED: ICD-10-CM

## 2023-11-30 DIAGNOSIS — J45.20 MILD INTERMITTENT ASTHMA WITHOUT COMPLICATION: ICD-10-CM

## 2023-11-30 DIAGNOSIS — Z00.00 MEDICARE ANNUAL WELLNESS VISIT, SUBSEQUENT: Primary | ICD-10-CM

## 2023-11-30 DIAGNOSIS — M81.0 AGE-RELATED OSTEOPOROSIS WITHOUT CURRENT PATHOLOGICAL FRACTURE: ICD-10-CM

## 2023-11-30 LAB
25(OH)D3 SERPL-MCNC: 30.6 NG/ML (ref 30–100)
ALBUMIN SERPL-MCNC: 4.3 G/DL (ref 3.5–5)
ALBUMIN/GLOB SERPL: 1.7 (ref 1.1–2.2)
ALP SERPL-CCNC: 70 U/L (ref 45–117)
ALT SERPL-CCNC: 21 U/L (ref 12–78)
ANION GAP SERPL CALC-SCNC: 3 MMOL/L (ref 5–15)
AST SERPL-CCNC: 20 U/L (ref 15–37)
BILIRUB SERPL-MCNC: 0.9 MG/DL (ref 0.2–1)
BUN SERPL-MCNC: 14 MG/DL (ref 6–20)
BUN/CREAT SERPL: 22 (ref 12–20)
CALCIUM SERPL-MCNC: 9.2 MG/DL (ref 8.5–10.1)
CHLORIDE SERPL-SCNC: 106 MMOL/L (ref 97–108)
CHOLEST SERPL-MCNC: 176 MG/DL
CO2 SERPL-SCNC: 29 MMOL/L (ref 21–32)
CREAT SERPL-MCNC: 0.65 MG/DL (ref 0.55–1.02)
EST. AVERAGE GLUCOSE BLD GHB EST-MCNC: 111 MG/DL
GLOBULIN SER CALC-MCNC: 2.5 G/DL (ref 2–4)
GLUCOSE SERPL-MCNC: 95 MG/DL (ref 65–100)
HBA1C MFR BLD: 5.5 % (ref 4–5.6)
HDLC SERPL-MCNC: 75 MG/DL
HDLC SERPL: 2.3 (ref 0–5)
LDLC SERPL CALC-MCNC: 86.8 MG/DL (ref 0–100)
POTASSIUM SERPL-SCNC: 4 MMOL/L (ref 3.5–5.1)
PROT SERPL-MCNC: 6.8 G/DL (ref 6.4–8.2)
SODIUM SERPL-SCNC: 138 MMOL/L (ref 136–145)
TRIGL SERPL-MCNC: 71 MG/DL
VLDLC SERPL CALC-MCNC: 14.2 MG/DL

## 2023-11-30 PROCEDURE — 99213 OFFICE O/P EST LOW 20 MIN: CPT | Performed by: INTERNAL MEDICINE

## 2023-11-30 PROCEDURE — G8484 FLU IMMUNIZE NO ADMIN: HCPCS | Performed by: INTERNAL MEDICINE

## 2023-11-30 PROCEDURE — 1123F ACP DISCUSS/DSCN MKR DOCD: CPT | Performed by: INTERNAL MEDICINE

## 2023-11-30 PROCEDURE — 1090F PRES/ABSN URINE INCON ASSESS: CPT | Performed by: INTERNAL MEDICINE

## 2023-11-30 PROCEDURE — G8428 CUR MEDS NOT DOCUMENT: HCPCS | Performed by: INTERNAL MEDICINE

## 2023-11-30 PROCEDURE — G8419 CALC BMI OUT NRM PARAM NOF/U: HCPCS | Performed by: INTERNAL MEDICINE

## 2023-11-30 PROCEDURE — 1036F TOBACCO NON-USER: CPT | Performed by: INTERNAL MEDICINE

## 2023-11-30 PROCEDURE — G8399 PT W/DXA RESULTS DOCUMENT: HCPCS | Performed by: INTERNAL MEDICINE

## 2023-11-30 PROCEDURE — G0439 PPPS, SUBSEQ VISIT: HCPCS | Performed by: INTERNAL MEDICINE

## 2023-11-30 RX ORDER — ALBUTEROL SULFATE 90 UG/1
AEROSOL, METERED RESPIRATORY (INHALATION)
Qty: 18 G | Refills: 1 | Status: SHIPPED | OUTPATIENT
Start: 2023-11-30

## 2023-11-30 SDOH — ECONOMIC STABILITY: FOOD INSECURITY: WITHIN THE PAST 12 MONTHS, THE FOOD YOU BOUGHT JUST DIDN'T LAST AND YOU DIDN'T HAVE MONEY TO GET MORE.: NEVER TRUE

## 2023-11-30 SDOH — ECONOMIC STABILITY: FOOD INSECURITY: WITHIN THE PAST 12 MONTHS, YOU WORRIED THAT YOUR FOOD WOULD RUN OUT BEFORE YOU GOT MONEY TO BUY MORE.: NEVER TRUE

## 2023-11-30 SDOH — ECONOMIC STABILITY: HOUSING INSECURITY
IN THE LAST 12 MONTHS, WAS THERE A TIME WHEN YOU DID NOT HAVE A STEADY PLACE TO SLEEP OR SLEPT IN A SHELTER (INCLUDING NOW)?: NO

## 2023-11-30 SDOH — ECONOMIC STABILITY: INCOME INSECURITY: HOW HARD IS IT FOR YOU TO PAY FOR THE VERY BASICS LIKE FOOD, HOUSING, MEDICAL CARE, AND HEATING?: NOT HARD AT ALL

## 2023-11-30 ASSESSMENT — LIFESTYLE VARIABLES
HOW OFTEN DO YOU HAVE A DRINK CONTAINING ALCOHOL: NEVER
HOW MANY STANDARD DRINKS CONTAINING ALCOHOL DO YOU HAVE ON A TYPICAL DAY: PATIENT DOES NOT DRINK

## 2023-11-30 ASSESSMENT — PATIENT HEALTH QUESTIONNAIRE - PHQ9
SUM OF ALL RESPONSES TO PHQ9 QUESTIONS 1 & 2: 0
1. LITTLE INTEREST OR PLEASURE IN DOING THINGS: 0
2. FEELING DOWN, DEPRESSED OR HOPELESS: 0
SUM OF ALL RESPONSES TO PHQ QUESTIONS 1-9: 0

## 2023-12-20 PROBLEM — E03.8 HYPOTHYROIDISM DUE TO HASHIMOTO'S THYROIDITIS: Status: ACTIVE | Noted: 2023-12-20

## 2023-12-20 PROBLEM — M81.0 AGE-RELATED OSTEOPOROSIS WITHOUT CURRENT PATHOLOGICAL FRACTURE: Status: ACTIVE | Noted: 2023-12-20

## 2023-12-20 PROBLEM — E06.3 HYPOTHYROIDISM DUE TO HASHIMOTO'S THYROIDITIS: Status: ACTIVE | Noted: 2023-12-20

## 2024-01-19 DIAGNOSIS — E06.3 HYPOTHYROIDISM DUE TO HASHIMOTO'S THYROIDITIS: ICD-10-CM

## 2024-01-19 DIAGNOSIS — E03.8 HYPOTHYROIDISM DUE TO HASHIMOTO'S THYROIDITIS: ICD-10-CM

## 2024-01-19 LAB
T4 FREE SERPL-MCNC: 1.3 NG/DL (ref 0.8–1.5)
TSH SERPL DL<=0.05 MIU/L-ACNC: 1.57 UIU/ML (ref 0.36–3.74)

## 2024-01-22 DIAGNOSIS — E03.8 HYPOTHYROIDISM DUE TO HASHIMOTO'S THYROIDITIS: Primary | ICD-10-CM

## 2024-01-22 DIAGNOSIS — E06.3 HYPOTHYROIDISM DUE TO HASHIMOTO'S THYROIDITIS: Primary | ICD-10-CM

## 2024-03-21 RX ORDER — ALENDRONATE SODIUM 70 MG/1
TABLET ORAL
Qty: 12 TABLET | Refills: 3 | Status: SHIPPED | OUTPATIENT
Start: 2024-03-21

## 2024-06-16 DIAGNOSIS — E06.3 HYPOTHYROIDISM DUE TO HASHIMOTO'S THYROIDITIS: Primary | ICD-10-CM

## 2024-06-16 DIAGNOSIS — E03.8 HYPOTHYROIDISM DUE TO HASHIMOTO'S THYROIDITIS: Primary | ICD-10-CM

## 2024-06-18 RX ORDER — LEVOTHYROXINE SODIUM 88 UG/1
88 TABLET ORAL DAILY
Qty: 90 TABLET | Refills: 3 | Status: SHIPPED | OUTPATIENT
Start: 2024-06-18

## 2024-07-22 DIAGNOSIS — E06.3 HYPOTHYROIDISM DUE TO HASHIMOTO'S THYROIDITIS: ICD-10-CM

## 2024-07-22 DIAGNOSIS — E03.8 HYPOTHYROIDISM DUE TO HASHIMOTO'S THYROIDITIS: ICD-10-CM

## 2024-07-22 LAB
T4 FREE SERPL-MCNC: 1.3 NG/DL (ref 0.8–1.5)
TSH SERPL DL<=0.05 MIU/L-ACNC: 3.89 UIU/ML (ref 0.36–3.74)

## 2024-07-31 ENCOUNTER — TRANSCRIBE ORDERS (OUTPATIENT)
Facility: HOSPITAL | Age: 79
End: 2024-07-31

## 2024-07-31 ENCOUNTER — HOSPITAL ENCOUNTER (OUTPATIENT)
Facility: HOSPITAL | Age: 79
Discharge: HOME | End: 2024-08-03
Payer: MEDICARE

## 2024-07-31 VITALS — HEIGHT: 62 IN | WEIGHT: 139 LBS | BODY MASS INDEX: 25.58 KG/M2

## 2024-07-31 DIAGNOSIS — Z12.31 OTHER SCREENING MAMMOGRAM: Primary | ICD-10-CM

## 2024-07-31 DIAGNOSIS — Z12.31 OTHER SCREENING MAMMOGRAM: ICD-10-CM

## 2024-07-31 PROCEDURE — 77063 BREAST TOMOSYNTHESIS BI: CPT

## 2024-09-20 ENCOUNTER — OFFICE VISIT (OUTPATIENT)
Age: 79
End: 2024-09-20

## 2024-09-20 VITALS
DIASTOLIC BLOOD PRESSURE: 81 MMHG | WEIGHT: 140.4 LBS | HEIGHT: 62 IN | SYSTOLIC BLOOD PRESSURE: 129 MMHG | HEART RATE: 79 BPM | OXYGEN SATURATION: 95 % | BODY MASS INDEX: 25.83 KG/M2 | TEMPERATURE: 99.1 F | RESPIRATION RATE: 16 BRPM

## 2024-09-20 DIAGNOSIS — M62.830 BACK SPASM: ICD-10-CM

## 2024-09-20 DIAGNOSIS — R50.9 FEVER, UNSPECIFIED FEVER CAUSE: Primary | ICD-10-CM

## 2024-09-20 LAB
INFLUENZA A ANTIGEN, POC: NEGATIVE
INFLUENZA B ANTIGEN, POC: NEGATIVE
Lab: NORMAL
PERFORMING INSTRUMENT: NORMAL
QC PASS/FAIL: NORMAL
SARS-COV-2, POC: NORMAL

## 2024-09-20 ASSESSMENT — ENCOUNTER SYMPTOMS
BACK PAIN: 1
RESPIRATORY NEGATIVE: 1

## 2024-10-24 ENCOUNTER — OFFICE VISIT (OUTPATIENT)
Age: 79
End: 2024-10-24
Payer: MEDICARE

## 2024-10-24 VITALS
BODY MASS INDEX: 25.73 KG/M2 | DIASTOLIC BLOOD PRESSURE: 83 MMHG | TEMPERATURE: 97.4 F | HEIGHT: 62 IN | OXYGEN SATURATION: 97 % | SYSTOLIC BLOOD PRESSURE: 136 MMHG | HEART RATE: 62 BPM | RESPIRATION RATE: 18 BRPM | WEIGHT: 139.8 LBS

## 2024-10-24 DIAGNOSIS — I49.9 CARDIAC ARRHYTHMIA, UNSPECIFIED CARDIAC ARRHYTHMIA TYPE: Primary | ICD-10-CM

## 2024-10-24 DIAGNOSIS — E06.3 HYPOTHYROIDISM DUE TO HASHIMOTO'S THYROIDITIS: ICD-10-CM

## 2024-10-24 DIAGNOSIS — M81.0 AGE-RELATED OSTEOPOROSIS WITHOUT CURRENT PATHOLOGICAL FRACTURE: ICD-10-CM

## 2024-10-24 PROCEDURE — 93005 ELECTROCARDIOGRAM TRACING: CPT | Performed by: INTERNAL MEDICINE

## 2024-10-24 PROCEDURE — 99214 OFFICE O/P EST MOD 30 MIN: CPT | Performed by: INTERNAL MEDICINE

## 2024-10-24 ASSESSMENT — PATIENT HEALTH QUESTIONNAIRE - PHQ9
SUM OF ALL RESPONSES TO PHQ QUESTIONS 1-9: 0
SUM OF ALL RESPONSES TO PHQ QUESTIONS 1-9: 0
2. FEELING DOWN, DEPRESSED OR HOPELESS: NOT AT ALL
SUM OF ALL RESPONSES TO PHQ9 QUESTIONS 1 & 2: 0
SUM OF ALL RESPONSES TO PHQ QUESTIONS 1-9: 0
1. LITTLE INTEREST OR PLEASURE IN DOING THINGS: NOT AT ALL
SUM OF ALL RESPONSES TO PHQ QUESTIONS 1-9: 0

## 2024-10-24 NOTE — PROGRESS NOTES
ASSESSMENT & PLAN:  1.  Arrhythmia  Symptomatic finding caught by her Apple Watch reading of A-fib  Normal EKG today  No acute symptoms  Evaluate for paroxysmal A-fib  EKG today within normal limits-she will see Dr. Urban tomorrow  Normal TSH in July    -     EKG 12 Lead  2. Age-related osteoporosis without current pathological fracture  On Fosamax  Note reviewed from Dr. Aldridge    3.  Hypothyroid  Patient had thyroid decreased earlier in the year. Her last thyroid check was in July and within normal limits      Chief Complaint   Patient presents with    Atrial Fibrillation     F/up     Arrhythmia  Patient reports that she got out of bed October 21 and felt particularly off.  She did not have any lightheaded or dizziness.  She did not have shortness of breath or chest pain.  Also denied fluttering.  She just frankly felt something was not right.  She checked her Apple Watch and it showed that she might be in fib.  She rested and got further readings.  Notes that she occasionally has these feelings not frequently.  She certainly does not get weekly.  She drinks more than a cup of coffee daily.  Not unusual she is not taking any over-the-counter medications.    Osteoporosis  Working with Dr. Ruiz.  She is not having any side effects on Fosamax.  Notes she did have a fracture of her left wrist when she was younger    SUBJECTIVE: Xenia Haywood is a 79 y.o. female here for follow up of hypothyroidism.    Lab Results   Component Value Date/Time    TSH 3.89 07/22/2024 09:47 AM     Thyroid ROS: denies fatigue, weight changes, heat/cold intolerance, bowel/skin changes or CVS symptoms.           Past Medical History:   Diagnosis Date    Allergic rhinitis     Asthma     Autoimmune disease (HCC)     Hashimotos thyroiditis    Cataract     dr. Hussein Duran    Hypothyroidism     Ill-defined condition     slow heart rate - asymptomatic    Irritable bowel syndrome     Joint pain     feet and hips     Osteoarthritis

## 2024-10-24 NOTE — PROGRESS NOTES
\"Have you been to the ER, urgent care clinic since your last visit?  Hospitalized since your last visit?\"    YES - When: approximately 1 months ago.  Where and Why: [facility] - symptoms after having COVID and Flu shot, fever.    “Have you seen or consulted any other health care providers outside our system since your last visit?”    YES - When: approximately 2 months ago.  Where and Why: [facility].

## 2024-10-25 ENCOUNTER — TELEPHONE (OUTPATIENT)
Age: 79
End: 2024-10-25

## 2024-10-25 ENCOUNTER — OFFICE VISIT (OUTPATIENT)
Age: 79
End: 2024-10-25

## 2024-10-25 VITALS
DIASTOLIC BLOOD PRESSURE: 80 MMHG | HEIGHT: 62 IN | BODY MASS INDEX: 25.95 KG/M2 | HEART RATE: 70 BPM | OXYGEN SATURATION: 98 % | SYSTOLIC BLOOD PRESSURE: 136 MMHG | WEIGHT: 141 LBS

## 2024-10-25 DIAGNOSIS — E03.9 HYPOTHYROIDISM, UNSPECIFIED TYPE: ICD-10-CM

## 2024-10-25 DIAGNOSIS — R94.31 ABNORMAL EKG: Primary | ICD-10-CM

## 2024-10-25 DIAGNOSIS — I49.9 CARDIAC ARRHYTHMIA, UNSPECIFIED CARDIAC ARRHYTHMIA TYPE: ICD-10-CM

## 2024-10-25 DIAGNOSIS — I48.0 PAROXYSMAL ATRIAL FIBRILLATION (HCC): ICD-10-CM

## 2024-10-25 DIAGNOSIS — D49.9 PRECANCEROUS LESION: ICD-10-CM

## 2024-10-25 ASSESSMENT — PATIENT HEALTH QUESTIONNAIRE - PHQ9
SUM OF ALL RESPONSES TO PHQ QUESTIONS 1-9: 0
1. LITTLE INTEREST OR PLEASURE IN DOING THINGS: NOT AT ALL
SUM OF ALL RESPONSES TO PHQ QUESTIONS 1-9: 0
2. FEELING DOWN, DEPRESSED OR HOPELESS: NOT AT ALL
SUM OF ALL RESPONSES TO PHQ9 QUESTIONS 1 & 2: 0
SUM OF ALL RESPONSES TO PHQ QUESTIONS 1-9: 0
SUM OF ALL RESPONSES TO PHQ QUESTIONS 1-9: 0

## 2024-10-25 NOTE — PATIENT INSTRUCTIONS
Dear Chantelle,    Thank you for visiting my office today and for your proactive approach to your health. Your commitment to understanding and improving your cardiovascular well-being is highly valued.    Following our discussion, here are the key instructions and recommendations for your care plan:    - Tests and Monitoring:    - A 14-Day Heart Monitor is scheduled to assess if you are experiencing intermittent atrial fibrillation (AFib) and to determine the duration of these episodes.    - An Echocardiogram is scheduled to evaluate the structure and function of your heart.    - Lifestyle and Activity:    - Continue your active lifestyle but ensure to stay hydrated.    - Monitor your stress and anxiety levels as they can impact your heart rhythm.    - Symptom Monitoring:    - Perform an EKG using your Apple Watch /Tagged mobile if you experience symptoms like heart fluttering or dizziness.    - Medications:    - No Medication for AFib is currently prescribed due to your low heart rate and pending further evaluation.    - Follow-Up Care:    - Please ensure to follow up with the results from the 14-day monitor and the echocardiogram. We will discuss the findings and any further treatment options.    Please keep a close watch on your symptoms and do not hesitate to contact the office if you have any concerns during the monitoring period.    Best regards,    Dr. Yovani Urban MD  Cardiology

## 2024-10-25 NOTE — TELEPHONE ENCOUNTER
Enrolled with [employer] - Ordered and being shipped to patient's home address on file.  ETA within 5-7 Business Days.        14 day  Received: Today  Emily Mendoza, Lore Fox, can you please order this patient a 14 day for afib? Thank you! :)

## 2024-10-25 NOTE — PROGRESS NOTES
Cardiology  Clinic -   New Patient  Visit   Date of Service : 10/25/2024    Name: Xenia Haywood  Patient ID: [id number]  Age: 79 y.o. Sex: female YOB: 1945    Author: CESAR AGUERO MD    PCP: Mari Blair MD    Referring Provider:Mari Blair MD    Reason for Visit / CC:    Chief Complaint   Patient presents with    New Patient         ASSESSMENT  AND PLAN           Assessment & Plan  1.  Apple watch suggestive of narrow complex irregular tachycardia Atrial Fibrillation versus sinus tachycardia with frequent PACs   She reports feeling \"off\" but denies palpitations, dizziness, lightheadedness, or syncope. TSH is normal. Due to her advanced age and athletic lifestyle, she is at risk for atrial fibrillation. A 14-day Holter monitor will be used to assess the frequency and duration of atrial fibrillation versus other cardiac arrhythmias. An echocardiogram will be performed to evaluate left ventricular systolic and diastolic function and rule out any valvular heart disease. She was educated on the importance of staying hydrated and maintaining a healthy lifestyle, and was informed about triggers and risk factors for atrial fibrillation, including hypertension, alcohol, sleep apnea, stress, and COVID-19. She was instructed to monitor symptoms and perform an EKG if feeling unwell. The need for anticoagulation will be reevaluated based on monitor results. Further treatment options, including ablation, were discussed for future consideration if the diagnosis is confirmed.    2. Chronic Sinus Bradycardia.  No specific treatment is needed at this time as her bradycardia is likely secondary to her athletic lifestyle. Heart rate will be monitored during the 14-day Holter monitor.    3. Hypothyroidism.  Fairly well controlled on levothyroxine. She was advised to follow up with her primary care physician for further management.    4. Osteoporosis.  Currently on alendronate. She

## 2024-12-20 ENCOUNTER — OFFICE VISIT (OUTPATIENT)
Age: 79
End: 2024-12-20
Payer: MEDICARE

## 2024-12-20 VITALS
BODY MASS INDEX: 26.2 KG/M2 | HEIGHT: 62 IN | DIASTOLIC BLOOD PRESSURE: 64 MMHG | HEART RATE: 61 BPM | TEMPERATURE: 97.7 F | SYSTOLIC BLOOD PRESSURE: 130 MMHG | OXYGEN SATURATION: 98 % | WEIGHT: 142.4 LBS

## 2024-12-20 DIAGNOSIS — E06.3 HYPOTHYROIDISM DUE TO HASHIMOTO'S THYROIDITIS: ICD-10-CM

## 2024-12-20 DIAGNOSIS — M81.0 AGE-RELATED OSTEOPOROSIS WITHOUT CURRENT PATHOLOGICAL FRACTURE: ICD-10-CM

## 2024-12-20 DIAGNOSIS — M81.0 AGE-RELATED OSTEOPOROSIS WITHOUT CURRENT PATHOLOGICAL FRACTURE: Primary | ICD-10-CM

## 2024-12-20 LAB
25(OH)D3 SERPL-MCNC: 34 NG/ML (ref 30–100)
ANION GAP SERPL CALC-SCNC: 4 MMOL/L (ref 2–12)
BUN SERPL-MCNC: 13 MG/DL (ref 6–20)
BUN/CREAT SERPL: 21 (ref 12–20)
CALCIUM SERPL-MCNC: 9.7 MG/DL (ref 8.5–10.1)
CHLORIDE SERPL-SCNC: 104 MMOL/L (ref 97–108)
CO2 SERPL-SCNC: 30 MMOL/L (ref 21–32)
CREAT SERPL-MCNC: 0.61 MG/DL (ref 0.55–1.02)
GLUCOSE SERPL-MCNC: 77 MG/DL (ref 65–100)
POTASSIUM SERPL-SCNC: 5 MMOL/L (ref 3.5–5.1)
SODIUM SERPL-SCNC: 138 MMOL/L (ref 136–145)
TSH SERPL DL<=0.05 MIU/L-ACNC: 7.18 UIU/ML (ref 0.36–3.74)

## 2024-12-20 PROCEDURE — G8428 CUR MEDS NOT DOCUMENT: HCPCS | Performed by: INTERNAL MEDICINE

## 2024-12-20 PROCEDURE — G8399 PT W/DXA RESULTS DOCUMENT: HCPCS | Performed by: INTERNAL MEDICINE

## 2024-12-20 PROCEDURE — G8419 CALC BMI OUT NRM PARAM NOF/U: HCPCS | Performed by: INTERNAL MEDICINE

## 2024-12-20 PROCEDURE — 1126F AMNT PAIN NOTED NONE PRSNT: CPT | Performed by: INTERNAL MEDICINE

## 2024-12-20 PROCEDURE — 1123F ACP DISCUSS/DSCN MKR DOCD: CPT | Performed by: INTERNAL MEDICINE

## 2024-12-20 PROCEDURE — 99214 OFFICE O/P EST MOD 30 MIN: CPT | Performed by: INTERNAL MEDICINE

## 2024-12-20 PROCEDURE — 1036F TOBACCO NON-USER: CPT | Performed by: INTERNAL MEDICINE

## 2024-12-20 PROCEDURE — 1090F PRES/ABSN URINE INCON ASSESS: CPT | Performed by: INTERNAL MEDICINE

## 2024-12-20 PROCEDURE — G8484 FLU IMMUNIZE NO ADMIN: HCPCS | Performed by: INTERNAL MEDICINE

## 2024-12-20 NOTE — PROGRESS NOTES
Xenia Haywood is a 79 y.o. female here for   Chief Complaint   Patient presents with    Osteoporosis       1. Have you been to the ER, urgent care clinic since your last visit?  Hospitalized since your last visit? -Pt stated she went to the urgent care r/t fever from the Covid shot    2. Have you seen or consulted any other health care providers outside of the [facility] since your last visit?  Include any pap smears or colon screening.-NO
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
## 2024-12-24 SDOH — ECONOMIC STABILITY: FOOD INSECURITY: WITHIN THE PAST 12 MONTHS, THE FOOD YOU BOUGHT JUST DIDN'T LAST AND YOU DIDN'T HAVE MONEY TO GET MORE.: NEVER TRUE

## 2024-12-24 SDOH — HEALTH STABILITY: PHYSICAL HEALTH: ON AVERAGE, HOW MANY MINUTES DO YOU ENGAGE IN EXERCISE AT THIS LEVEL?: 60 MIN

## 2024-12-24 SDOH — ECONOMIC STABILITY: FOOD INSECURITY: WITHIN THE PAST 12 MONTHS, YOU WORRIED THAT YOUR FOOD WOULD RUN OUT BEFORE YOU GOT MONEY TO BUY MORE.: NEVER TRUE

## 2024-12-24 SDOH — HEALTH STABILITY: PHYSICAL HEALTH: ON AVERAGE, HOW MANY DAYS PER WEEK DO YOU ENGAGE IN MODERATE TO STRENUOUS EXERCISE (LIKE A BRISK WALK)?: 4 DAYS

## 2024-12-24 SDOH — ECONOMIC STABILITY: INCOME INSECURITY: HOW HARD IS IT FOR YOU TO PAY FOR THE VERY BASICS LIKE FOOD, HOUSING, MEDICAL CARE, AND HEATING?: NOT HARD AT ALL

## 2024-12-24 SDOH — ECONOMIC STABILITY: TRANSPORTATION INSECURITY
IN THE PAST 12 MONTHS, HAS LACK OF TRANSPORTATION KEPT YOU FROM MEETINGS, WORK, OR FROM GETTING THINGS NEEDED FOR DAILY LIVING?: NO

## 2024-12-24 ASSESSMENT — LIFESTYLE VARIABLES
HOW OFTEN DO YOU HAVE A DRINK CONTAINING ALCOHOL: 2-3 TIMES A WEEK
HOW MANY STANDARD DRINKS CONTAINING ALCOHOL DO YOU HAVE ON A TYPICAL DAY: 1 OR 2
HOW OFTEN DO YOU HAVE A DRINK CONTAINING ALCOHOL: 4
HOW OFTEN DO YOU HAVE SIX OR MORE DRINKS ON ONE OCCASION: 1
HOW MANY STANDARD DRINKS CONTAINING ALCOHOL DO YOU HAVE ON A TYPICAL DAY: 1

## 2024-12-24 ASSESSMENT — PATIENT HEALTH QUESTIONNAIRE - PHQ9
SUM OF ALL RESPONSES TO PHQ QUESTIONS 1-9: 0
1. LITTLE INTEREST OR PLEASURE IN DOING THINGS: NOT AT ALL
2. FEELING DOWN, DEPRESSED OR HOPELESS: NOT AT ALL
SUM OF ALL RESPONSES TO PHQ9 QUESTIONS 1 & 2: 0
SUM OF ALL RESPONSES TO PHQ QUESTIONS 1-9: 0

## 2024-12-26 ENCOUNTER — PATIENT MESSAGE (OUTPATIENT)
Age: 79
End: 2024-12-26

## 2024-12-26 ENCOUNTER — OFFICE VISIT (OUTPATIENT)
Facility: CLINIC | Age: 79
End: 2024-12-26
Payer: MEDICARE

## 2024-12-26 ENCOUNTER — TELEPHONE (OUTPATIENT)
Age: 79
End: 2024-12-26

## 2024-12-26 VITALS
HEART RATE: 67 BPM | SYSTOLIC BLOOD PRESSURE: 134 MMHG | OXYGEN SATURATION: 97 % | RESPIRATION RATE: 14 BRPM | HEIGHT: 62 IN | DIASTOLIC BLOOD PRESSURE: 80 MMHG | WEIGHT: 141 LBS | BODY MASS INDEX: 25.95 KG/M2

## 2024-12-26 DIAGNOSIS — M81.0 AGE-RELATED OSTEOPOROSIS WITHOUT CURRENT PATHOLOGICAL FRACTURE: Primary | ICD-10-CM

## 2024-12-26 DIAGNOSIS — M17.11 PRIMARY OSTEOARTHRITIS OF RIGHT KNEE: ICD-10-CM

## 2024-12-26 DIAGNOSIS — E06.3 HYPOTHYROIDISM DUE TO HASHIMOTO'S THYROIDITIS: ICD-10-CM

## 2024-12-26 DIAGNOSIS — Z00.00 MEDICARE ANNUAL WELLNESS VISIT, SUBSEQUENT: Primary | ICD-10-CM

## 2024-12-26 DIAGNOSIS — Z86.79 HX OF SUPRAVENTRICULAR TACHYCARDIA: ICD-10-CM

## 2024-12-26 DIAGNOSIS — M81.0 AGE-RELATED OSTEOPOROSIS WITHOUT CURRENT PATHOLOGICAL FRACTURE: ICD-10-CM

## 2024-12-26 DIAGNOSIS — E07.9 DISORDER OF THYROID, UNSPECIFIED: ICD-10-CM

## 2024-12-26 PROCEDURE — 99213 OFFICE O/P EST LOW 20 MIN: CPT | Performed by: INTERNAL MEDICINE

## 2024-12-26 NOTE — PROGRESS NOTES
Medicare Annual Wellness Visit  ASSESSMENT & PLAN:  1. Medicare annual wellness visit, subsequent  Patient appears in overall very good health.-     PHIL DIGITAL SCREEN W OR WO CAD BILATERAL; Future  Her mammogram is up-to-date and her next mammogram will be in July 2025.  She had her colonoscopy with Dr. June and based on her history and findings she was told that she does not need future colonoscopies     Her LDL cholesterol levels have been consistently within the normal range since 2019.    Her last fall was in 2023 over a year ago     She undergoes annual dermatological evaluations and recently had an eye examination with Dr. Hussein Duran, who reported no issues.    She takes Caltrate, 2 tablets daily, and vitamin D 5000 international units three times a week. She does not smoke and consumes wine irregularly. She walks 3 miles daily for exercise.  2. Primary osteoarthritis of right knee  She would like to follow-up with her orthopedist Dr. Aviles.  She is not having any falls however is having difficulty descending stairs and risk for falls.-     External Referral To Orthopedic Surgery  3. Age-related osteoporosis without current pathological fracture  Working with Dr. Judy Ruiz.  She is currently on Fosamax 70 mg weekly.  She has a bone density in July 2025.  At that point we will evaluate for change and if stable and no significant change or decrease will be able to take a holiday.  Endocrines notes were reviewed  A bone density test is scheduled for July 2025, after which a decision regarding a medication holiday will be made.  4. Disorder of thyroid, unspecified  She appears to be hypothyroid.  She is in communication with Dr. Ruiz.  She is not having any cold intolerance or fatigue    5. Hx of supraventricular tachycardia  Results of echo and monitor reviewed with patient.  Her echocardiogram results are within normal limits, indicating a normal ejection fraction. The mild annular calcification observed

## 2024-12-26 NOTE — TELEPHONE ENCOUNTER
Telephone call made to patient. Went straight to voicemail. Full name on answering machine. Left results via voicemail.     Sent a [employer] message to patient with results.

## 2024-12-26 NOTE — TELEPHONE ENCOUNTER
----- Message from Dr. Yovani Urban MD sent at 12/25/2024  7:27 PM EST -----  Echo shows normal heart function-mild thickening of heart walls due to athletic heart nothing to worry and mild leaky mitral valve continue same treatment

## 2025-02-20 DIAGNOSIS — M81.0 AGE-RELATED OSTEOPOROSIS WITHOUT CURRENT PATHOLOGICAL FRACTURE: Primary | ICD-10-CM

## 2025-02-20 RX ORDER — ALENDRONATE SODIUM 70 MG/1
TABLET ORAL
Qty: 12 TABLET | Refills: 0 | Status: SHIPPED | OUTPATIENT
Start: 2025-02-20

## 2025-03-27 DIAGNOSIS — E06.3 HYPOTHYROIDISM DUE TO HASHIMOTO'S THYROIDITIS: ICD-10-CM

## 2025-03-27 LAB — TSH SERPL DL<=0.05 MIU/L-ACNC: 2.62 UIU/ML (ref 0.36–3.74)

## 2025-03-31 ENCOUNTER — RESULTS FOLLOW-UP (OUTPATIENT)
Age: 80
End: 2025-03-31

## 2025-05-15 DIAGNOSIS — M81.0 AGE-RELATED OSTEOPOROSIS WITHOUT CURRENT PATHOLOGICAL FRACTURE: ICD-10-CM

## 2025-05-18 RX ORDER — ALENDRONATE SODIUM 70 MG/1
70 TABLET ORAL
Qty: 12 TABLET | Refills: 0 | Status: SHIPPED | OUTPATIENT
Start: 2025-05-18

## 2025-06-07 DIAGNOSIS — E06.3 HYPOTHYROIDISM DUE TO HASHIMOTO'S THYROIDITIS: ICD-10-CM

## 2025-06-09 RX ORDER — LEVOTHYROXINE SODIUM 88 UG/1
88 TABLET ORAL DAILY
Qty: 90 TABLET | Refills: 3 | Status: SHIPPED | OUTPATIENT
Start: 2025-06-09

## 2025-07-02 ENCOUNTER — HOSPITAL ENCOUNTER (OUTPATIENT)
Facility: HOSPITAL | Age: 80
Discharge: HOME | End: 2025-07-05
Attending: INTERNAL MEDICINE
Payer: MEDICARE

## 2025-07-02 DIAGNOSIS — M81.0 AGE-RELATED OSTEOPOROSIS WITHOUT CURRENT PATHOLOGICAL FRACTURE: ICD-10-CM

## 2025-07-02 PROCEDURE — 77080 DXA BONE DENSITY AXIAL: CPT

## 2025-07-31 ENCOUNTER — HOSPITAL ENCOUNTER (OUTPATIENT)
Facility: HOSPITAL | Age: 80
Discharge: HOME | End: 2025-07-31
Payer: MEDICARE

## 2025-07-31 ENCOUNTER — TRANSCRIBE ORDERS (OUTPATIENT)
Facility: HOSPITAL | Age: 80
End: 2025-07-31

## 2025-07-31 VITALS — HEIGHT: 62 IN | BODY MASS INDEX: 24.84 KG/M2 | WEIGHT: 135 LBS

## 2025-07-31 DIAGNOSIS — Z12.31 OTHER SCREENING MAMMOGRAM: ICD-10-CM

## 2025-07-31 DIAGNOSIS — Z12.31 OTHER SCREENING MAMMOGRAM: Primary | ICD-10-CM

## 2025-07-31 PROCEDURE — 77063 BREAST TOMOSYNTHESIS BI: CPT

## 2025-08-27 ENCOUNTER — OFFICE VISIT (OUTPATIENT)
Age: 80
End: 2025-08-27
Payer: MEDICARE

## 2025-08-27 VITALS
SYSTOLIC BLOOD PRESSURE: 134 MMHG | HEART RATE: 60 BPM | WEIGHT: 138.4 LBS | DIASTOLIC BLOOD PRESSURE: 69 MMHG | BODY MASS INDEX: 25.47 KG/M2 | TEMPERATURE: 98.2 F | HEIGHT: 62 IN | OXYGEN SATURATION: 98 %

## 2025-08-27 DIAGNOSIS — E06.3 HYPOTHYROIDISM DUE TO HASHIMOTO'S THYROIDITIS: ICD-10-CM

## 2025-08-27 DIAGNOSIS — M81.0 AGE-RELATED OSTEOPOROSIS WITHOUT CURRENT PATHOLOGICAL FRACTURE: Primary | ICD-10-CM

## 2025-08-27 PROCEDURE — G8419 CALC BMI OUT NRM PARAM NOF/U: HCPCS | Performed by: INTERNAL MEDICINE

## 2025-08-27 PROCEDURE — 99214 OFFICE O/P EST MOD 30 MIN: CPT | Performed by: INTERNAL MEDICINE

## 2025-08-27 PROCEDURE — 1090F PRES/ABSN URINE INCON ASSESS: CPT | Performed by: INTERNAL MEDICINE

## 2025-08-27 PROCEDURE — G8399 PT W/DXA RESULTS DOCUMENT: HCPCS | Performed by: INTERNAL MEDICINE

## 2025-08-27 PROCEDURE — 1036F TOBACCO NON-USER: CPT | Performed by: INTERNAL MEDICINE

## 2025-08-27 PROCEDURE — 1126F AMNT PAIN NOTED NONE PRSNT: CPT | Performed by: INTERNAL MEDICINE

## 2025-08-27 PROCEDURE — 1123F ACP DISCUSS/DSCN MKR DOCD: CPT | Performed by: INTERNAL MEDICINE

## 2025-08-27 PROCEDURE — G8427 DOCREV CUR MEDS BY ELIG CLIN: HCPCS | Performed by: INTERNAL MEDICINE

## (undated) DEVICE — TIP COVER ACCESSORY

## (undated) DEVICE — MASTISOL ADHESIVE LIQ 2/3ML

## (undated) DEVICE — SYR 10ML CTRL LR LCK NSAF LF --

## (undated) DEVICE — SUTURE VCRL SZ 2-0 L27IN ABSRB UD L26MM SH 1/2 CIR J417H

## (undated) DEVICE — Z DISCONTINUED NO SUB IDED TROCAR LAP DIA8MM STD LEN BLDELSS TAPR TIP THRD N OPT VW

## (undated) DEVICE — TUBING INSUF HI FLO HEAT STRL --

## (undated) DEVICE — SURGICAL PROCEDURE PACK BASIN MAJ SET CUST NO CAUT

## (undated) DEVICE — Device

## (undated) DEVICE — SOLUTION IV 1000ML 0.9% SOD CHL

## (undated) DEVICE — PAD SANIT NPKN 4IN GRD

## (undated) DEVICE — 3M™ DURAPORE™ SURGICAL TAPE 1538-3, 3 INCH X 10 YARD (7,5CM X 9,1M), 4 ROLLS/BOX: Brand: 3M™ DURAPORE™

## (undated) DEVICE — (D)PREP SKN CHLRAPRP APPL 26ML -- CONVERT TO ITEM 371833

## (undated) DEVICE — CURETTE SUCT ENDOMET SELF CONTAINED FLX PIPELLE

## (undated) DEVICE — BLADELESS OPTICAL TROCAR WITH FIXATION CANNULA: Brand: VERSAONE

## (undated) DEVICE — 3000CC GUARDIAN II: Brand: GUARDIAN

## (undated) DEVICE — TROCAR: Brand: KII FIOS FIRST ENTRY

## (undated) DEVICE — SOLUTION IRRIGATION H2O 0797305] ICU MEDICAL INC]

## (undated) DEVICE — 15MM BATTERY POWERED MORCELLATOR SYSTEM WITH OBTURATOR: Brand: LINA XCISE™, LAPAROSCOPIC MORCELLATOR

## (undated) DEVICE — INSUFFLATION NEEDLE: Brand: SURGINEEDLE

## (undated) DEVICE — DEVON™ KNEE AND BODY STRAP 60" X 3" (1.5 M X 7.6 CM): Brand: DEVON

## (undated) DEVICE — SOLUTION IRRIG 3000ML 0.9% SOD CHL FLX CONT 0797208] ICU MEDICAL INC]

## (undated) DEVICE — NEEDLE HYPO 22GA L1.5IN BLK S STL HUB POLYPR SHLD REG BVL

## (undated) DEVICE — Z INACTIVE USE 2527070 DRAPE SURG W40XL44IN UNDERBUTTOCK SMS POLYPR W/ PCH BK DISP

## (undated) DEVICE — SUTURE MCRYL SZ 4-0 L27IN ABSRB UD L19MM PS-2 1/2 CIR PRIM Y426H

## (undated) DEVICE — OBTRTR BLDELSS 8MM DISP -- DA VINCI - SNGL USE

## (undated) DEVICE — SNARE VASC L240CM LOOP W10MM SHTH DIA2.4MM RND STIFF CLD

## (undated) DEVICE — DEVICE TRNSF SPIK STL 2008S] MICROTEK MEDICAL INC]

## (undated) DEVICE — STERILE POLYISOPRENE POWDER-FREE SURGICAL GLOVES: Brand: PROTEXIS

## (undated) DEVICE — VISUALIZATION SYSTEM: Brand: CLEARIFY

## (undated) DEVICE — BLADE ASSEMB CLP HAIR FINE --

## (undated) DEVICE — KIT DISPOSABLE ACC 4ARM ENDOWRIST DAVINCI

## (undated) DEVICE — GOWN,SIRUS,FABRNF,XL,20/CS: Brand: MEDLINE

## (undated) DEVICE — TRAY CATH OD16FR SIL URIN M STATLOK STBL DEV SURSTP

## (undated) DEVICE — TRAP SURG QUAD PARABOLA SLOT DSGN SFTY SCRN TRAPEASE

## (undated) DEVICE — DRAPE,REIN 53X77,STERILE: Brand: MEDLINE

## (undated) DEVICE — SUTURE SZ 0 27IN 5/8 CIR UR-6  TAPER PT VIOLET ABSRB VICRYL J603H

## (undated) DEVICE — TUBING FLTR PLUME AWAY EVAC W/ SUCT DEV DISP PUREVIEW

## (undated) DEVICE — COVER LT HNDL BLU PLAS

## (undated) DEVICE — INFECTION CONTROL KIT SYS

## (undated) DEVICE — (D)STRIP SKN CLSR 0.5X4IN WHT --

## (undated) DEVICE — ELECTRO LUBE IS A SINGLE PATIENT USE DEVICE THAT IS INTENDED TO BE USED ON ELECTROSURGICAL ELECTRODES TO REDUCE STICKING.: Brand: KEY SURGICAL ELECTRO LUBE

## (undated) DEVICE — 3M™ IOBAN™ 2 ANTIMICROBIAL INCISE DRAPE 6650EZ: Brand: IOBAN™ 2

## (undated) DEVICE — PERI/GYN PACK: Brand: CONVERTORS

## (undated) DEVICE — COVER,TABLE,60X90,STERILE: Brand: MEDLINE

## (undated) DEVICE — TRAY PREP DRY W/ PREM GLV 2 APPL 6 SPNG 2 UNDPD 1 OVERWRAP

## (undated) DEVICE — CYSTO/BLADDER IRRIGATION SET, REGULATING CLAMP

## (undated) DEVICE — SOL INJ SOD CL0.9% 10ML PREFIL --

## (undated) DEVICE — REM POLYHESIVE ADULT PATIENT RETURN ELECTRODE: Brand: VALLEYLAB

## (undated) DEVICE — SURGICAL PROCEDURE PACK GYN LAPAROSCOPY CUST SMH LF